# Patient Record
Sex: MALE | Race: WHITE | NOT HISPANIC OR LATINO | Employment: OTHER
[De-identification: names, ages, dates, MRNs, and addresses within clinical notes are randomized per-mention and may not be internally consistent; named-entity substitution may affect disease eponyms.]

---

## 2017-10-30 ENCOUNTER — RECORDS - HEALTHEAST (OUTPATIENT)
Dept: ADMINISTRATIVE | Facility: OTHER | Age: 77
End: 2017-10-30

## 2017-10-30 LAB — HBA1C MFR BLD: 6.7 % (ref 4–6)

## 2018-05-07 ENCOUNTER — RECORDS - HEALTHEAST (OUTPATIENT)
Dept: ADMINISTRATIVE | Facility: OTHER | Age: 78
End: 2018-05-07

## 2018-05-07 LAB — HBA1C MFR BLD: 6.7 % (ref 4–6)

## 2018-08-01 ENCOUNTER — RECORDS - HEALTHEAST (OUTPATIENT)
Dept: ADMINISTRATIVE | Facility: OTHER | Age: 78
End: 2018-08-01

## 2018-09-04 ENCOUNTER — RECORDS - HEALTHEAST (OUTPATIENT)
Dept: ADMINISTRATIVE | Facility: OTHER | Age: 78
End: 2018-09-04

## 2018-10-29 ENCOUNTER — RECORDS - HEALTHEAST (OUTPATIENT)
Dept: ADMINISTRATIVE | Facility: OTHER | Age: 78
End: 2018-10-29

## 2018-10-29 LAB — HBA1C MFR BLD: 6.9 % (ref 4–5.6)

## 2019-02-11 ENCOUNTER — NEW PATIENT (OUTPATIENT)
Dept: URBAN - METROPOLITAN AREA CLINIC 51 | Facility: CLINIC | Age: 79
End: 2019-02-11
Payer: MEDICARE

## 2019-02-11 DIAGNOSIS — Z79.84 LONG TERM (CURRENT) USE OF ORAL ANTIDIABETIC DRUGS: ICD-10-CM

## 2019-02-11 DIAGNOSIS — H35.071 RETINAL TELANGIECTASIS, RIGHT EYE: ICD-10-CM

## 2019-02-11 DIAGNOSIS — E11.3293 DIABETES MELLITUS TYPE 2 WITH MILD NON-PROLIFERATI: ICD-10-CM

## 2019-02-11 DIAGNOSIS — H34.8310 BRANCH RETINAL VEIN OCCLUSION W/ MACULAR EDEMA, RIGHT EYE: Primary | ICD-10-CM

## 2019-02-11 PROCEDURE — 92235 FLUORESCEIN ANGRPH MLTIFRAME: CPT | Performed by: OPHTHALMOLOGY

## 2019-02-11 PROCEDURE — 92134 CPTRZ OPH DX IMG PST SGM RTA: CPT | Performed by: OPHTHALMOLOGY

## 2019-02-11 PROCEDURE — 92004 COMPRE OPH EXAM NEW PT 1/>: CPT | Performed by: OPHTHALMOLOGY

## 2019-02-11 ASSESSMENT — INTRAOCULAR PRESSURE
OS: 14
OD: 17

## 2019-05-15 ENCOUNTER — RECORDS - HEALTHEAST (OUTPATIENT)
Dept: HEALTH INFORMATION MANAGEMENT | Facility: CLINIC | Age: 79
End: 2019-05-15

## 2019-07-29 ENCOUNTER — RECORDS - HEALTHEAST (OUTPATIENT)
Dept: ADMINISTRATIVE | Facility: OTHER | Age: 79
End: 2019-07-29

## 2019-07-31 ENCOUNTER — RECORDS - HEALTHEAST (OUTPATIENT)
Dept: HEALTH INFORMATION MANAGEMENT | Facility: CLINIC | Age: 79
End: 2019-07-31

## 2019-09-04 ENCOUNTER — COMMUNICATION - HEALTHEAST (OUTPATIENT)
Dept: TELEHEALTH | Facility: CLINIC | Age: 79
End: 2019-09-04

## 2019-09-04 ENCOUNTER — OFFICE VISIT - HEALTHEAST (OUTPATIENT)
Dept: INTERNAL MEDICINE | Facility: CLINIC | Age: 79
End: 2019-09-04

## 2019-09-04 DIAGNOSIS — Z51.81 ENCOUNTER FOR THERAPEUTIC DRUG MONITORING: ICD-10-CM

## 2019-09-04 DIAGNOSIS — Z87.39 HISTORY OF GOUT: ICD-10-CM

## 2019-09-04 DIAGNOSIS — N18.30 CHRONIC RENAL INSUFFICIENCY, STAGE 3 (MODERATE) (H): ICD-10-CM

## 2019-09-04 DIAGNOSIS — E11.9 TYPE 2 DIABETES MELLITUS WITHOUT COMPLICATION, WITHOUT LONG-TERM CURRENT USE OF INSULIN (H): ICD-10-CM

## 2019-09-04 DIAGNOSIS — Z86.73 HISTORY OF STROKE: ICD-10-CM

## 2019-09-04 DIAGNOSIS — I25.84 CORONARY ARTERY DISEASE DUE TO CALCIFIED CORONARY LESION: ICD-10-CM

## 2019-09-04 DIAGNOSIS — E78.00 HYPERCHOLESTEROLEMIA: ICD-10-CM

## 2019-09-04 DIAGNOSIS — I10 ESSENTIAL HYPERTENSION, BENIGN: ICD-10-CM

## 2019-09-04 DIAGNOSIS — I25.10 CORONARY ARTERY DISEASE DUE TO CALCIFIED CORONARY LESION: ICD-10-CM

## 2019-09-04 LAB
ALBUMIN SERPL-MCNC: 4.1 G/DL (ref 3.5–5)
ALP SERPL-CCNC: 72 U/L (ref 45–120)
ALT SERPL W P-5'-P-CCNC: 27 U/L (ref 0–45)
ANION GAP SERPL CALCULATED.3IONS-SCNC: 10 MMOL/L (ref 5–18)
AST SERPL W P-5'-P-CCNC: 22 U/L (ref 0–40)
BILIRUB SERPL-MCNC: 0.5 MG/DL (ref 0–1)
BUN SERPL-MCNC: 29 MG/DL (ref 8–28)
CALCIUM SERPL-MCNC: 9.6 MG/DL (ref 8.5–10.5)
CHLORIDE BLD-SCNC: 107 MMOL/L (ref 98–107)
CO2 SERPL-SCNC: 26 MMOL/L (ref 22–31)
CREAT SERPL-MCNC: 2 MG/DL (ref 0.7–1.3)
GFR SERPL CREATININE-BSD FRML MDRD: 32 ML/MIN/1.73M2
GLUCOSE BLD-MCNC: 155 MG/DL (ref 70–125)
HBA1C MFR BLD: 7.2 % (ref 3.5–6)
LDLC SERPL CALC-MCNC: 104 MG/DL
POTASSIUM BLD-SCNC: 4.9 MMOL/L (ref 3.5–5)
PROT SERPL-MCNC: 7 G/DL (ref 6–8)
SODIUM SERPL-SCNC: 143 MMOL/L (ref 136–145)
URATE SERPL-MCNC: 6.3 MG/DL (ref 3–8)

## 2019-09-04 RX ORDER — ASPIRIN 325 MG
325 TABLET ORAL DAILY
Status: SHIPPED | COMMUNITY
Start: 2014-01-26

## 2019-09-04 RX ORDER — GLUCOSAMINE HCL/CHONDROITIN SU 500-400 MG
CAPSULE ORAL
Status: SHIPPED | COMMUNITY
Start: 2019-07-17 | End: 2022-07-11

## 2019-09-05 ENCOUNTER — COMMUNICATION - HEALTHEAST (OUTPATIENT)
Dept: INTERNAL MEDICINE | Facility: CLINIC | Age: 79
End: 2019-09-05

## 2019-09-13 ENCOUNTER — OFFICE VISIT - HEALTHEAST (OUTPATIENT)
Dept: CARDIOLOGY | Facility: CLINIC | Age: 79
End: 2019-09-13

## 2019-09-13 DIAGNOSIS — I25.10 CORONARY ARTERY DISEASE DUE TO CALCIFIED CORONARY LESION: ICD-10-CM

## 2019-09-13 DIAGNOSIS — I10 ESSENTIAL HYPERTENSION: ICD-10-CM

## 2019-09-13 DIAGNOSIS — E78.2 MIXED HYPERLIPIDEMIA: ICD-10-CM

## 2019-09-13 DIAGNOSIS — I25.84 CORONARY ARTERY DISEASE DUE TO CALCIFIED CORONARY LESION: ICD-10-CM

## 2019-09-13 DIAGNOSIS — I20.9 ANGINA PECTORIS (H): ICD-10-CM

## 2019-09-13 ASSESSMENT — MIFFLIN-ST. JEOR: SCORE: 1857.05

## 2019-09-16 ENCOUNTER — HOSPITAL ENCOUNTER (OUTPATIENT)
Dept: NUCLEAR MEDICINE | Facility: CLINIC | Age: 79
Discharge: HOME | End: 2019-09-16
Attending: INTERNAL MEDICINE

## 2019-09-16 ENCOUNTER — HOSPITAL ENCOUNTER (OUTPATIENT)
Dept: CARDIOLOGY | Facility: CLINIC | Age: 79
Discharge: HOME | End: 2019-09-16
Attending: INTERNAL MEDICINE

## 2019-09-16 DIAGNOSIS — I20.9 ANGINA PECTORIS (H): ICD-10-CM

## 2019-09-16 LAB
CV STRESS CURRENT BP HE: NORMAL
CV STRESS CURRENT HR HE: 63
CV STRESS CURRENT HR HE: 67
CV STRESS CURRENT HR HE: 69
CV STRESS CURRENT HR HE: 71
CV STRESS CURRENT HR HE: 72
CV STRESS CURRENT HR HE: 73
CV STRESS CURRENT HR HE: 74
CV STRESS CURRENT HR HE: 75
CV STRESS DEVIATION TIME HE: NORMAL
CV STRESS ECHO PERCENT HR HE: NORMAL
CV STRESS EXERCISE STAGE HE: NORMAL
CV STRESS FINAL RESTING BP HE: NORMAL
CV STRESS FINAL RESTING HR HE: 74
CV STRESS MAX HR HE: 76
CV STRESS MAX TREADMILL GRADE HE: 0
CV STRESS MAX TREADMILL SPEED HE: 0
CV STRESS PEAK DIA BP HE: NORMAL
CV STRESS PEAK SYS BP HE: NORMAL
CV STRESS PHASE HE: NORMAL
CV STRESS PROTOCOL HE: NORMAL
CV STRESS RESTING PT POSITION HE: NORMAL
CV STRESS ST DEVIATION AMOUNT HE: NORMAL
CV STRESS ST DEVIATION ELEVATION HE: NORMAL
CV STRESS ST EVELATION AMOUNT HE: NORMAL
CV STRESS TEST TYPE HE: NORMAL
CV STRESS TOTAL STAGE TIME MIN 1 HE: NORMAL
NUC STRESS EJECTION FRACTION: 67 %
STRESS ECHO BASELINE BP: NORMAL
STRESS ECHO BASELINE HR: 70
STRESS ECHO CALCULATED PERCENT HR: 54 %
STRESS ECHO LAST STRESS BP: NORMAL
STRESS ECHO LAST STRESS HR: 71

## 2019-12-21 ENCOUNTER — COMMUNICATION - HEALTHEAST (OUTPATIENT)
Dept: SCHEDULING | Facility: CLINIC | Age: 79
End: 2019-12-21

## 2019-12-21 ENCOUNTER — COMMUNICATION - HEALTHEAST (OUTPATIENT)
Dept: INTERNAL MEDICINE | Facility: CLINIC | Age: 79
End: 2019-12-21

## 2019-12-21 DIAGNOSIS — I10 ESSENTIAL HYPERTENSION, BENIGN: ICD-10-CM

## 2019-12-22 RX ORDER — CARVEDILOL 3.12 MG/1
3.12 TABLET ORAL 2 TIMES DAILY WITH MEALS
Qty: 180 TABLET | Refills: 2 | Status: SHIPPED | OUTPATIENT
Start: 2019-12-22 | End: 2022-06-08

## 2020-01-06 ENCOUNTER — COMMUNICATION - HEALTHEAST (OUTPATIENT)
Dept: INTERNAL MEDICINE | Facility: CLINIC | Age: 80
End: 2020-01-06

## 2020-01-06 DIAGNOSIS — E78.00 HYPERCHOLESTEROLEMIA: ICD-10-CM

## 2020-02-01 ENCOUNTER — COMMUNICATION - HEALTHEAST (OUTPATIENT)
Dept: INTERNAL MEDICINE | Facility: CLINIC | Age: 80
End: 2020-02-01

## 2020-02-01 DIAGNOSIS — I10 ESSENTIAL HYPERTENSION, BENIGN: ICD-10-CM

## 2020-05-01 ENCOUNTER — COMMUNICATION - HEALTHEAST (OUTPATIENT)
Dept: INTERNAL MEDICINE | Facility: CLINIC | Age: 80
End: 2020-05-01

## 2020-05-06 ENCOUNTER — OFFICE VISIT - HEALTHEAST (OUTPATIENT)
Dept: INTERNAL MEDICINE | Facility: CLINIC | Age: 80
End: 2020-05-06

## 2020-05-06 DIAGNOSIS — H35.00 RETINOPATHY: ICD-10-CM

## 2020-05-06 DIAGNOSIS — E78.00 HYPERCHOLESTEROLEMIA: ICD-10-CM

## 2020-05-06 DIAGNOSIS — Z86.73 HISTORY OF STROKE: ICD-10-CM

## 2020-05-06 DIAGNOSIS — Z51.81 ENCOUNTER FOR THERAPEUTIC DRUG MONITORING: ICD-10-CM

## 2020-05-06 DIAGNOSIS — J30.0 VASOMOTOR RHINITIS: ICD-10-CM

## 2020-05-06 DIAGNOSIS — E11.9 TYPE 2 DIABETES MELLITUS WITHOUT COMPLICATION, WITHOUT LONG-TERM CURRENT USE OF INSULIN (H): ICD-10-CM

## 2020-05-06 DIAGNOSIS — Z87.39 HISTORY OF GOUT: ICD-10-CM

## 2020-05-06 DIAGNOSIS — N18.30 CHRONIC RENAL INSUFFICIENCY, STAGE 3 (MODERATE) (H): ICD-10-CM

## 2020-05-06 DIAGNOSIS — I25.84 CORONARY ARTERY DISEASE DUE TO CALCIFIED CORONARY LESION: ICD-10-CM

## 2020-05-06 DIAGNOSIS — I10 ESSENTIAL HYPERTENSION, BENIGN: ICD-10-CM

## 2020-05-06 DIAGNOSIS — I25.10 CORONARY ARTERY DISEASE DUE TO CALCIFIED CORONARY LESION: ICD-10-CM

## 2020-05-06 RX ORDER — AZELASTINE 1 MG/ML
SPRAY, METERED NASAL
Qty: 30 ML | Refills: 12 | Status: SHIPPED | OUTPATIENT
Start: 2020-05-06 | End: 2022-07-27

## 2020-05-22 ENCOUNTER — RECORDS - HEALTHEAST (OUTPATIENT)
Dept: ADMINISTRATIVE | Facility: OTHER | Age: 80
End: 2020-05-22

## 2020-05-22 LAB — RETINOPATHY: NEGATIVE

## 2020-05-27 ENCOUNTER — RECORDS - HEALTHEAST (OUTPATIENT)
Dept: HEALTH INFORMATION MANAGEMENT | Facility: CLINIC | Age: 80
End: 2020-05-27

## 2020-07-06 ENCOUNTER — AMBULATORY - HEALTHEAST (OUTPATIENT)
Dept: LAB | Facility: CLINIC | Age: 80
End: 2020-07-06

## 2020-07-06 DIAGNOSIS — I25.10 CORONARY ARTERY DISEASE DUE TO CALCIFIED CORONARY LESION: ICD-10-CM

## 2020-07-06 DIAGNOSIS — E11.9 TYPE 2 DIABETES MELLITUS WITHOUT COMPLICATION, WITHOUT LONG-TERM CURRENT USE OF INSULIN (H): ICD-10-CM

## 2020-07-06 DIAGNOSIS — I25.84 CORONARY ARTERY DISEASE DUE TO CALCIFIED CORONARY LESION: ICD-10-CM

## 2020-07-06 DIAGNOSIS — Z51.81 ENCOUNTER FOR THERAPEUTIC DRUG MONITORING: ICD-10-CM

## 2020-07-06 LAB
ALBUMIN SERPL-MCNC: 4.1 G/DL (ref 3.5–5)
ALP SERPL-CCNC: 69 U/L (ref 45–120)
ALT SERPL W P-5'-P-CCNC: 48 U/L (ref 0–45)
ANION GAP SERPL CALCULATED.3IONS-SCNC: 14 MMOL/L (ref 5–18)
AST SERPL W P-5'-P-CCNC: 35 U/L (ref 0–40)
BILIRUB SERPL-MCNC: 0.6 MG/DL (ref 0–1)
BUN SERPL-MCNC: 34 MG/DL (ref 8–28)
CALCIUM SERPL-MCNC: 9.6 MG/DL (ref 8.5–10.5)
CHLORIDE BLD-SCNC: 105 MMOL/L (ref 98–107)
CHOLEST SERPL-MCNC: 155 MG/DL
CO2 SERPL-SCNC: 21 MMOL/L (ref 22–31)
CREAT SERPL-MCNC: 2.06 MG/DL (ref 0.7–1.3)
FASTING STATUS PATIENT QL REPORTED: YES
GFR SERPL CREATININE-BSD FRML MDRD: 31 ML/MIN/1.73M2
GLUCOSE BLD-MCNC: 151 MG/DL (ref 70–125)
HBA1C MFR BLD: 7.5 % (ref 3.5–6)
HDLC SERPL-MCNC: 35 MG/DL
LDLC SERPL CALC-MCNC: 74 MG/DL
POTASSIUM BLD-SCNC: 4.4 MMOL/L (ref 3.5–5)
PROT SERPL-MCNC: 7 G/DL (ref 6–8)
SODIUM SERPL-SCNC: 140 MMOL/L (ref 136–145)
TRIGL SERPL-MCNC: 230 MG/DL

## 2020-07-07 ENCOUNTER — COMMUNICATION - HEALTHEAST (OUTPATIENT)
Dept: INTERNAL MEDICINE | Facility: CLINIC | Age: 80
End: 2020-07-07

## 2020-07-07 ENCOUNTER — OFFICE VISIT - HEALTHEAST (OUTPATIENT)
Dept: INTERNAL MEDICINE | Facility: CLINIC | Age: 80
End: 2020-07-07

## 2020-07-07 DIAGNOSIS — I25.10 CORONARY ARTERY DISEASE DUE TO CALCIFIED CORONARY LESION: ICD-10-CM

## 2020-07-07 DIAGNOSIS — E11.9 TYPE 2 DIABETES MELLITUS WITHOUT COMPLICATION, WITHOUT LONG-TERM CURRENT USE OF INSULIN (H): ICD-10-CM

## 2020-07-07 DIAGNOSIS — N18.30 CHRONIC RENAL INSUFFICIENCY, STAGE 3 (MODERATE) (H): ICD-10-CM

## 2020-07-07 DIAGNOSIS — Z86.73 HISTORY OF STROKE: ICD-10-CM

## 2020-07-07 DIAGNOSIS — Z87.39 HISTORY OF GOUT: ICD-10-CM

## 2020-07-07 DIAGNOSIS — E78.00 HYPERCHOLESTEROLEMIA: ICD-10-CM

## 2020-07-07 DIAGNOSIS — I10 ESSENTIAL HYPERTENSION, BENIGN: ICD-10-CM

## 2020-07-07 DIAGNOSIS — I25.84 CORONARY ARTERY DISEASE DUE TO CALCIFIED CORONARY LESION: ICD-10-CM

## 2020-07-14 ENCOUNTER — COMMUNICATION - HEALTHEAST (OUTPATIENT)
Dept: INTERNAL MEDICINE | Facility: CLINIC | Age: 80
End: 2020-07-14

## 2020-07-22 ENCOUNTER — COMMUNICATION - HEALTHEAST (OUTPATIENT)
Dept: INTERNAL MEDICINE | Facility: CLINIC | Age: 80
End: 2020-07-22

## 2020-07-22 DIAGNOSIS — Z87.39 HISTORY OF GOUT: ICD-10-CM

## 2020-07-27 ENCOUNTER — COMMUNICATION - HEALTHEAST (OUTPATIENT)
Dept: PHARMACY | Facility: CLINIC | Age: 80
End: 2020-07-27

## 2020-07-27 ENCOUNTER — AMBULATORY - HEALTHEAST (OUTPATIENT)
Dept: PHARMACY | Facility: CLINIC | Age: 80
End: 2020-07-27

## 2020-07-27 DIAGNOSIS — N18.30 CKD (CHRONIC KIDNEY DISEASE) STAGE 3, GFR 30-59 ML/MIN (H): ICD-10-CM

## 2020-07-27 DIAGNOSIS — I25.10 CORONARY ARTERY DISEASE DUE TO CALCIFIED CORONARY LESION: ICD-10-CM

## 2020-07-27 DIAGNOSIS — M1A.0790 CHRONIC GOUT OF FOOT, UNSPECIFIED CAUSE, UNSPECIFIED LATERALITY: ICD-10-CM

## 2020-07-27 DIAGNOSIS — M10.9 GOUT: ICD-10-CM

## 2020-07-27 DIAGNOSIS — E78.5 HYPERLIPIDEMIA, UNSPECIFIED HYPERLIPIDEMIA TYPE: ICD-10-CM

## 2020-07-27 DIAGNOSIS — E11.9 TYPE 2 DIABETES MELLITUS WITHOUT COMPLICATION, WITHOUT LONG-TERM CURRENT USE OF INSULIN (H): ICD-10-CM

## 2020-07-27 DIAGNOSIS — I25.84 CORONARY ARTERY DISEASE DUE TO CALCIFIED CORONARY LESION: ICD-10-CM

## 2020-07-27 DIAGNOSIS — I10 ESSENTIAL HYPERTENSION: ICD-10-CM

## 2020-07-27 PROCEDURE — 99605 MTMS BY PHARM NP 15 MIN: CPT | Performed by: PHARMACIST

## 2020-07-27 PROCEDURE — 99607 MTMS BY PHARM ADDL 15 MIN: CPT | Performed by: PHARMACIST

## 2020-07-27 RX ORDER — FEBUXOSTAT 40 MG/1
40 TABLET, FILM COATED ORAL DAILY
Qty: 90 TABLET | Refills: 3 | Status: SHIPPED | OUTPATIENT
Start: 2020-07-27 | End: 2021-08-16

## 2020-10-08 ENCOUNTER — OFFICE VISIT - HEALTHEAST (OUTPATIENT)
Dept: INTERNAL MEDICINE | Facility: CLINIC | Age: 80
End: 2020-10-08

## 2020-10-08 DIAGNOSIS — I25.10 CORONARY ARTERY DISEASE DUE TO LIPID RICH PLAQUE: ICD-10-CM

## 2020-10-08 DIAGNOSIS — N18.30 CHRONIC RENAL INSUFFICIENCY, STAGE 3 (MODERATE) (H): ICD-10-CM

## 2020-10-08 DIAGNOSIS — E78.00 HYPERCHOLESTEROLEMIA: ICD-10-CM

## 2020-10-08 DIAGNOSIS — E11.21 TYPE 2 DIABETES MELLITUS WITH DIABETIC NEPHROPATHY, WITHOUT LONG-TERM CURRENT USE OF INSULIN (H): ICD-10-CM

## 2020-10-08 DIAGNOSIS — Z51.81 ENCOUNTER FOR THERAPEUTIC DRUG MONITORING: ICD-10-CM

## 2020-10-08 DIAGNOSIS — Z23 NEED FOR IMMUNIZATION AGAINST INFLUENZA: ICD-10-CM

## 2020-10-08 DIAGNOSIS — I10 ESSENTIAL HYPERTENSION, BENIGN: ICD-10-CM

## 2020-10-08 DIAGNOSIS — I25.83 CORONARY ARTERY DISEASE DUE TO LIPID RICH PLAQUE: ICD-10-CM

## 2020-10-08 LAB
ALBUMIN SERPL-MCNC: 4.2 G/DL (ref 3.5–5)
ALP SERPL-CCNC: 70 U/L (ref 45–120)
ALT SERPL W P-5'-P-CCNC: 61 U/L (ref 0–45)
ANION GAP SERPL CALCULATED.3IONS-SCNC: 11 MMOL/L (ref 5–18)
AST SERPL W P-5'-P-CCNC: 56 U/L (ref 0–40)
BILIRUB SERPL-MCNC: 0.6 MG/DL (ref 0–1)
BUN SERPL-MCNC: 36 MG/DL (ref 8–28)
CALCIUM SERPL-MCNC: 9.5 MG/DL (ref 8.5–10.5)
CHLORIDE BLD-SCNC: 104 MMOL/L (ref 98–107)
CO2 SERPL-SCNC: 26 MMOL/L (ref 22–31)
CREAT SERPL-MCNC: 2.05 MG/DL (ref 0.7–1.3)
GFR SERPL CREATININE-BSD FRML MDRD: 31 ML/MIN/1.73M2
GLUCOSE BLD-MCNC: 242 MG/DL (ref 70–125)
HBA1C MFR BLD: 8.4 %
POTASSIUM BLD-SCNC: 4.6 MMOL/L (ref 3.5–5)
PROT SERPL-MCNC: 6.9 G/DL (ref 6–8)
SODIUM SERPL-SCNC: 141 MMOL/L (ref 136–145)

## 2020-10-09 ENCOUNTER — COMMUNICATION - HEALTHEAST (OUTPATIENT)
Dept: INTERNAL MEDICINE | Facility: CLINIC | Age: 80
End: 2020-10-09

## 2020-10-09 DIAGNOSIS — E11.21 TYPE 2 DIABETES MELLITUS WITH DIABETIC NEPHROPATHY, WITHOUT LONG-TERM CURRENT USE OF INSULIN (H): ICD-10-CM

## 2020-10-10 ENCOUNTER — COMMUNICATION - HEALTHEAST (OUTPATIENT)
Dept: INTERNAL MEDICINE | Facility: CLINIC | Age: 80
End: 2020-10-10

## 2020-10-10 DIAGNOSIS — I25.10 CORONARY ARTERY DISEASE DUE TO CALCIFIED CORONARY LESION: ICD-10-CM

## 2020-10-10 DIAGNOSIS — I25.84 CORONARY ARTERY DISEASE DUE TO CALCIFIED CORONARY LESION: ICD-10-CM

## 2020-10-12 RX ORDER — NITROGLYCERIN 0.4 MG/1
TABLET SUBLINGUAL
Qty: 25 TABLET | Refills: 2 | Status: SHIPPED | OUTPATIENT
Start: 2020-10-12 | End: 2022-03-28

## 2020-10-27 ENCOUNTER — OFFICE VISIT - HEALTHEAST (OUTPATIENT)
Dept: INTERNAL MEDICINE | Facility: CLINIC | Age: 80
End: 2020-10-27

## 2020-10-27 DIAGNOSIS — E66.811 CLASS 1 OBESITY DUE TO EXCESS CALORIES WITH SERIOUS COMORBIDITY AND BODY MASS INDEX (BMI) OF 34.0 TO 34.9 IN ADULT: ICD-10-CM

## 2020-10-27 DIAGNOSIS — I10 ESSENTIAL HYPERTENSION: ICD-10-CM

## 2020-10-27 DIAGNOSIS — I25.119 ATHEROSCLEROSIS OF NATIVE CORONARY ARTERY OF NATIVE HEART WITH ANGINA PECTORIS (H): ICD-10-CM

## 2020-10-27 DIAGNOSIS — E66.09 CLASS 1 OBESITY DUE TO EXCESS CALORIES WITH SERIOUS COMORBIDITY AND BODY MASS INDEX (BMI) OF 34.0 TO 34.9 IN ADULT: ICD-10-CM

## 2020-10-27 DIAGNOSIS — N18.32 STAGE 3B CHRONIC KIDNEY DISEASE (H): ICD-10-CM

## 2020-10-27 DIAGNOSIS — M1A.9XX0 CHRONIC GOUT WITHOUT TOPHUS, UNSPECIFIED CAUSE, UNSPECIFIED SITE: ICD-10-CM

## 2020-10-27 DIAGNOSIS — E78.49 OTHER HYPERLIPIDEMIA: ICD-10-CM

## 2020-10-27 DIAGNOSIS — E11.59 TYPE 2 DIABETES MELLITUS WITH VASCULAR DISEASE (H): ICD-10-CM

## 2020-11-20 ENCOUNTER — COMMUNICATION - HEALTHEAST (OUTPATIENT)
Dept: INTERNAL MEDICINE | Facility: CLINIC | Age: 80
End: 2020-11-20

## 2021-01-15 ENCOUNTER — COMMUNICATION - HEALTHEAST (OUTPATIENT)
Dept: INTERNAL MEDICINE | Facility: CLINIC | Age: 81
End: 2021-01-15

## 2021-01-19 ENCOUNTER — COMMUNICATION - HEALTHEAST (OUTPATIENT)
Dept: INTERNAL MEDICINE | Facility: CLINIC | Age: 81
End: 2021-01-19

## 2021-01-19 DIAGNOSIS — E78.00 HYPERCHOLESTEROLEMIA: ICD-10-CM

## 2021-01-20 ENCOUNTER — COMMUNICATION - HEALTHEAST (OUTPATIENT)
Dept: INTERNAL MEDICINE | Facility: CLINIC | Age: 81
End: 2021-01-20

## 2021-01-20 DIAGNOSIS — E78.00 HYPERCHOLESTEROLEMIA: ICD-10-CM

## 2021-04-18 ENCOUNTER — COMMUNICATION - HEALTHEAST (OUTPATIENT)
Dept: INTERNAL MEDICINE | Facility: CLINIC | Age: 81
End: 2021-04-18

## 2021-04-18 DIAGNOSIS — E78.00 HYPERCHOLESTEROLEMIA: ICD-10-CM

## 2021-04-19 RX ORDER — ATORVASTATIN CALCIUM 40 MG/1
TABLET, FILM COATED ORAL
Qty: 90 TABLET | Refills: 2 | Status: SHIPPED | OUTPATIENT
Start: 2021-04-19 | End: 2022-02-07

## 2021-05-07 ENCOUNTER — RECORDS - HEALTHEAST (OUTPATIENT)
Dept: TELEHEALTH | Facility: CLINIC | Age: 81
End: 2021-05-07

## 2021-05-13 ENCOUNTER — COMMUNICATION - HEALTHEAST (OUTPATIENT)
Dept: INTERNAL MEDICINE | Facility: CLINIC | Age: 81
End: 2021-05-13

## 2021-05-13 ENCOUNTER — OFFICE VISIT - HEALTHEAST (OUTPATIENT)
Dept: INTERNAL MEDICINE | Facility: CLINIC | Age: 81
End: 2021-05-13

## 2021-05-13 DIAGNOSIS — I25.84 CORONARY ARTERY DISEASE DUE TO CALCIFIED CORONARY LESION: ICD-10-CM

## 2021-05-13 DIAGNOSIS — I25.10 CORONARY ARTERY DISEASE DUE TO CALCIFIED CORONARY LESION: ICD-10-CM

## 2021-05-13 DIAGNOSIS — Z86.73 HISTORY OF STROKE: ICD-10-CM

## 2021-05-13 DIAGNOSIS — E78.00 HYPERCHOLESTEROLEMIA: ICD-10-CM

## 2021-05-13 DIAGNOSIS — I10 ESSENTIAL HYPERTENSION, BENIGN: ICD-10-CM

## 2021-05-13 DIAGNOSIS — E11.9 TYPE 2 DIABETES MELLITUS WITHOUT COMPLICATION, WITHOUT LONG-TERM CURRENT USE OF INSULIN (H): ICD-10-CM

## 2021-05-13 DIAGNOSIS — N18.30 CHRONIC RENAL INSUFFICIENCY, STAGE 3 (MODERATE) (H): ICD-10-CM

## 2021-05-13 DIAGNOSIS — Z51.81 ENCOUNTER FOR THERAPEUTIC DRUG MONITORING: ICD-10-CM

## 2021-05-13 LAB
ALBUMIN SERPL-MCNC: 4 G/DL (ref 3.5–5)
ALP SERPL-CCNC: 68 U/L (ref 45–120)
ALT SERPL W P-5'-P-CCNC: 23 U/L (ref 0–45)
ANION GAP SERPL CALCULATED.3IONS-SCNC: 11 MMOL/L (ref 5–18)
AST SERPL W P-5'-P-CCNC: 18 U/L (ref 0–40)
BILIRUB SERPL-MCNC: 0.5 MG/DL (ref 0–1)
BUN SERPL-MCNC: 28 MG/DL (ref 8–28)
CALCIUM SERPL-MCNC: 9.4 MG/DL (ref 8.5–10.5)
CHLORIDE BLD-SCNC: 106 MMOL/L (ref 98–107)
CHOLEST SERPL-MCNC: 150 MG/DL
CK SERPL-CCNC: 68 U/L (ref 30–190)
CO2 SERPL-SCNC: 25 MMOL/L (ref 22–31)
CREAT SERPL-MCNC: 2.11 MG/DL (ref 0.7–1.3)
FASTING STATUS PATIENT QL REPORTED: YES
GFR SERPL CREATININE-BSD FRML MDRD: 30 ML/MIN/1.73M2
GLUCOSE BLD-MCNC: 158 MG/DL (ref 70–125)
HBA1C MFR BLD: 6.5 %
HDLC SERPL-MCNC: 38 MG/DL
LDLC SERPL CALC-MCNC: 75 MG/DL
POTASSIUM BLD-SCNC: 4.6 MMOL/L (ref 3.5–5)
PROT SERPL-MCNC: 6.9 G/DL (ref 6–8)
SODIUM SERPL-SCNC: 142 MMOL/L (ref 136–145)
TRIGL SERPL-MCNC: 185 MG/DL

## 2021-05-13 RX ORDER — GLIMEPIRIDE 2 MG/1
TABLET ORAL
Qty: 180 TABLET | Refills: 3 | Status: SHIPPED | OUTPATIENT
Start: 2021-05-13 | End: 2022-05-17

## 2021-05-17 ENCOUNTER — COMMUNICATION - HEALTHEAST (OUTPATIENT)
Dept: ADMINISTRATIVE | Facility: CLINIC | Age: 81
End: 2021-05-17

## 2021-05-21 ENCOUNTER — COMMUNICATION - HEALTHEAST (OUTPATIENT)
Dept: INTERNAL MEDICINE | Facility: CLINIC | Age: 81
End: 2021-05-21

## 2021-05-21 DIAGNOSIS — I10 ESSENTIAL HYPERTENSION, BENIGN: ICD-10-CM

## 2021-05-21 RX ORDER — LOSARTAN POTASSIUM 50 MG/1
50 TABLET ORAL 2 TIMES DAILY
Qty: 180 TABLET | Refills: 3 | Status: SHIPPED | OUTPATIENT
Start: 2021-05-21 | End: 2022-03-29

## 2021-05-25 ENCOUNTER — RECORDS - HEALTHEAST (OUTPATIENT)
Dept: ADMINISTRATIVE | Facility: CLINIC | Age: 81
End: 2021-05-25

## 2021-05-27 ENCOUNTER — RECORDS - HEALTHEAST (OUTPATIENT)
Dept: ADMINISTRATIVE | Facility: CLINIC | Age: 81
End: 2021-05-27

## 2021-05-27 ENCOUNTER — COMMUNICATION - HEALTHEAST (OUTPATIENT)
Dept: INTERNAL MEDICINE | Facility: CLINIC | Age: 81
End: 2021-05-27

## 2021-05-27 VITALS — DIASTOLIC BLOOD PRESSURE: 52 MMHG | WEIGHT: 238 LBS | HEART RATE: 68 BPM | SYSTOLIC BLOOD PRESSURE: 120 MMHG

## 2021-05-27 DIAGNOSIS — E11.59 TYPE 2 DIABETES MELLITUS WITH VASCULAR DISEASE (H): ICD-10-CM

## 2021-05-27 RX ORDER — SEMAGLUTIDE 1.34 MG/ML
0.5 INJECTION, SOLUTION SUBCUTANEOUS WEEKLY
Qty: 3 SYRINGE | Refills: 3 | Status: SHIPPED | OUTPATIENT
Start: 2021-05-27 | End: 2022-06-06

## 2021-06-01 NOTE — PROGRESS NOTES
[facility] Follow Up Note    Yohan Hayward   78 y.o. male    Date of Visit: 9/4/2019    Chief Complaint   Patient presents with     Establish Care     Subjective  Yohan has moved from [place] here to [facility] as a new patient.    Lives independently with wife at home.  Retired .    He does report about 2 alcohol drinks most evenings.    Quit smoking in 1981.    Patient reports a long history of coronary artery disease.  He reports previous angiogram for chest pain, but unable to place a stent.  He states he is never had a heart attack.    I did review the coronary angiogram from May 30, 2013.  Moderate diffuse coronary artery disease.  Circumflex is small, but noted to be unchanged in 20 years.  Noted to be the likely cause of the abnormal stress test.    Moderate LAD disease and moderate RCA disease without focal stenosis.  Left ventricular systolic function was noted to be mildly reduced.    Patient does report a stable angina pattern when walking up an incline or wall walking quicker than usual.  Uses a nitroglycerin which does help alleviate the symptoms.  If he takes a nitroglycerin before walk he will not get symptoms.  He reports these symptoms is stable for many years.  He has not seen a cardiologist recently.    No epigastric pain or history of GI bleeding on full dose aspirin.  No generalized myalgias or history of liver problems on Lipitor 40 mg.    He has not had a stent.    I reviewed cardiac echo from February 2014.  Grade 1 diastolic dysfunction.  Sclerosis of aortic valve but no stenosis.  No right to left shunting on bubble study.  Ejection fraction 60-65%.  Valves are otherwise normal.    No palpitations or history of arrhythmia.    Hypertension, has been severe in the past.  He reports a hypertensive encephalopathy event with speech problems in 2016.  That was with high spiking blood pressures.    Recently his blood pressure has been well controlled.   He does report some mild orthostasis when getting up from a chair or up out of a car after sitting for some time.  This is stable and relatively mild.    He does have chronic renal insufficiency with a creatinine of 2.0.    I did review labs from October 2018.  His potassium and sodium were normal at that time.    Hemoglobin A1c was 6.9% October 2018.    Diabetes has been controlled with Amaryl 4 mg every morning.  He denies any low blood sugars.  He has a glucometer but is not been working recently.    He does have mild foot neuropathy.  No sores.    He occasionally checks his blood pressure, your members about 2 weeks ago was 130/70.    Previous records note his renal insufficiency was felt secondary to diabetes and hypertension.    I did review an ultrasound in 2016 that was negative for renal artery stenosis.    In 2014 he had a small stroke with a left parietal and occipital punctate stroke noted on MRI.  I did review the MRI from 2014.  MRA did not show any severe stenoses.  There was no carotid artery stenosis.    He has a retina issue with neovascularization.  He had previous laser treatment in 2016 and 2018.  He does not have diabetic retinopathy.  I did review the ophthalmology note from July.  Patient is being referred to a retina specialist.    Patient denies significant daytime sleepiness.  He states he has not had evaluation in the past for sleep apnea but that is not been suspected.    Past history of gout but no recent.  He is takes Uloric every other day.    He is been taking HCTZ 12.5 mg every other day, without recurrent gout.  When he tried to take it every day he started getting some mild gout symptoms in the past.    Losartan 50 mill grams twice daily.  Carvedilol 3.125 mill grams twice daily and nifedipine CC 30 mg twice daily.    His mother had colon cancer.    I did review colonoscopy from September 2015 it was negative.  No further colonoscopies were recommended at that time with age and  comorbidities.    No urinary complaints.    He saw dermatology yesterday.  No past history of skin cancer.  They did treat a lesion on his left temple, however.  He goes in yearly for checks.    Patient goes to the [facility] and is active on a daily basis.    No headache complaints.  No swallowing difficulty.  No dental issues or mouth sores.    PMHx:  No past medical history on file.  PSHx:  No past surgical history on file.  Immunizations:   Immunization History   Administered Date(s) Administered     Influenza Whole 10/16/2013     Influenza high dose,seasonal,PF, 65+ yrs 09/14/2015, 10/27/2016, 09/04/2018     Influenza, Seasonal, Inj PF IIV3 10/10/2009     Influenza,seasonal, Inj IIV3 11/10/2011     Influenza,trivalent,im, 65+yrs 10/13/2017     Pneumo Conj 13-V (2010&after) 07/23/2015     Pneumo Polysac 23-V 10/31/2012     Tdap 10/31/2012       ROS A comprehensive review of systems was performed and was otherwise negative    Medications, allergies, and problem list were reviewed and updated    Exam  /70 (Patient Site: Right Arm, Patient Position: Sitting, Cuff Size: Adult Large)   Pulse 68   Wt (!) 248 lb (112.5 kg)   Alert and oriented x3.  Good mood and affect.  Moderately overweight.  Pupils and irises equal and reactive.  Extraocular muscles intact.  No jaundice or conjunctivitis.  Pharynx does not appear crowded.  No leukoplakia.  Teeth in adequate condition.  No thrush.  No cervical or supraclavicular adenopathy.  No JVD and no carotid bruits.  No thyromegaly or nodularity.  Lungs are clear to auscultation with good respiratory excursion.  Heart is regular with no murmur rub or gallop.  He has +2 posterior tibialis pulses bilaterally.  Trace ankle edema bilaterally.  Mild subjective numbness on feet.  Very small pre-ulcerative calluses, no ulcers.  Good capillary perfusion.  Abdomen is moderately overweight, nontender no hepatospleno megaly.  No pulsatile mass.  Skin exam shows a lesion that was removed  on his left temple yesterday at the dermatology office.  Nonfocal neurologic exam.  Gait within normal limits.  Speech normal.    Assessment/Plan  1. Coronary artery disease due to calcified coronary lesion, with chest pain  He describes a stable angina pattern, class I.    Previous angiogram showed stable moderate coronary artery disease without need for intervention.    Refer back to cardiology to establish care with cardiology.  He can discuss with cardiology whether he wishes to undergo additional stress testing and consideration for repeat angiogram.    Continue current medical management.    I did discuss the use of nitroglycerin, if he continues to have chest pain for more than 15 minutes, he was instructed to call 911.    Aspirin daily.  Lipitor 40 mg.      - nitroglycerin (NITROSTAT) 0.4 MG SL tablet; Place 1 tablet (0.4 mg total) under the tongue every 5 (five) minutes as needed for chest pain.  Dispense: 25 tablet; Refill: 3  - ULORIC 40 mg tablet; Take 1 tablet (40 mg total) by mouth daily.  Dispense: 90 tablet; Refill: 2  - Ambulatory referral to Cardiology    2. Type 2 diabetes mellitus without complication, without long-term current use of insulin (H)  Appears to be well controlled.  He was warned about low blood sugar risk with the Amaryl.    No metformin secondary to renal insufficiency.    He just recently saw the ophthalmologist and no retinopathy.    I discussed importance of good foot care and monitoring feet for sores.  With neuropathy.  - glimepiride (AMARYL) 2 MG tablet; Take 2 tablets (4 mg) by mouth every morning with meal.  Dispense: 180 tablet; Refill: 2  - Glycosylated Hemoglobin A1c    3. Hypercholesterolemia  Goal LDL less than 80.  Could consider increase Lipitor if needed.  - atorvastatin (LIPITOR) 40 MG tablet; Take 1 tablet (40 mg total) by mouth at bedtime.  Dispense: 90 tablet; Refill: 2  - LDL Cholesterol, Direct    4. Essential hypertension, benign  Appears adequately  controlled.  He has significant renal insufficiency.  Patient was told of risk of acute tubular necrosis and high potassium with his continued use of losartan.    Checking kidney labs today.    He no longer sees nephrology.    Avoid NSAIDs except for aspirin.      - NIFEdipine (PROCARDIA XL) 30 MG 24 hr tablet; TAKE 1 TABLET BY MOUTH TWICE DAILY. SWALLOW WHOLE. DO NOT CRUSH OR CHEW.  Dispense: 180 tablet; Refill: 2  - hydroCHLOROthiazide (MICROZIDE) 12.5 mg capsule; Take 1 capsule (12.5 mg) by mouth every 48 hours.  Dispense: 45 capsule; Refill: 2  - losartan (COZAAR) 50 MG tablet; Take 1 tablet (50 mg total) by mouth 2 (two) times a day.  Dispense: 90 tablet; Refill: 2    5. Chronic renal insufficiency, stage 3 (moderate) (H)  Has been stable.  Checking labs today.    6. Encounter for therapeutic drug monitoring    - Comprehensive Metabolic Panel    7. History of gout  No recurrence on Uloric.    He will maintain on low-dose every other day hydrochlorothiazide.    If blood pressure becomes difficult to control, could consider change to Lasix twice daily.  - Uric Acid    8. History of stroke  Small punctate strokes in 2014.  History of hypertensive encephalopathy.  Monitor blood pressure closely.    No further colonoscopies secondary to previous negative, age and comorbidities.    Get a flu shot in the fall.    Patient goes to [place] in the winter, will be leaving in November.  He will follow-up in the spring when he returns to [place].    Return in about 7 months (around 4/4/2020) for Annual physical.   Patient Instructions   Continue on current medications.    If you do have any low blood sugars less than 80, report low blood sugars immediately and you may need to take less of the glimepiride diabetes medication.    Discuss with your pharmacist about getting a test solution for your glucometer.    A referral has been placed for you to establish care with cardiology.    Results of today's lab work will be mailed  to you.    Get a flu shot this fall.    See me for a physical exam next spring when you return from [place].    Dimtiri Ma MD        Current Outpatient Medications   Medication Sig Dispense Refill     aspirin 325 MG tablet Take 1 tablet by mouth daily.       atorvastatin (LIPITOR) 40 MG tablet Take 1 tablet (40 mg total) by mouth at bedtime. 90 tablet 2     blood glucose control, normal Soln by In Vitro route. Ly : for use as needed for calibration of meter, Code: E11.59       blood glucose test (GLUCOSE BLOOD) strips USE TO TEST BLOOD SUGAR 1-2 TIMES DAILY       BLOOD-GLUCOSE METER MISC 1 Each by Does not apply route two times a day. Contour next meter Use to test blood sugar once or twice daily as directed.       carvedilol (COREG) 3.125 MG tablet Take 1 tablet by mouth 2 (two) times a day with meals.  1     glimepiride (AMARYL) 2 MG tablet Take 2 tablets (4 mg) by mouth every morning with meal. 180 tablet 2     hydrALAZINE (APRESOLINE) 25 MG tablet Take 1 Tab by mouth every six hours as needed (for blood pressure over 200 systolic.).       hydroCHLOROthiazide (MICROZIDE) 12.5 mg capsule Take 1 capsule (12.5 mg) by mouth every 48 hours. 45 capsule 2     losartan (COZAAR) 50 MG tablet Take 1 tablet (50 mg total) by mouth 2 (two) times a day. 90 tablet 2     NIFEdipine (PROCARDIA XL) 30 MG 24 hr tablet TAKE 1 TABLET BY MOUTH TWICE DAILY. SWALLOW WHOLE. DO NOT CRUSH OR CHEW. 180 tablet 2     nitroglycerin (NITROSTAT) 0.4 MG SL tablet Place 1 tablet (0.4 mg total) under the tongue every 5 (five) minutes as needed for chest pain. 25 tablet 3     ULORIC 40 mg tablet Take 1 tablet (40 mg total) by mouth daily. 90 tablet 2     No current facility-administered medications for this visit.      Allergies   Allergen Reactions     Allopurinol Other (See Comments)     Other reaction(s): Chest Tightness  Gave pt pains in chest like a heart attack  Pain       Social History     Tobacco Use     Smoking status: Former Smoker      Packs/day: 1.50     Years: 15.00     Pack years: 22.50     Types: Cigarettes     Last attempt to quit:      Years since quittin.6     Smokeless tobacco: Never Used   Substance Use Topics     Alcohol use: Yes     Alcohol/week: 8.4 oz     Types: 14 Shots of liquor per week     Drug use: Not on file       Time with patient greater than 60 minutes.  Greater than 50% the time coordination of care.  All time face-to-face.

## 2021-06-01 NOTE — PROGRESS NOTES
[facility] Consultation Note    Thank you, Dr. Ma, Dimitri DHALIWAL MD, for asking the [facility] team to see Yohan Hayward in consultation today at our clinic to evaluate exertional angina with known underlying coronary artery disease.      Assessment:   1.  Chronic exertional angina stable pattern  2.  Documented coronary artery disease by previous coronary angiography  3.  Essential hypertension  4.  Hyperlipidemia     Plan:   We will obtain a Lexiscan Cardiolite study in the near future to evaluate for underlying ischemic burden.  If a moderate to large area of ischemia is present would proceed with repeat coronary angiography.  I have made no changes in the patient's current medical regiment            Current History:   78-year-old male who was found to have an abnormal nuclear stress test 6 years ago with subsequent coronary angiography performed at [facility].  Patient's angiogram in May 2013 revealed a moderate to severe ostial circumflex stenosis and a relatively small vessel that was not intervened on.  He had moderate diffuse coronary stenosis in both his left anterior descending artery and right coronary artery.  For over the last 2 years or greater he has had chronic exertional anginal symptoms.  Patient does walk on a daily basis anywhere from a half a mile to 2 miles.  If he does is walk after eating a meal he will get angina and is generally relieved with rest.  He also gets angina if he walks up a relatively steep hill.  He will occasionally use sublingual nitroglycerin using around 10 nitroglycerin per month.  He will get his typical angina roughly twice a week with this pattern unchanged over the last 2 years.  Patient describes his angina as substernal chest discomfort without radiation or associated symptoms    Past Medical History:   No past medical history on file.  Essential hypertension, hyperlipidemia, type 2 diabetes mellitus and previous cerebrovascular  "accident.  Patient has no significant carotid stenosis by previous head and neck MR angiogram  Past Surgical History:     Past Surgical History:   Procedure Laterality Date     ESOPHAGOGASTRODUODENOSCOPY  2011   Left orthoscopic knee surgery    Family History:     Family History   Problem Relation Age of Onset     Colon cancer Mother         His mother passed away at age 64 due to colon cancer.     Acute Myocardial Infarction Father 42     Hypertension Father      Heart failure Father         His father passed away at age 74 due to congestive heart failure.     Hypertension Sister      Hypertension Brother      Hypertension Brother    Father  of a heart attack at age 76.  Mother  of a cerebral hemorrhage at age 64 with underlying colon cancer.  Patient has 2 brothers one sister none of whom have known coronary artery disease    Social History:    reports that he quit smoking about 38 years ago. His smoking use included cigarettes. He has a 22.50 pack-year smoking history. He has never used smokeless tobacco. He reports that he drinks about 8.4 oz of alcohol per week.  Patient is  and retired and recently moved from the [facility] [place]    Meds:   Scheduled Meds:  PRN Meds:.    Allergies:   Allopurinol    Review of Systems:   General: WNL  Eyes: Visual Distubance  Ears/Nose/Throat: WNL  Lungs: WNL  Heart: Chest Pain  Stomach: WNL  Bladder: WNL  Muscle/Joints: WNL  Skin: WNL  Nervous System: Dizziness  Mental Health: WNL     Blood: WNL      Objective:      Physical Exam  (!) 248 lb (112.5 kg)  5' 11\" (1.803 m)  Body mass index is 34.59 kg/m .  /60 (Patient Site: Right Arm, Patient Position: Sitting, Cuff Size: Adult Large)   Pulse 76   Resp 16   Ht 5' 11\" (1.803 m)   Wt (!) 248 lb (112.5 kg)   BMI 34.59 kg/m      General Appearance:   alert, no apparent distress   HEENT:  no scleral icterus; the mucous membranes were pink and moist                                  Neck: jugular venous " pressure is normal, no thyromegaly   Chest: the spine was straight and the chest was symmetric   Lungs:   respirations unlabored; the lungs are clear to auscultation   Cardiovascular:   regular rhythm with normal first and second heart sounds and no murmurs, clicks, or gallops. Carotid, radial, femoral, and posterior tibial pulses are intact; there are no carotid or femoral bruits.   Abdomen:  no organomegaly, masses, bruits, or tenderness; bowel sounds are present   Extremities: no cyanosis, clubbing, or edema   Skin: no xanthelasma   Neurologic: mood and affect are appropriate         Enrique angiography report from [facility] May 30, 2013 was reviewed as above          Lab Review   Lab Results   Component Value Date     09/04/2019    K 4.9 09/04/2019     09/04/2019    CO2 26 09/04/2019    BUN 29 (H) 09/04/2019    CREATININE 2.00 (H) 09/04/2019    CALCIUM 9.6 09/04/2019     No results found for: WBC, HGB, HCT, MCV, PLT  Lab Results   Component Value Date    LDLDIRECT 104 09/04/2019         Duane Purdy M.D., F.A.C.CSentara [facility]  [phone]

## 2021-06-01 NOTE — PATIENT INSTRUCTIONS - HE
Continue on current medications.    If you do have any low blood sugars less than 80, report low blood sugars immediately and you may need to take less of the glimepiride diabetes medication.    Discuss with your pharmacist about getting a test solution for your glucometer.    A referral has been placed for you to establish care with cardiology.    Results of today's lab work will be mailed to you.    Get a flu shot this fall.    See me for a physical exam next spring when you return from [place].

## 2021-06-03 VITALS
HEART RATE: 76 BPM | RESPIRATION RATE: 16 BRPM | DIASTOLIC BLOOD PRESSURE: 60 MMHG | WEIGHT: 248 LBS | SYSTOLIC BLOOD PRESSURE: 140 MMHG | BODY MASS INDEX: 34.72 KG/M2 | HEIGHT: 71 IN

## 2021-06-03 VITALS — WEIGHT: 248 LBS | SYSTOLIC BLOOD PRESSURE: 132 MMHG | DIASTOLIC BLOOD PRESSURE: 70 MMHG | HEART RATE: 68 BPM

## 2021-06-04 VITALS
DIASTOLIC BLOOD PRESSURE: 54 MMHG | HEART RATE: 88 BPM | BODY MASS INDEX: 33.89 KG/M2 | SYSTOLIC BLOOD PRESSURE: 128 MMHG | WEIGHT: 243 LBS

## 2021-06-04 NOTE — TELEPHONE ENCOUNTER
Pt calling requesting refill of Carvedilol.   RX has not been prescribed by Dr. Ma in the past.   Will be out of medication around [place].   Gillian Patricia, RN   [facility] RN Triage

## 2021-06-04 NOTE — TELEPHONE ENCOUNTER
Yohan is calling from [place].   Needs a refill of a medication.   See refill encounter from 12/21/19.  Gillian Patricia, RN   [facility] RN Triage    Reason for Disposition    Caller requesting a NON-URGENT new prescription or refill and triager unable to refill per unit policy    Protocols used: MEDICATION QUESTION CALL-A-

## 2021-06-04 NOTE — TELEPHONE ENCOUNTER
RN cannot approve Refill Request    RN can NOT refill this medication historical medication requested. Last office visit: 9/4/2019 Dimitri Ma MD Last Physical: Visit date not found Last MTM visit: Visit date not found Last visit same specialty: 9/4/2019 Dimitri Ma MD.  Next visit within 3 mo: Visit date not found  Next physical within 3 mo: Visit date not found      Gillian Patricia, [facility] Triage/Med Refill 12/21/2019    Requested Prescriptions   Pending Prescriptions Disp Refills     carvedilol (COREG) 3.125 MG tablet  1     Sig: Take 1 tablet (3.125 mg total) by mouth 2 (two) times a day with meals.       Beta-Blockers Refill Protocol Passed - 12/21/2019 10:50 AM        Passed - PCP or prescribing provider visit in past 12 months or next 3 months     Last office visit with prescriber/PCP: 9/4/2019 Dimitri Ma MD OR same dept: 9/4/2019 Dimitri Ma MD OR same specialty: 9/4/2019 Dimitri Ma MD  Last physical: Visit date not found Last MTM visit: Visit date not found   Next visit within 3 mo: Visit date not found  Next physical within 3 mo: Visit date not found  Prescriber OR PCP: Dimitri Ma MD  Last diagnosis associated with med order: There are no diagnoses linked to this encounter.  If protocol passes may refill for 12 months if within 3 months of last provider visit (or a total of 15 months).             Passed - Blood pressure filed in past 12 months     BP Readings from Last 1 Encounters:   09/13/19 140/60

## 2021-06-05 VITALS
TEMPERATURE: 97.9 F | OXYGEN SATURATION: 97 % | SYSTOLIC BLOOD PRESSURE: 127 MMHG | WEIGHT: 243 LBS | DIASTOLIC BLOOD PRESSURE: 61 MMHG | HEART RATE: 67 BPM | BODY MASS INDEX: 33.89 KG/M2

## 2021-06-05 NOTE — TELEPHONE ENCOUNTER
Refill Approved    Rx renewed per Medication Renewal Policy. Medication was last renewed on 9/4/19.    Luisa Oswald, [facility] Triage/Med Refill 1/7/2020     Requested Prescriptions   Pending Prescriptions Disp Refills     atorvastatin (LIPITOR) 40 MG tablet 90 tablet 2     Sig: Take 1 tablet (40 mg total) by mouth at bedtime.       Statins Refill Protocol (Hmg CoA Reductase Inhibitors) Passed - 1/6/2020  8:53 PM        Passed - PCP or prescribing provider visit in past 12 months      Last office visit with prescriber/PCP: 9/4/2019 Dimitri Ma MD OR same dept: 9/4/2019 Dimitri Ma MD OR same specialty: 9/4/2019 Dimitri Ma MD  Last physical: Visit date not found Last MTM visit: Visit date not found   Next visit within 3 mo: Visit date not found  Next physical within 3 mo: Visit date not found  Prescriber OR PCP: Dimitri Ma MD  Last diagnosis associated with med order: 1. Hypercholesterolemia  - atorvastatin (LIPITOR) 40 MG tablet; Take 1 tablet (40 mg total) by mouth at bedtime.  Dispense: 90 tablet; Refill: 2    If protocol passes may refill for 12 months if within 3 months of last provider visit (or a total of 15 months).

## 2021-06-05 NOTE — TELEPHONE ENCOUNTER
Refill Approved    Rx renewed per Medication Renewal Policy. Medication was last renewed on 9/4/19.    Ana Markham, [facility] Triage/Med Refill 2/2/2020     Requested Prescriptions   Pending Prescriptions Disp Refills     losartan (COZAAR) 50 MG tablet [Pharmacy Med Name: Losartan Potassium 50 MG Oral Tablet] 90 tablet 0     Sig: TAKE 1 TABLET BY MOUTH TWICE DAILY       Angiotensin Receptor Blocker Protocol Passed - 2/1/2020 10:46 AM        Passed - PCP or prescribing provider visit in past 12 months       Last office visit with prescriber/PCP: 9/4/2019 Dimitri Ma MD OR same dept: 9/4/2019 Dimitri Ma MD OR same specialty: 9/4/2019 Dimitri Ma MD  Last physical: Visit date not found Last MTM visit: Visit date not found   Next visit within 3 mo: Visit date not found  Next physical within 3 mo: Visit date not found  Prescriber OR PCP: Dimitri Ma MD  Last diagnosis associated with med order: 1. Essential hypertension, benign  - losartan (COZAAR) 50 MG tablet [Pharmacy Med Name: Losartan Potassium 50 MG Oral Tablet]; TAKE 1 TABLET BY MOUTH TWICE DAILY  Dispense: 90 tablet; Refill: 0    If protocol passes may refill for 12 months if within 3 months of last provider visit (or a total of 15 months).             Passed - Serum potassium within the past 12 months     Lab Results   Component Value Date    Potassium 4.9 09/04/2019             Passed - Blood pressure filed in past 12 months     BP Readings from Last 1 Encounters:   09/13/19 140/60             Passed - Serum creatinine within the past 12 months     Creatinine   Date Value Ref Range Status   09/04/2019 2.00 (H) 0.70 - 1.30 mg/dL Final

## 2021-06-07 NOTE — TELEPHONE ENCOUNTER
Patient Returning Call  Reason for call:  Message from clinic  Information relayed to patient:  Please reschedule physical  Patient has additional questions:  No  If YES, what are your questions/concerns:  None  Okay to leave a detailed message?: No call back needed     Patient was transferred to scheduling.

## 2021-06-08 NOTE — PATIENT INSTRUCTIONS - HE
Continue on your current medications.    Follow-up with me on July 7 in clinic, with fasting labs the day before.    You have been given Astelin nose spray to help reduce your nose drainage.  I suspect your nose drainage is from vasomotor rhinitis.    You have been given a referral to a retina specialist to evaluate your retina issue.  You will be contacted by a  to help you with that.    If you do begin having increasing angina symptoms when walking on a level surface, or increasing chest pain or shortness of breath, contact me right away for further evaluation.  You would consider repeating a stress test at that time.  If your angina symptoms are stable, continue current medical treatment plan.

## 2021-06-08 NOTE — PROGRESS NOTES
"Yohan Hayward is a 79 y.o. male who is being evaluated via a billable telephone visit.      The patient has been notified of following:     \"This telephone visit will be conducted via a call between you and your physician/provider. We have found that certain health care needs can be provided without the need for a physical exam.  This service lets us provide the care you need with a short phone conversation.  If a prescription is necessary we can send it directly to your pharmacy.  If lab work is needed we can place an order for that and you can then stop by our lab to have the test done at a later time.    Telephone visits are billed at different rates depending on your insurance coverage. During this emergency period, for some insurers they may be billed the same as an in-person visit.  Please reach out to your insurance provider with any questions.    If during the course of the call the physician/provider feels a telephone visit is not appropriate, you will not be charged for this service.\"    Patient has given verbal consent to a Telephone visit? Yes    What phone number would you like to be contacted at? [phone]     Patient would like to receive their AVS by AVS Preference: Carissa.    Additional provider notes:    [facility] phone consult    Yohan Hayward   79 y.o. male    Date of Visit: 5/6/2020    Chief Complaint   Patient presents with     Follow-up     Routine med review. Blood sugars highest when getting up in the morning, but stated that this has always been the case. BP's have been around 135-140/70 when checked at home.     Sinus Problem     Sinus drainage for years causing discomfort while laying down and too much nasal drainage. Noted Flonase had been ineffective.      Subjective  Yohan requested a phone consult to avoid clinic visit during the coronavirus outbreak.    Patient was scheduled for follow-up on multiple medical issues.    He is been in [place] most of " the winter and just got back to [place].  He has been quite active in [place], regular walking.    He does have a past history of coronary disease but has not had an MI.  In 2013 angiogram showed moderate diffuse disease including the LAD and RCA.  Unable to place a stent.  Circumflex was noted a small and possible source of the angina.    Patient has a stable fatigue with chest discomfort when walking up an incline.  Taking a nitroglycerin before his walk helps alleviate that symptom.  Nitroglycerin has been effective for him for his stable angina pattern.  He has not had escalation in his angina.    He was walking on a regular basis in [place], stable exertional ability on flat surface.    I did review the nuclear medicine pharmacologic stress test from September 2019 which was negative for ischemia.  Ejection fraction 67%.    He is on Lipitor 40 mg.  No significant muscle achiness.  On full dose aspirin without epigastric pain or history of GI bleeding.    I did review labs from September 2019 with an LDL of 104.  Normal liver test at that time.    Diabetes type 2 has been fairly well controlled with blood sugars averaging in the 140 range.  On Amaryl 4 mg in the morning.  He denies any low blood sugar events.  He does have a glucometer that is working now.  Hemoglobin A1c September of last year was 7.2%.    He has mild peripheral neuropathy but no foot sores.    Patient saw his usual eye doctor last year, was told he did not have any diabetic retinopathy, but there was some type of capillary leak condition with his retina and referral to retina specialist was recommended but patient had not made that appointment yet.  Patient denies any acute vision changes.    He did have a left parietal and occipital punctate stroke on MRI in 2014.  2014 MRA did not show severe stenosis or carotid artery stenosis.    Blood pressures been running 130-140 over 70s without orthostasis.    On his usual medication.  He is not  needed any hydralazine which he only uses for systolic above 200.    Patient does have a history of severe hypertensive encephalopathy back in 2016.    He has not had a sleep apnea evaluation.    I did review the heart echo from 2014 with a grade 1 diastolic dysfunction.  No aortic stenosis.  Ejection fraction 60-65% with normal valves.    Previous history reported to alcohol drinks every evening.    Lives independently with wife, retired  from [employer].    Quit smoking in 1981.  No new cough.    No recent gout.  He is using the Uloric every other day to save money.  September 2019 uric acid level 6.3.    His mother had colon cancer.  September 2015 colonoscopy was negative and no further colonoscopies planned with age.  Bowels are regular no blood in stool noted.    He has some clear chronic discharge from his nose, even through the winter, not seasonal.  Has had this for years.  No purulent drainage.  It does bother him and he is asking for possible nose spray.  He had used Flonase in the past without benefit.  No sinus pain or purulent discharge.  No wheezing or asthma history.    PMHx:  No past medical history on file.  PSHx:    Past Surgical History:   Procedure Laterality Date     ESOPHAGOGASTRODUODENOSCOPY  05/05/2011     Immunizations:   Immunization History   Administered Date(s) Administered     Influenza Whole 10/16/2013     Influenza high dose,seasonal,PF, 65+ yrs 09/14/2015, 10/27/2016, 09/04/2018     Influenza, Seasonal, Inj PF IIV3 10/10/2009     Influenza,seasonal, Inj IIV3 11/10/2011     Influenza,trivalent,im, 65+yrs 10/13/2017     Pneumo Conj 13-V (2010&after) 07/23/2015     Pneumo Polysac 23-V 10/31/2012     Tdap 10/31/2012       ROS A comprehensive review of systems was performed and was otherwise negative    Medications, allergies, and problem list were reviewed and updated    Exam  There were no vitals taken for this visit.  Phone consult    Assessment/Plan  1. Type 2 diabetes mellitus  without complication, without long-term current use of insulin (H)  Adequate blood sugar control.  Denies low blood sugars.  Chronic renal insufficiency, no metformin.    Can increase Amaryl to twice a day if needed.    Labs in July for hemoglobin A1c  - glimepiride (AMARYL) 2 MG tablet; Take 2 tablets (4 mg) by mouth every morning with meal.  Dispense: 180 tablet; Refill: 3  - Glycosylated Hemoglobin A1c; Future    Mild peripheral neuropathy without foot sores    2. Coronary artery disease due to calcified coronary lesion  Stable angina pattern.  Moderate diffuse disease.  Negative stress test September 2019.    Continue medical management with Lipitor and aspirin.    Patient was told if any increasing cardiac symptoms or more difficulty walking on level surface, to seek medical attention right away and consider repeat stress testing at that time.    1 year follow-up planned with Dr. Blackmon be in September    - Lipid Cascade; Future    3. Hypercholesterolemia  LDL of 104 last September on 40 mg a day of atorvastatin.  I would like to see his LDL least less than 80.    Consider increase to atorvastatin 80 mg this July.    4. Essential hypertension, benign  Blood pressure adequately controlled.  History of hypertensive encephalopathy without recurrent severe hypertension.    He has not had evaluation for sleep apnea, but it was not highly suspected previously.    Continue on current medications.    He does have hydralazine to use as needed but not needing.      - hydroCHLOROthiazide (MICROZIDE) 12.5 mg capsule; Take 1 capsule (12.5 mg) by mouth every 48 hours.  Dispense: 45 capsule; Refill: 3  - NIFEdipine (PROCARDIA XL) 30 MG 24 hr tablet; TAKE 1 TABLET BY MOUTH TWICE DAILY. SWALLOW WHOLE. DO NOT CRUSH OR CHEW.  Dispense: 180 tablet; Refill: 3  - losartan (COZAAR) 50 MG tablet; Take 1 tablet (50 mg total) by mouth 2 (two) times a day.  Dispense: 180 tablet; Refill: 3    5. Chronic renal insufficiency, stage 3  (moderate) (H)  Creatinine of 2.0 was stable last September. 2016 ultrasound reported negative for renal artery stenosis.    He no longer sees nephrology    6. History of gout  No recent recurrence.  Patient prefers to use the Uloric every other day to save money.  Patient was told if he begins to have more recurrent gout that he should be taking Uloric every day.    Patient should stay well-hydrated and maintain low purine diet and can reduce alcohol in the future if more gout.  - ULORIC 40 mg tablet; Take 1 tablet (40 mg total) by mouth daily.  Dispense: 90 tablet; Refill: 3  7. History of stroke  Small punctate stroke likely associated with severe hypertension.  No new neurologic event.    Aspirin and Lipitor.    8. Retinopathy  Report from his ophthalmologist of retina changes, needs further evaluation with a retina specialist.  Referral given  - Ambulatory referral to Retina Clinic    9. Vasomotor rhinitis  Chronic clear rhinorrhea discharge.  Likely vasomotor rhinitis.  Patient was told to contact me if worsening symptoms.  - azelastine (ASTELIN) 137 mcg (0.1 %) nasal spray; 2 sprays in each nostril up to 3 times a day as needed for runny nose.  Dispense: 30 mL; Refill: 12    10. Encounter for therapeutic drug monitoring  He will come in for lab work in July prior to his appointment.  - Comprehensive Metabolic Panel; Future    Family history of colon cancer, no further colonoscopies with age and multiple medical issues as above.    Return in 2 months (on 7/7/2020) for Recheck.   Patient Instructions   Continue on your current medications.    Follow-up with me on July 7 in clinic, with fasting labs the day before.    You have been given Astelin nose spray to help reduce your nose drainage.  I suspect your nose drainage is from vasomotor rhinitis.    You have been given a referral to a retina specialist to evaluate your retina issue.  You will be contacted by a  to help you with that.    If you do  begin having increasing angina symptoms when walking on a level surface, or increasing chest pain or shortness of breath, contact me right away for further evaluation.  You would consider repeating a stress test at that time.  If your angina symptoms are stable, continue current medical treatment plan.    Dimitri Ma MD        Current Outpatient Medications   Medication Sig Dispense Refill     aspirin 325 MG tablet Take 1 tablet by mouth daily.       atorvastatin (LIPITOR) 40 MG tablet Take 1 tablet (40 mg total) by mouth at bedtime. 90 tablet 2     blood glucose control, normal Soln by In Vitro route. Ly : for use as needed for calibration of meter, Code: E11.59       blood glucose test (GLUCOSE BLOOD) strips USE TO TEST BLOOD SUGAR 1-2 TIMES DAILY       BLOOD-GLUCOSE METER MISC 1 Each by Does not apply route two times a day. Contour next meter Use to test blood sugar once or twice daily as directed.       carvedilol (COREG) 3.125 MG tablet Take 1 tablet (3.125 mg total) by mouth 2 (two) times a day with meals. 180 tablet 2     glimepiride (AMARYL) 2 MG tablet Take 2 tablets (4 mg) by mouth every morning with meal. 180 tablet 3     hydroCHLOROthiazide (MICROZIDE) 12.5 mg capsule Take 1 capsule (12.5 mg) by mouth every 48 hours. 45 capsule 3     losartan (COZAAR) 50 MG tablet Take 1 tablet (50 mg total) by mouth 2 (two) times a day. 180 tablet 3     NIFEdipine (PROCARDIA XL) 30 MG 24 hr tablet TAKE 1 TABLET BY MOUTH TWICE DAILY. SWALLOW WHOLE. DO NOT CRUSH OR CHEW. 180 tablet 3     nitroglycerin (NITROSTAT) 0.4 MG SL tablet Place 1 tablet (0.4 mg total) under the tongue every 5 (five) minutes as needed for chest pain. 25 tablet 3     ULORIC 40 mg tablet Take 1 tablet (40 mg total) by mouth daily. 90 tablet 3     azelastine (ASTELIN) 137 mcg (0.1 %) nasal spray 2 sprays in each nostril up to 3 times a day as needed for runny nose. 30 mL 12     hydrALAZINE (APRESOLINE) 25 MG tablet Take 1 Tab by mouth every six  hours as needed (for blood pressure over 200 systolic.).       No current facility-administered medications for this visit.      Allergies   Allergen Reactions     Allopurinol Other (See Comments)     Other reaction(s): Chest Tightness  Gave pt pains in chest like a heart attack  Pain       Social History     Tobacco Use     Smoking status: Former Smoker     Packs/day: 1.50     Years: 15.00     Pack years: 22.50     Types: Cigarettes     Last attempt to quit:      Years since quittin.3     Smokeless tobacco: Never Used   Substance Use Topics     Alcohol use: Yes     Alcohol/week: 14.0 standard drinks     Types: 14 Shots of liquor per week     Drug use: Not on file           Phone call duration: 16 minutes    Deneen Gutierrez, CMA

## 2021-06-09 NOTE — TELEPHONE ENCOUNTER
Since he is allergic to allopurinol, there is not an easy alternative for Uloric.    Probenecid can be used for gout prevention, but that is a different type of medication.    Have patient discuss this issue with [facility] pharmacist for a pharmacy consult to review options and determine if a cheaper way to get Uloric would be possible.

## 2021-06-09 NOTE — TELEPHONE ENCOUNTER
LVM informing pt that he was likely contacted due to needing to r/s his upcoming appt on 10/2 to the following week due to PCP now being out of the clinic that week. Instructed pt to call back to reschedule.

## 2021-06-09 NOTE — TELEPHONE ENCOUNTER
Generic is still pretty expensive at $443. Pharmacy left a msg for patient letting them know. Please let us know if you find another option. Thank you.

## 2021-06-09 NOTE — TELEPHONE ENCOUNTER
Forwarding to [facility].  Marlene Lao CMA ............... 4:17 PM, 07/22/20

## 2021-06-09 NOTE — PATIENT INSTRUCTIONS - HE
Continue with current medications.    Contact clinic for further evaluation if any worsening dizziness, unsteadiness or other neurologic changes.    Plan to follow-up with your cardiologist in September for your yearly checkup.    Contact clinic if any worsening chest pain or worsening shortness of breath with activity.    Try to improve diabetic diet and regular daily walking.  Avoid alcohol in the evening.  Contact clinic if any low blood sugars less than 80.    Follow-up with me in approximately 3 months for diabetic checkup before you go to [place].

## 2021-06-09 NOTE — TELEPHONE ENCOUNTER
Patient Returning Call  Reason for call:  unknown  Information relayed to patient:  Patient states he had a message today to  Call clinic. No encounter noted. Please call patient  Patient has additional questions:  No  If YES, what are your questions/concerns:  NA  Okay to leave a detailed message?: Yes

## 2021-06-09 NOTE — TELEPHONE ENCOUNTER
Fax received from [facility] that Uloric 40 mg is not covered under patient's insurance plan. Please start PA.    Key: [id number]    Marlene Lao CMA ............... 5:07 PM, 07/14/20

## 2021-06-09 NOTE — PROGRESS NOTES
[facility] Follow Up Note    Yohan Hayward   79 y.o. male    Date of Visit: 7/7/2020    Chief Complaint   Patient presents with     Follow-up     Routine checkup     Subjective  Yohan is here for routine follow-up.    He did have labs prior to his appointment and I did review those with him today.    Diabetes type 2 with hemoglobin A1c of 7.5%.  On Amaryl 4 mg in the morning.  He denies any low blood sugar events.    Some mild peripheral neuropathy but no foot sores.    I did review the ophthalmology note from May of this year and no retinopathy.    History of branch vein retinal occlusion with neovascularization, previous Avastin shots.  He was just seen 2 weeks ago by his retina specialist and told things were stable and no new injections at this time.    Coronary artery disease but no history of MI.  2013 angiogram with moderate diffuse disease of the RCA and LAD.  No stent placed.  He had a small circumflex that was felt to be possibly the source of his chronic stable angina.    He continues to get a very mild chest pressure fatigue feeling when walking up an incline, uses nitroglycerin with some benefit.    He is not had any worsening exertional symptoms.  No chest pain when walking on flat surface.    I did review September 2019 nuclear stress test which was negative for ischemia.    2014 echo showed grade 1 diastolic dysfunction otherwise normal.    Occasional palpitations consistent with premature beats.    Suspected sleep apnea but he is not had an evaluation.    In 2016 he had severe hypertensive encephalopathy.    2014 he did have a left parietal and occipital punctate stroke noted on MRI at that time.    2014 MRA did not show any severe intracranial stenosis or carotid artery stenosis.    He has not had any new acute neurologic changes.    He is walking on a daily basis.  No falls.    He does state over the past few months he feels like he does drift to the right when he tries to  walk in a straight line, but he states that stable and no falls.    No vertigo type symptoms or new nausea.    No new diffuse myalgias on 40 mg a day atorvastatin.    Yesterday LDL was 74 and HDL 35 triglycerides 230.    He does drink some alcohol in the evening and does admit to need for improving diet.    He is not been walking quite as much as usual but still trying to walk daily.    He does have a borderline ALT of 48, normal AST.    He does have chronic renal insufficiency and hypertension.  Blood pressure has been controlled in the 130s over 60s.  He has some very mild orthostasis when he stands up quickly and starts walking, but that stable and fairly mild.  No syncopal episodes.    He still on losartan 50 mg twice daily, HCTZ 12.5 mg every other day, carvedilol 3.125 mg twice a day and Procardia XL 30 mg twice daily.    He has hydralazine on his medication list but does not use.    His creatinine yesterday was 2.0 and normal potassium.  As a stable creatinine.  He no longer sees nephrology.    No recent gout on Uloric.    No new cough.  Quit smoking in 1981.    Mother had colon cancer.  September 2015 colonoscopy negative and no further plan.  No changes in bowels or blood in stool.        PMHx:  No past medical history on file.  PSHx:    Past Surgical History:   Procedure Laterality Date     ESOPHAGOGASTRODUODENOSCOPY  05/05/2011     Immunizations:   Immunization History   Administered Date(s) Administered     Influenza Whole 10/16/2013     Influenza high dose,seasonal,PF, 65+ yrs 09/14/2015, 10/27/2016, 09/04/2018     Influenza, Seasonal, Inj PF IIV3 10/10/2009     Influenza,seasonal, Inj IIV3 11/10/2011     Influenza,trivalent,im, 65+yrs 10/13/2017     Pneumo Conj 13-V (2010&after) 07/23/2015     Pneumo Polysac 23-V 10/31/2012     Tdap 10/31/2012       ROS A comprehensive review of systems was performed and was otherwise negative    Medications, allergies, and problem list were reviewed and  updated    Exam  /54 (Patient Site: Right Arm, Patient Position: Sitting, Cuff Size: Adult Large)   Pulse 88   Wt (!) 243 lb (110.2 kg)   BMI 33.89 kg/m    Alert and oriented.  His gait is straight, non-ataxic and no parkinsonian symptoms.  Moving legs well.  Able to climb up on the exam table.  Lungs are clear to auscultation.  Heart is regular without murmur.  Abdomen is nontender and no ankle edema.    Assessment/Plan  1. Type 2 diabetes mellitus without complication, without long-term current use of insulin (H)  Adequately controlled with hemoglobin A1c of 7.5%.    Avoiding metformin with his renal insufficiency.    Could consider increase of Amaryl to add a 2 mg dose with supper if needed.    Patient was warned about risk of low blood sugars with this medication.    May need to consider a low-dose Ozempic if diabetes goes out of control.    2. Coronary artery disease due to calcified coronary lesion  Stable exertional angina with fatigue and a slight chest pressure feeling when walking up an incline.  The symptoms are stable.  Negative stress test September 2019.    Small circumflex artery hypothesized as cause of stable angina in the past.    Continue aspirin daily and Lipitor 40 mg.    He is due for his yearly heart exam in September with cardiology.    3. Hypercholesterolemia  Lipitor 40 mg with well-controlled cholesterol yesterday.  Still low HDL and high triglycerides.    I did  patient to improve diet and exercise and reduce alcohol.    4. Essential hypertension, benign  Controlled.  Borderline low diastolic.  He does have some mild to moderate orthostatic symptoms fairly consistently.  Patient was told if worsening orthostatic symptoms or lightheaded dizzy spells, to contact clinic.  He may need to reduce the Procardia if worsening orthostasis.    5. Chronic renal insufficiency, stage 3 (moderate) (H)  Stable creatinine, continues on losartan twice daily in addition above medication.  He  no longer sees nephrology.    6. History of gout  No recent recurrence, on Uloric.  Uric acid level 6.3 last September    7. History of stroke  Punctate stroke and history of hypertensive encephalopathy.  I suspect he has hypertensive cerebral disease.  Suspected sleep apnea.  He did not want a sleep study.  Currently maintaining good blood pressure control.  Patient was told to seek medical attention immediately if any new neurologic changes.    Unclear if the slight feeling to lean to the right when walking has been present since his stroke many years ago.  He does not appear to have any gross neurologic changes currently.  Monitor for any further neurologic changes.  Med management as above with aspirin and Lipitor.    Family history of colon cancer with mother, no further colonoscopies with age and cardiac condition above.    Continue follow closely with his retina specialist for the history of neovascularization and retinal vein occlusion.    Return in about 15 weeks (around 10/20/2020) for Recheck.   Patient Instructions   Continue with current medications.    Contact clinic for further evaluation if any worsening dizziness, unsteadiness or other neurologic changes.    Plan to follow-up with your cardiologist in September for your yearly checkup.    Contact clinic if any worsening chest pain or worsening shortness of breath with activity.    Try to improve diabetic diet and regular daily walking.  Avoid alcohol in the evening.  Contact clinic if any low blood sugars less than 80.    Follow-up with me in approximately 3 months for diabetic checkup before you go to [place].    Dimitri Ma MD        Current Outpatient Medications   Medication Sig Dispense Refill     aspirin 325 MG tablet Take 1 tablet by mouth daily.       atorvastatin (LIPITOR) 40 MG tablet Take 1 tablet (40 mg total) by mouth at bedtime. 90 tablet 2     azelastine (ASTELIN) 137 mcg (0.1 %) nasal spray 2 sprays in each nostril up to 3 times a  day as needed for runny nose. 30 mL 12     blood glucose control, normal Soln by In Vitro route. Ly : for use as needed for calibration of meter, Code: E11.59       blood glucose test (GLUCOSE BLOOD) strips USE TO TEST BLOOD SUGAR 1-2 TIMES DAILY       BLOOD-GLUCOSE METER MISC 1 Each by Does not apply route two times a day. Contour next meter Use to test blood sugar once or twice daily as directed.       carvedilol (COREG) 3.125 MG tablet Take 1 tablet (3.125 mg total) by mouth 2 (two) times a day with meals. 180 tablet 2     glimepiride (AMARYL) 2 MG tablet Take 2 tablets (4 mg) by mouth every morning with meal. 180 tablet 3     hydrALAZINE (APRESOLINE) 25 MG tablet Take 1 Tab by mouth every six hours as needed (for blood pressure over 200 systolic.).       hydroCHLOROthiazide (MICROZIDE) 12.5 mg capsule Take 1 capsule (12.5 mg) by mouth every 48 hours. 45 capsule 3     losartan (COZAAR) 50 MG tablet Take 1 tablet (50 mg total) by mouth 2 (two) times a day. 180 tablet 3     NIFEdipine (PROCARDIA XL) 30 MG 24 hr tablet TAKE 1 TABLET BY MOUTH TWICE DAILY. SWALLOW WHOLE. DO NOT CRUSH OR CHEW. 180 tablet 3     nitroglycerin (NITROSTAT) 0.4 MG SL tablet Place 1 tablet (0.4 mg total) under the tongue every 5 (five) minutes as needed for chest pain. 25 tablet 3     ULORIC 40 mg tablet Take 1 tablet (40 mg total) by mouth daily. 90 tablet 3     No current facility-administered medications for this visit.      Allergies   Allergen Reactions     Allopurinol Other (See Comments)     Other reaction(s): Chest Tightness  Gave pt pains in chest like a heart attack  Pain       Social History     Tobacco Use     Smoking status: Former Smoker     Packs/day: 1.50     Years: 15.00     Pack years: 22.50     Types: Cigarettes     Last attempt to quit: 1981     Years since quittin.5     Smokeless tobacco: Never Used   Substance Use Topics     Alcohol use: Yes     Alcohol/week: 14.0 standard drinks     Types: 14 Shots of liquor  per week     Drug use: Not on file

## 2021-06-09 NOTE — TELEPHONE ENCOUNTER
Central PA team  [phone]  Pool: [facility] PA MED ([id number])          PA has been initiated.       PA form completed and faxed insurance via Cover My Meds     Key:  [id number] - PA Case ID: [id number] - Rx #: [id number]     Medication:  Uloric 40MG tablets    Insurance:  [employer] Part D        Response will be received via fax and may take up to 5-10 business days depending on plan

## 2021-06-09 NOTE — TELEPHONE ENCOUNTER
"Request for a generic alternative to Uloric 40 mg tab's received from patient's [facility] pharmacy via fax. Per pharmacy- \"can we have a new Rx for generic? Name brand is still $533.XX 7/20/20.\" Please order an alternate med if able to do so.    "

## 2021-06-09 NOTE — TELEPHONE ENCOUNTER
Patient Returning Call  Reason for call:  Returning call  Information relayed to patient:    Relayed below message to patient.  Patient has additional questions:  No  If YES, what are your questions/concerns:    No additional questions at this time.  Transferred caller to scheduling.  Okay to leave a detailed message?: No call back needed

## 2021-06-10 NOTE — PROGRESS NOTES
MTM Initial Encounter  Assessment & Plan                                                     1. Gout  Well controlled on febuxostat for prophylaxis, but cost has increased. Will submit tier exception request to insurance as he has allergy/intolerance to allopurinol. Other option is probenecid, but not ideal given CKD. Will likely be less effective and there are drug interactions to consider. Could also consider stopping thiazide diuretic as this may be contributing to gout risk, although every other day dosing has been better tolerated per patient report.   Plan   1. Tier exception request: febuxostat.     2. Type 2 diabetes mellitus   Most recent A1c 7.5%, increased from previous, but meeting goal of <8% based on age per ADA guidelines. Encouraged getting back on track in regards to exercise and healthy diet.     3. CAD/hypertension  Blood pressure is well controlled and meeting goal of <140/90 mm Hg per JNC-8 hypertension guidelines.     4. Hyperlipidemia  Appropriately on a high intensity statin given ASCVD per ACC/AHA guidelines. Last LDL of 74 mg/dl.     5. CKD (chronic kidney disease) stage 3, GFR 30-59 ml/min   Most recent creatinine of 2.06 mg/dl, with eGFR of 31 ml/min and CrCl of 37 ml/min using adjusted body weight.    Follow Up  PRN with MTM    Subjective & Objective                                                     Yohan Hayward is a 79 y.o. male called for an initial visit for Medication Therapy Management. He was referred to me from Dimitri Ma MD    Patient consented to a telehealth visit: Yes  Chief Complaint: discuss gout medication  Medication Adherence/Access: Good; no issues identified.    1. Gout  Yohan is taking Urloric 40 mg daily. This has been working well with no gout attack since starting, but now cost is very high, even with generic. Cost would be over $400 for 3 month supply.   He used to be on allopurinol. He developed chest pain and had to be seen in ER. He tried taking  NTG and it was not effective. He has tried allopurinol a second time and had the same reaction.     Lab Results   Component Value Date    URICACID 6.3 09/04/2019       2. Type 2 diabetes mellitus   Yohan is taking glimepiride 4 mg every morning with breakfast. He does check blood sugars regularly. In the morning typically 140-150 and he says this is when it's highest.  His last A1c was a bit higher and he thinks this may be due to more inactivity lately. No low blood sugars or hypoglycemia symptoms.     Lab Results   Component Value Date    HGBA1C 7.5 (H) 07/06/2020    HGBA1C 7.2 (H) 09/04/2019    HGBA1C 6.9 (H) 10/29/2018     Lab Results   Component Value Date    LDLCALC 74 07/06/2020    CREATININE 2.06 (H) 07/06/2020       3. CAD/hypertension  He has a history of CAD as indicated on angiogram, but no stent placed. No MI history. Prior punctate stroke and history of hypertensive encephalopathy. He takes losartan 50 mg two times a day, nifedipine XL 30 mg daily, carvedilol 3.125 mg two times a day. He takes HCTZ 12.5 mg every other day otherwise it causes gout flare. Rarely sees swelling in his ankles. He has hydralazine available to use PRN if SBP >200, but has never actually used. He takes aspirin 325 mg daily, which was recommended by cardiology. No s/sx bleeding. He has NTG available if needed for chest pain.     4. Hyperlipidemia  He takes atorvastatin 40 mg daily. No adverse effects noted.     5. CKD (chronic kidney disease) stage 3, GFR 30-59 ml/min   Most recent creatinine of 2.06 mg/dl, with eGFR of 31 ml/min and CrCl of 37 ml/min using adjusted body weight.     Lab Results   Component Value Date    CREATININE 2.06 (H) 07/06/2020    BUN 34 (H) 07/06/2020     07/06/2020    K 4.4 07/06/2020     07/06/2020    CO2 21 (L) 07/06/2020         PMH: reviewed in EPIC   Allergies/ADRs: reviewed in EPIC   Alcohol: 1-2 drinks per night  Tobacco:   Social History     Tobacco Use   Smoking Status Former  Smoker     Packs/day: 1.50     Years: 15.00     Pack years: 22.50     Types: Cigarettes     Last attempt to quit: 1981     Years since quittin.5   Smokeless Tobacco Never Used     Vitals:   BP Readings from Last 3 Encounters:   20 128/54   19 140/60   19 132/70     Pulse Readings from Last 3 Encounters:   20 88   19 76   19 68     Wt Readings from Last 3 Encounters:   20 (!) 243 lb (110.2 kg)   19 (!) 248 lb (112.5 kg)   19 (!) 248 lb (112.5 kg)     ----------------    The patient was given a summary of these recommendations via [employer]    I spent 30 minutes with this patient today.   All changes were made via collaborative practice agreement with Dimitri Ma MD. A copy of the visit note was provided to the patient's provider.     Monique Stacy, PharmD, BCACP  Medication Management (MTM) Pharmacist  [facility]     Telemedicine Visit Details    Type of service:  Telephone     Start Time: 2:00 PM  End Time: 2:30 PM    Originating Location (pt. Location): Home    Distant Location (provider location):  [facility] MEDICATION THERAPY MANAGEMENT [facility]    Mode of Communication: Telephone    Current Outpatient Medications   Medication Sig Dispense Refill     aspirin 325 MG tablet Take 1 tablet by mouth daily.       atorvastatin (LIPITOR) 40 MG tablet Take 1 tablet (40 mg total) by mouth at bedtime. 90 tablet 2     azelastine (ASTELIN) 137 mcg (0.1 %) nasal spray 2 sprays in each nostril up to 3 times a day as needed for runny nose. 30 mL 12     blood glucose control, normal Soln by In Vitro route. Ly : for use as needed for calibration of meter, Code: E11.59       blood glucose test (GLUCOSE BLOOD) strips USE TO TEST BLOOD SUGAR 1-2 TIMES DAILY       BLOOD-GLUCOSE METER MISC 1 Each by Does not apply route two times a day. Contour next meter Use to test blood sugar once or twice daily as directed.        carvedilol (COREG) 3.125 MG tablet Take 1 tablet (3.125 mg total) by mouth 2 (two) times a day with meals. 180 tablet 2     glimepiride (AMARYL) 2 MG tablet Take 2 tablets (4 mg) by mouth every morning with meal. 180 tablet 3     hydrALAZINE (APRESOLINE) 25 MG tablet Take 1 Tab by mouth every six hours as needed (for blood pressure over 200 systolic.).       hydroCHLOROthiazide (MICROZIDE) 12.5 mg capsule Take 1 capsule (12.5 mg) by mouth every 48 hours. 45 capsule 3     losartan (COZAAR) 50 MG tablet Take 1 tablet (50 mg total) by mouth 2 (two) times a day. 180 tablet 3     NIFEdipine (PROCARDIA XL) 30 MG 24 hr tablet TAKE 1 TABLET BY MOUTH TWICE DAILY. SWALLOW WHOLE. DO NOT CRUSH OR CHEW. 180 tablet 3     nitroglycerin (NITROSTAT) 0.4 MG SL tablet Place 1 tablet (0.4 mg total) under the tongue every 5 (five) minutes as needed for chest pain. 25 tablet 3     ULORIC 40 mg tablet Take 1 tablet (40 mg total) by mouth daily. 90 tablet 3     No current facility-administered medications for this visit.

## 2021-06-10 NOTE — TELEPHONE ENCOUNTER
Central PA team  [phone]  Pool: [place] PA MED ([id number])          Tier Exception has been initiated.       PA form completed and faxed insurance via Cover My Meds     Key:   [id number]     Medication:  Febuxostat 40MG tablets    Insurance:  [employer] Part D        Response will be received via fax and may take up to 5-10 business days depending on plan

## 2021-06-10 NOTE — TELEPHONE ENCOUNTER
Prior Authorization Request **Tier Exception**  Who s requesting:  Patient  Pharmacy Name and Location: [facility]  Medication Name: Febuxostat 40 mg   Insurance Plan: Medicare/[employer]  Rationale:  Requesting tier reduction as patient is unable to tolerate covered, formulary medication, allopurinol due to allergy. Other alternative, probenecid, is not ideal given patient's stage 3 CKD. Generic febuxostat is >$400 for 3 month supply so requesting tier exception to lower cost.   CoverMyMeds Key: N/A  Informed patient that prior authorizations can take up to 10 business days for response:   Yes  Okay to leave a detailed message: Yes             *Please forward response to Monique Stacy, SandhyaD*

## 2021-06-10 NOTE — PROGRESS NOTES
7/28 Update:   Unfortunately tier exception for febuxostat was denied. Insurance company will not michael tier exceptions on medications that were first approved via prior authorization.   Called and informed patient. Cost is $443/90 days. Large portion of this is going towards deductible, then copay will likely be lower. Discussed option of continuing febuxostat or changing to probenecid. Ideally would continue febuxostat given efficacy and potential diminished efficacy of probenecid with his CKD as well as drug interactions. He is able to afford febuxostat and would prefer to continue as well.

## 2021-06-12 NOTE — TELEPHONE ENCOUNTER
LMTCB to schedule with available provider such as Dr. Tay Neely or Fela Shaw CNP, for an appt as soon as this Wednesday.

## 2021-06-12 NOTE — PROGRESS NOTES
Office Visit - Follow Up   Yohan Hayward   79 y.o. male    Date of Visit: 10/27/2020    Chief Complaint   Patient presents with     Diabetes     glucose running high - f/u        -------------------------------------------------------------------------------------------------------------------------  Assessment and Plan    Poorly controlled type 2 diabetes, has felt well since starting low-dose Ozempic on October 11, 2020, has had 3 injections thus far, will increase to Ozempic 0.5 mg/week, but he needs to make drastic lifestyle changes to get his diabetes under control, namely cutting out alcohol, reducing overall calories, and cutting back on starchy foods sugary foods.  Because of an A1c of 8.4, he started Ozempic on October 11, injecting every Sunday.  He still getting elevated morning blood sugar readings, for example about 180 this morning.  I quizzed him on what he ate for supper last night, and he reported roast beef with ketchup, a small sweet potato but that included brown sugar and butter, and also two scotch and sodas, each of which consists of about a shot and a half of scotch.  I told him he needs to make some big changes.  The added sugar needs to stop.  I reminded him ketchup and many sauces are loaded with sugar.  I would strongly recommend he cut the alcohol out entirely.  That will help his liver enzymes get back to normal.  I told him that his morning blood sugars reflect the spillover of what ever he ate the night before.      He is about to depart for his winter home in [place], leaving on Saturday, October 31, going by automobile, and plans to return to [place] in May 2021.  I am going to give him an after visit summary that contains all his medical issues, and he could show that to a medical professional in [place].    His other diabetes medications are the sulfonylurea glimepiride 2 mg tablet, taken as 2 tablets equals 4 mg every morning with breakfast.  Ozempic will be increasing to  0.5 mg/week.    He should get an A1c checked in about 3 months, meaning approximately January 2021.  He could report that result to us via [employer].  At that blood draw in [place] in January, please also get a comprehensive metabolic panel, blood cell counts, and lipid profile.    Chronic renal insufficiency in the context of diabetes, GFR approximately 31.  For that reason he no longer takes metformin.  Ozempic does not require dose adjustment for kidney function.  I reminded him that preserving his kidney function for the long-term involves controlling blood pressure and diabetes.  He is on losartan 50 mg twice a day which will help preserve kidney function.    Elevated liver transaminases probably from effects of alcohol and inadequately controlled diabetes.  October 8, 2020 ALT 61, AST 56.  Normal alkaline phosphatase and bilirubin.  In response to the liver test, he reduced his atorvastatin dose from 40 mg down to 20 mg a day.  I told her that he can go back to 40 mg a day, but must cut out the alcohol.  Needs to recheck his metabolic panel including these liver chemistry tests in January 2021 while he is in [place].  The magnitude of the elevation is mild.    Hypertension in the context of renal insufficiency and diabetes.  His antihypertensive medications are losartan 50 mg twice a day, hydrochlorothiazide 12.5 mg once a day, hydralazine 25 mg every 6 hours as needed for blood pressure over 200 systolic, carvedilol 3.125 mg twice a day, and nifedipine extended release 30 mg once a day.  In clinic blood pressure today October 27 looks good at 127/61 which I would consider right at target.  I reminded him to bring his home blood pressure machine with him to [place], and check his blood pressure every day, recording the numbers in a log book.  If he can lose weight, and also cut out the alcohol, that might reduce his dependence on blood pressure medication.  It would be nice to see him no longer need the  hydralazine, and maybe consider getting off of the hydrochlorothiazide or the nifedipine.  I anticipate that he will need to be on the losartan and carvedilol long-term.    Gout on fubuxostat in the context of chronic renal insufficiency, on 40 mg once a day.    Hyperlipidemia in the context of coronary disease and diabetes, on high intensity atorvastatin 40 mg at bedtime.  I told him to resume the 40 mg dose.    Coronary artery disease, no symptoms of angina or heart failure at present.  Had satisfactory cardiac stress test in September 2019.  No history of MI.  2013 angiogram with moderate diffuse disease of the LAD and right coronary arteries, but no stents.  2014 echocardiogram with grade 1 diastolic dysfunction.   Carvedilol is for cardiac protection as well as blood pressure control.  On high intensity lipid-lowering.  Takes a full-size aspirin 325 mg a day.    History of transient ischemic attack 2016, no residual effects.    Suspected obstructive sleep apnea, but this has not yet been formally tested.  Getting rid of the alcohol and losing weight would help reduce the tendency to have sleep apnea.    Obesity in the context of type 2 diabetes, coronary disease, hypertension, body mass index 33.9.  I asked him to set a goal to try to trim off about 10 pounds by the end of 2020, and then another 25 pounds in 2021.  I want him doing moderate physical activity sustained for at least 45 minutes every day.  Walking or strength training would be great.    Colon screening current last colonoscopy September 2015.  Family history colon cancer (mother).    Already received seasonal flu vaccine.      --------------------------------------------------------------------------------------------------------------------------  History of Present Illness  This 79 y.o. old man comes in for follow-up of diabetes    Poorly controlled type 2 diabetes, has felt well since starting low-dose Ozempic on October 11, 2020, has had 3  injections thus far, will increase to Ozempic 0.5 mg/week, but he needs to make drastic lifestyle changes to get his diabetes under control, namely cutting out alcohol, reducing overall calories, and cutting back on starchy foods sugary foods.  Because of an A1c of 8.4, he started Ozempic on October 11, injecting every Sunday.  He still getting elevated morning blood sugar readings, for example about 180 this morning.  I quizzed him on what he ate for supper last night, and he reported roast beef with ketchup, a small sweet potato but that included brown sugar and butter, and also two scotch and sodas, each of which consists of about a shot and a half of scotch.  I told him he needs to make some big changes.  The added sugar needs to stop.  I reminded him ketchup and many sauces are loaded with sugar.  I would strongly recommend he cut the alcohol out entirely.  That will help his liver enzymes get back to normal.  I told him that his morning blood sugars reflect the spillover of what ever he ate the night before.      He is about to depart for his winter home in [place], leaving on Saturday, October 31, going by automobile, and plans to return to [place] in May 2021.  I am going to give him an after visit summary that contains all his medical issues, and he could show that to a medical professional in [place].    His other diabetes medications are the sulfonylurea glimepiride 2 mg tablet, taken as 2 tablets equals 4 mg every morning with breakfast.  Ozempic will be increasing to 0.5 mg/week.    Morning BS reading 186  Afternoon better 130.  Lab Results   Component Value Date    HGBA1C 8.4 (H) 10/08/2020       Started Ozempic, 3 weekly injections thus far  Start October 11, 2020 Sunday     BUN 8 - 28 mg/dL 34High      Creatinine 0.70 - 1.30 mg/dL 2.06High      GFR MDRD Af Amer >60 mL/min/1.73m2 38Low      GFR MDRD Non Af Amer >60 mL/min/1.73m2 31Low       Lab Results   Component Value Date    ALT 61 (H)  10/08/2020    AST 56 (H) 10/08/2020    ALKPHOS 70 10/08/2020    BILITOT 0.6 10/08/2020     2 scotch and sodas power night  Each drink is a shot and a half    Off metformin for several years    This is for    Diabetes type 2 with significant nephropathy with a creatinine of 2.0.   Ma 22 2020 eye exam    History of severe hypertension with hypertensive encephalopathy in 2016 with a past history of a left parietal and occipital punctate strokes on MRI in 2014.  There was no severe intracranial stenosis or carotid artery stenosis on previous MRA.     He has chronic mild balance issues but no falls.     He has coronary artery disease.  No history of MI.  2013 angiogram with moderate diffuse disease of the LAD and RCA but no stents.  He has a small circumflex with stenosis that is felt to be the cause of his chronic angina.  He has some slight chest pressure when walking up an incline but has not had any chest pain episodes over the past year.     September 2019 nuclear stress test was negative for ischemia.     2014 echo showed grade 1 diastolic dysfunction.  No lower extremity edema or increasing shortness of breath.  He is moderately overweight.     Suspected sleep apnea but he is not wanted evaluation in the past.     No palpitations or history of arrhythmia.     Diabetes has been moderately well controlled with hemoglobin A1c of 7.5% in July.  His blood sugar during the day is usually around 120.  Blood sugar in the morning is often at 180.     He takes Amaryl 4 mg in the morning.       He is on Procardia XL 30 mg twice a day, losartan 50 mg twice a day, carvedilol 3.125 mg twice a day, and HCTZ 12.5 mg every other day.     No recent gout.  He did get the generic Uloric now.  He has an allergy to allopurinol.     Ophthalmology in May and no diabetic retinopathy.     Past history of branch vein occlusion but no longer on Avastin shots.  He is due to see ophthalmology again next May.     He remains active with regular  walking.  No foot sores or leg claudication.     No new neurologic events.     Quit smoking in 1981.  No cough or fever.     September 2015 colonoscopy was negative and no further planned with age.  His mother did have colon cancer, however.      Wt Readings from Last 3 Encounters:   10/27/20 (!) 243 lb (110.2 kg)   10/08/20 (!) 245 lb (111.1 kg)   07/07/20 (!) 243 lb (110.2 kg)     BP Readings from Last 3 Encounters:   10/27/20 127/61   10/08/20 150/70   07/07/20 128/54       Lab Results   Component Value Date    CHOL 155 07/06/2020    TRIG 230 (H) 07/06/2020    HDL 35 (L) 07/06/2020    LDLDIRECT 104 09/04/2019    ALT 61 (H) 10/08/2020    AST 56 (H) 10/08/2020     10/08/2020    K 4.6 10/08/2020     10/08/2020    CREATININE 2.05 (H) 10/08/2020    BUN 36 (H) 10/08/2020    CO2 26 10/08/2020    HGBA1C 8.4 (H) 10/08/2020        Review of Systems: A comprehensive review of systems was negative except as noted.  ---------------------------------------------------------------------------------------------------------------------------    Medications, Allergies, Social, and Problem List   Current Outpatient Medications   Medication Sig Dispense Refill     aspirin 325 MG tablet Take 1 tablet by mouth daily.       atorvastatin (LIPITOR) 40 MG tablet Take 1 tablet (40 mg total) by mouth at bedtime. 90 tablet 2     azelastine (ASTELIN) 137 mcg (0.1 %) nasal spray 2 sprays in each nostril up to 3 times a day as needed for runny nose. 30 mL 12     blood glucose test (GLUCOSE BLOOD) strips USE TO TEST BLOOD SUGAR 1-2 TIMES DAILY       carvedilol (COREG) 3.125 MG tablet Take 1 tablet (3.125 mg total) by mouth 2 (two) times a day with meals. 180 tablet 2     febuxostat (ULORIC) 40 mg tablet Take 1 tablet (40 mg total) by mouth daily. 90 tablet 3     glimepiride (AMARYL) 2 MG tablet Take 2 tablets (4 mg) by mouth every morning with meal. 180 tablet 3     hydrALAZINE (APRESOLINE) 25 MG tablet Take 1 Tab by mouth every six  hours as needed (for blood pressure over 200 systolic.).       hydroCHLOROthiazide (MICROZIDE) 12.5 mg capsule Take 1 capsule (12.5 mg) by mouth every 48 hours. 45 capsule 3     losartan (COZAAR) 50 MG tablet Take 1 tablet (50 mg total) by mouth 2 (two) times a day. 180 tablet 3     NIFEdipine (PROCARDIA XL) 30 MG 24 hr tablet TAKE 1 TABLET BY MOUTH TWICE DAILY. SWALLOW WHOLE. DO NOT CRUSH OR CHEW. 180 tablet 3     nitroglycerin (NITROSTAT) 0.4 MG SL tablet DISSOLVE ONE TABLET UNDER THE TONGUE EVERY 5 MINUTES AS NEEDED FOR CHEST PAIN.  DO NOT EXCEED A TOTAL OF 3 DOSES IN 15 MINUTES 25 tablet 2     semaglutide 0.25 mg or 0.5 mg(2 mg/1.5 mL) PnIj Inject 0.25 mg under the skin once a week. 3 mL 5     No current facility-administered medications for this visit.      Allergies   Allergen Reactions     Allopurinol Other (See Comments)     Other reaction(s): Chest Tightness  Gave pt pains in chest like a heart attack  Pain       Social History     Tobacco Use     Smoking status: Former Smoker     Packs/day: 1.50     Years: 15.00     Pack years: 22.50     Types: Cigarettes     Quit date:      Years since quittin.8     Smokeless tobacco: Never Used   Substance Use Topics     Alcohol use: Yes     Alcohol/week: 14.0 standard drinks     Types: 14 Shots of liquor per week     Drug use: Not on file     There is no problem list on file for this patient.       Reviewed, reconciled and updated       Physical Exam   General Appearance: Very pleasant, significantly overweight, good blood pressure, no physical exam today.    /61   Pulse 67   Temp 97.9  F (36.6  C)   Wt (!) 243 lb (110.2 kg)   SpO2 97%   BMI 33.89 kg/m         Additional Information   I spent 25 minutes face time with the patient, with > 50% counseling, explaining and discussing with the patient the issues enumerated in the Assessment and Plan section of this note and answering questions. Afterwards, the patient was given a printout of the AVS, with  attention to the content in the Patient Instruction section.       Tay Neely MD

## 2021-06-12 NOTE — PATIENT INSTRUCTIONS - HE
Poorly controlled type 2 diabetes, has felt well since starting low-dose Ozempic on October 11, 2020, has had 3 injections thus far, will increase to Ozempic 0.5 mg/week, but he needs to make drastic lifestyle changes to get his diabetes under control, namely cutting out alcohol, reducing overall calories, and cutting back on starchy foods sugary foods.  Because of an A1c of 8.4, he started Ozempic on October 11, injecting every Sunday.  He still getting elevated morning blood sugar readings, for example about 180 this morning.  I quizzed him on what he ate for supper last night, and he reported roast beef with ketchup, a small sweet potato but that included brown sugar and butter, and also two scotch and sodas, each of which consists of about a shot and a half of scotch.  I told him he needs to make some big changes.  The added sugar needs to stop.  I reminded him ketchup and many sauces are loaded with sugar.  I would strongly recommend he cut the alcohol out entirely.  That will help his liver enzymes get back to normal.  I told him that his morning blood sugars reflect the spillover of what ever he ate the night before.      He is about to depart for his winter home in [place], leaving on Saturday, October 31, going by automobile, and plans to return to [place] in May 2021.  I am going to give him an after visit summary that contains all his medical issues, and he could show that to a medical professional in [place].    His other diabetes medications are the sulfonylurea glimepiride 2 mg tablet, taken as 2 tablets equals 4 mg every morning with breakfast.  Ozempic will be increasing to 0.5 mg/week.    He should get an A1c checked in about 3 months, meaning approximately January 2021.  He could report that result to us via [employer].  At that blood draw in [place] in January, please also get a comprehensive metabolic panel, blood cell counts, and lipid profile.    Chronic renal insufficiency in the context of  diabetes, GFR approximately 31.  For that reason he no longer takes metformin.  Ozempic does not require dose adjustment for kidney function.  I reminded him that preserving his kidney function for the long-term involves controlling blood pressure and diabetes.  He is on losartan 50 mg twice a day which will help preserve kidney function.    Elevated liver transaminases probably from effects of alcohol and inadequately controlled diabetes.  October 8, 2020 ALT 61, AST 56.  Normal alkaline phosphatase and bilirubin.  In response to the liver test, he reduced his atorvastatin dose from 40 mg down to 20 mg a day.  I told her that he can go back to 40 mg a day, but must cut out the alcohol.  Needs to recheck his metabolic panel including these liver chemistry tests in January 2021 while he is in [place].  The magnitude of the elevation is mild.    Hypertension in the context of renal insufficiency and diabetes.  His antihypertensive medications are losartan 50 mg twice a day, hydrochlorothiazide 12.5 mg once a day, hydralazine 25 mg every 6 hours as needed for blood pressure over 200 systolic, carvedilol 3.125 mg twice a day, and nifedipine extended release 30 mg once a day.  In clinic blood pressure today October 27 looks good at 127/61 which I would consider right at target.  I reminded him to bring his home blood pressure machine with him to [place], and check his blood pressure every day, recording the numbers in a log book.  If he can lose weight, and also cut out the alcohol, that might reduce his dependence on blood pressure medication.  It would be nice to see him no longer need the hydralazine, and maybe consider getting off of the hydrochlorothiazide or the nifedipine.  I anticipate that he will need to be on the losartan and carvedilol long-term.    Gout on fubuxostat in the context of chronic renal insufficiency, on 40 mg once a day.    Hyperlipidemia in the context of coronary disease and diabetes, on high  intensity atorvastatin 40 mg at bedtime.  I told him to resume the 40 mg dose.    Coronary artery disease, no symptoms of angina or heart failure at present.  Had satisfactory cardiac stress test in September 2019.  No history of MI.  2013 angiogram with moderate diffuse disease of the LAD and right coronary arteries, but no stents.  2014 echocardiogram with grade 1 diastolic dysfunction.   Carvedilol is for cardiac protection as well as blood pressure control.  On high intensity lipid-lowering.  Takes a full-size aspirin 325 mg a day.    History of transient ischemic attack 2016, no residual effects.    Suspected obstructive sleep apnea, but this has not yet been formally tested.  Getting rid of the alcohol and losing weight would help reduce the tendency to have sleep apnea.    Obesity in the context of type 2 diabetes, coronary disease, hypertension, body mass index 33.9.  I asked him to set a goal to try to trim off about 10 pounds by the end of 2020, and then another 25 pounds in 2021.  I want him doing moderate physical activity sustained for at least 45 minutes every day.  Walking or strength training would be great.    Colon screening current last colonoscopy September 2015.  Family history colon cancer (mother).    Already received seasonal flu vaccine.

## 2021-06-12 NOTE — TELEPHONE ENCOUNTER
RN cannot approve Refill Request    RN can NOT refill this medication med is not covered by policy/route to provider. Last office visit: 10/8/2020 Dimitri Ma MD Last Physical: Visit date not found Last MTM visit: Visit date not found Last visit same specialty: 10/8/2020 Dimitri Ma MD.  Next visit within 3 mo: Visit date not found  Next physical within 3 mo: Visit date not found      Tatiana Larios, [facility] Triage/Med Refill 10/10/2020    Requested Prescriptions   Pending Prescriptions Disp Refills     nitroglycerin (NITROSTAT) 0.4 MG SL tablet [Pharmacy Med Name: Nitroglycerin 0.4 MG Sublingual Tablet Sublingual]  0     Sig: DISSOLVE ONE TABLET UNDER THE TONGUE EVERY 5 MINUTES AS NEEDED FOR CHEST PAIN.  DO NOT EXCEED A TOTAL OF 3 DOSES IN 15 MINUTES       There is no refill protocol information for this order

## 2021-06-12 NOTE — TELEPHONE ENCOUNTER
Tell patient that his labs showed a high blood sugar and hemoglobin A1c of 8.4% shows diabetes is not controlled.    Have patient start Ozempic 0.25 mg weekly.  This was discussed at his appointment yesterday.  If cost is an issue, have him request an [facility] pharmacy consult to review medication coverage.    Have him set up an appointment in 2 weeks.    Also, tell patient that his liver tests were mildly elevated.  This is likely fatty liver associated with poorly controlled diabetes.    But have patient reduce his atorvastatin temporarily down to 20 mg a day by cutting pill in half.  He should have his liver test rechecked before leaving for [place].  Have him follow-up in clinic in 2 weeks as above.

## 2021-06-12 NOTE — TELEPHONE ENCOUNTER
Patient was already informed of all this, but was just informed by me again. He again expressed understanding of all clinician instructions. He is taking the meds as directed. He has a follow-up with Dr. Neely on 10/27/20.

## 2021-06-12 NOTE — PROGRESS NOTES
[facility] clinic Follow Up Note    Yohan Hayward   79 y.o. male    Date of Visit: 10/8/2020    Chief Complaint   Patient presents with     Follow-up     Diabetic checkup     Subjective  Yohan is here for follow-up on multiple medical issues.    Diabetes type 2 with significant nephropathy with a creatinine of 2.0.    History of severe hypertension with hypertensive encephalopathy in 2016 with a past history of a left parietal and occipital punctate strokes on MRI in 2014.  There was no severe intracranial stenosis or carotid artery stenosis on previous MRA.    He has chronic mild balance issues but no falls.    He has coronary artery disease.  No history of MI.  2013 angiogram with moderate diffuse disease of the LAD and RCA but no stents.  He has a small circumflex with stenosis that is felt to be the cause of his chronic angina.  He has some slight chest pressure when walking up an incline but has not had any chest pain episodes over the past year.    September 2019 nuclear stress test was negative for ischemia.    2014 echo showed grade 1 diastolic dysfunction.  No lower extremity edema or increasing shortness of breath.  He is moderately overweight.    Suspected sleep apnea but he is not wanted evaluation in the past.    No palpitations or history of arrhythmia.    Diabetes has been moderately well controlled with hemoglobin A1c of 7.5% in July.  His blood sugar during the day is usually around 120.  Blood sugar in the morning is often at 180.    He takes Amaryl 4 mg in the morning.  He denies any low blood sugar events.    Creatinine was 2.0 and stable in July.    Patient does report intermittent orthostatic dizziness spells, especially when sitting for a long time and getting up such as in a car.  He is not had a full syncope episode.    He is not used any hydralazine which he has as an as needed medicine for high blood pressures.    He is on Procardia XL 30 mg twice a day, losartan 50 mg  twice a day, carvedilol 3.125 mg twice a day, and HCTZ 12.5 mg every other day.    No recent gout.  He did get the generic Uloric now.  He has an allergy to allopurinol.    Ophthalmology in May and no diabetic retinopathy.    Past history of branch vein occlusion but no longer on Avastin shots.  He is due to see ophthalmology again next May.    He remains active with regular walking.  No foot sores or leg claudication.    No new neurologic events.    Quit smoking in 1981.  No cough or fever.    September 2015 colonoscopy was negative and no further planned with age.  His mother did have colon cancer, however.        PMHx:  No past medical history on file.  PSHx:    Past Surgical History:   Procedure Laterality Date     ESOPHAGOGASTRODUODENOSCOPY  05/05/2011     Immunizations:   Immunization History   Administered Date(s) Administered     Influenza Whole 10/16/2013     Influenza high dose,seasonal,PF, 65+ yrs 09/14/2015, 10/27/2016, 09/04/2018, 10/08/2019     Influenza, Seasonal, Inj PF IIV3 10/10/2009     Influenza,seasonal, Inj IIV3 11/10/2011     Influenza,trivalent,im, 65+yrs 10/13/2017     Pneumo Conj 13-V (2010&after) 07/23/2015     Pneumo Polysac 23-V 10/31/2012     Tdap 10/31/2012       ROS A comprehensive review of systems was performed and was otherwise negative    Medications, allergies, and problem list were reviewed and updated    Exam blood pressure was 150/70 on my recheck  /64 (Patient Site: Right Arm, Patient Position: Sitting, Cuff Size: Adult Large)   Pulse 72   Temp 96.9  F (36.1  C) (Other) Comment (Src): forehead  Wt (!) 245 lb (111.1 kg)   BMI 34.17 kg/m    Lungs are clear to auscultation.  Heart is regular without murmur.  Abdomen is mildly overweight but nontender.  No hepatosplenomegaly.  No epigastric tenderness.  No ankle edema.  Feet without sores.    Assessment/Plan  1. Type 2 diabetes mellitus with diabetic nephropathy, without long-term current use of insulin (H)  Borderline  controlled.  Not taking Metformin with his renal insufficiency.    Amaryl 4 mg in the morning and denies low blood sugar.    I did discuss Ozempic with patient today.  Patient would be open to starting Ozempic.  No history of thyroid cancer or pancreatitis.    Patient is leaving for [place] next month, however and will not be back until May.    If hemoglobin A1c significantly above 8%, he could consider Ozempic before he leaves for [place].  He may need an [facility] pharmacy consult today with insurance issues, however.  - Glycosylated Hemoglobin A1c    Yearly eye exam in May.    No foot sores.    I discussed importance of daily walking and diabetic diet.    2. Hypercholesterolemia  Lipitor 40 mg.  In July LDL was 74 and HDL 35.    3. Need for immunization against influenza  Given today  - Influenza,Quad,High Dose,PF 65 YR+    4. Essential hypertension, benign  Initially on the low side and he was reporting some intermittent orthostatic symptoms.    But on my blood pressure recheck by me was 150/70.    He does have a labile hypertension condition.    With his renal insufficiency and suspected sleep apnea and history of hypertensive encephalopathy.    Continue current blood pressure medications at this time.    See patient instructions, if his blood pressure is running lower and worsening lightheaded dizzy spells, he could try reducing the nifedipine to once a day, but with risks of high spiking blood pressure and even recurrent stroke with that discussed with patient today.    5. Chronic renal insufficiency, stage 3 (moderate)  Has been stable.  Recheck kidney labs today consistent with hypertension and diabetic nephrosclerosis.    6. Coronary artery disease due to lipid rich plaque  Asymptomatic.  Does have a history of exertional stable angina but not recent.  Does have nitroglycerin as needed.    He did not get a chance to follow-up with cardiology this year but patient stable.    Continue Lipitor and aspirin daily.   Patient was told if any worsening chest pain or escalating symptoms to get evaluated right away.    7. Encounter for therapeutic drug monitoring    - Comprehensive Metabolic Panel    History of gout, no recurrence on Uloric    Return in about 7 months (around 5/8/2021) for Recheck.   Patient Instructions   Continue to work on diet and exercise for diabetes.  Contact me if you have any low blood sugars less than 80.    You may consider adding Ozempic medication weekly to control diabetes, if your hemoglobin A1c gets above 8%.    Follow-up with me in May when you return to [place].    If your blood pressure is running less than 110/60 or having increasing lightheaded dizzy spells, reduce your nifedipine to 30 mg once a day.  But, goal blood pressure less than 135/85.  Make sure your blood pressure remains well controlled, if you do reduce the nifedipine dose.        Dimitri Ma MD        Current Outpatient Medications   Medication Sig Dispense Refill     aspirin 325 MG tablet Take 1 tablet by mouth daily.       atorvastatin (LIPITOR) 40 MG tablet Take 1 tablet (40 mg total) by mouth at bedtime. 90 tablet 2     azelastine (ASTELIN) 137 mcg (0.1 %) nasal spray 2 sprays in each nostril up to 3 times a day as needed for runny nose. 30 mL 12     blood glucose test (GLUCOSE BLOOD) strips USE TO TEST BLOOD SUGAR 1-2 TIMES DAILY       carvedilol (COREG) 3.125 MG tablet Take 1 tablet (3.125 mg total) by mouth 2 (two) times a day with meals. 180 tablet 2     febuxostat (ULORIC) 40 mg tablet Take 1 tablet (40 mg total) by mouth daily. 90 tablet 3     glimepiride (AMARYL) 2 MG tablet Take 2 tablets (4 mg) by mouth every morning with meal. 180 tablet 3     hydrALAZINE (APRESOLINE) 25 MG tablet Take 1 Tab by mouth every six hours as needed (for blood pressure over 200 systolic.).       hydroCHLOROthiazide (MICROZIDE) 12.5 mg capsule Take 1 capsule (12.5 mg) by mouth every 48 hours. 45 capsule 3     losartan (COZAAR) 50 MG  tablet Take 1 tablet (50 mg total) by mouth 2 (two) times a day. 180 tablet 3     NIFEdipine (PROCARDIA XL) 30 MG 24 hr tablet TAKE 1 TABLET BY MOUTH TWICE DAILY. SWALLOW WHOLE. DO NOT CRUSH OR CHEW. 180 tablet 3     nitroglycerin (NITROSTAT) 0.4 MG SL tablet Place 1 tablet (0.4 mg total) under the tongue every 5 (five) minutes as needed for chest pain. 25 tablet 3     No current facility-administered medications for this visit.      Allergies   Allergen Reactions     Allopurinol Other (See Comments)     Other reaction(s): Chest Tightness  Gave pt pains in chest like a heart attack  Pain       Social History     Tobacco Use     Smoking status: Former Smoker     Packs/day: 1.50     Years: 15.00     Pack years: 22.50     Types: Cigarettes     Quit date:      Years since quittin.7     Smokeless tobacco: Never Used   Substance Use Topics     Alcohol use: Yes     Alcohol/week: 14.0 standard drinks     Types: 14 Shots of liquor per week     Drug use: Not on file

## 2021-06-12 NOTE — TELEPHONE ENCOUNTER
Patient Returning Call  Reason for call: calling back per message left for patient, note stated patient should schedule a 2 week follow-up   Information relayed to patient: Dr Ma does not have an appt in the 2 week time frame and a message would be left for the clinic to call him back   Patient has additional questions:  Double checking if rx was sent to [facility], I confirmed with patient it was.   If YES, what are your questions/concerns:  Patient would like a call back from someone that is able to schedule the appt in the 2 week time frame requested by Dr Anil Monique to leave a detailed message?: yes

## 2021-06-12 NOTE — PATIENT INSTRUCTIONS - HE
Continue to work on diet and exercise for diabetes.  Contact me if you have any low blood sugars less than 80.    You may consider adding Ozempic medication weekly to control diabetes, if your hemoglobin A1c gets above 8%.    Follow-up with me in May when you return to [place].    If your blood pressure is running less than 110/60 or having increasing lightheaded dizzy spells, reduce your nifedipine to 30 mg once a day.  But, goal blood pressure less than 135/85.  Make sure your blood pressure remains well controlled, if you do reduce the nifedipine dose.

## 2021-06-12 NOTE — TELEPHONE ENCOUNTER
Left voicemail for patient to return call to clinic. When patient returns call, please give them below message.    Marlene Lao CMA ............... 9:37 AM, 10/09/20

## 2021-06-12 NOTE — TELEPHONE ENCOUNTER
Have him set up appointment with available provider.  He may need to see another provider if I do not have available appointments.  This is for diabetes management and initiation of Ozempic medication.

## 2021-06-12 NOTE — TELEPHONE ENCOUNTER
Patient Returning Call  Reason for call:  Per message below  Information relayed to patient:  Per message below  Patient has additional questions:  No  If YES, what are your questions/concerns:  N/A  Okay to leave a detailed message?: No call back needed

## 2021-06-14 NOTE — TELEPHONE ENCOUNTER
Refill request for medication: Atorvastatin 40 mg  Last visit addressing this medication: 10/27/2020  Follow up plan 6  months  Last refill on 1/7/2020, quantity #90   CSA completed Not applicable   checked Not applicable    Appointment: Appointment scheduled for 5/13/2021     Marlene Lao CMA

## 2021-06-16 PROBLEM — E66.09 CLASS 1 OBESITY DUE TO EXCESS CALORIES WITH SERIOUS COMORBIDITY AND BODY MASS INDEX (BMI) OF 34.0 TO 34.9 IN ADULT: Status: ACTIVE | Noted: 2020-10-27

## 2021-06-16 PROBLEM — E66.811 CLASS 1 OBESITY DUE TO EXCESS CALORIES WITH SERIOUS COMORBIDITY AND BODY MASS INDEX (BMI) OF 34.0 TO 34.9 IN ADULT: Status: ACTIVE | Noted: 2020-10-27

## 2021-06-16 NOTE — TELEPHONE ENCOUNTER
Refill Approved    Rx renewed per Medication Renewal Policy. Medication was last renewed on 1/21/21.    Sreekanth Flowers, [facility] Triage/Med Refill 4/19/2021     Requested Prescriptions   Pending Prescriptions Disp Refills     atorvastatin (LIPITOR) 40 MG tablet [Pharmacy Med Name: Atorvastatin Calcium 40 MG Oral Tablet] 90 tablet 0     Sig: TAKE 1 TABLET BY MOUTH AT BEDTIME       Statins Refill Protocol (Hmg CoA Reductase Inhibitors) Passed - 4/18/2021  2:24 PM        Passed - PCP or prescribing provider visit in past 12 months      Last office visit with prescriber/PCP: 10/8/2020 Dimitri Ma MD OR same dept: 10/27/2020 Tay Neely MD OR same specialty: 10/27/2020 Tay Neely MD  Last physical: Visit date not found Last MTM visit: Visit date not found   Next visit within 3 mo: Visit date not found  Next physical within 3 mo: Visit date not found  Prescriber OR PCP: Dimitri Ma MD  Last diagnosis associated with med order: 1. Hypercholesterolemia  - atorvastatin (LIPITOR) 40 MG tablet [Pharmacy Med Name: Atorvastatin Calcium 40 MG Oral Tablet]; TAKE 1 TABLET BY MOUTH AT BEDTIME  Dispense: 90 tablet; Refill: 0    If protocol passes may refill for 12 months if within 3 months of last provider visit (or a total of 15 months).

## 2021-06-17 NOTE — PROGRESS NOTES
[facility] Follow [facility]    Yohan Hayward   80 y.o. male    Date of Visit: 5/13/2021    Chief Complaint   Patient presents with     Follow-up     Subjective  Yohan is an 80-year-old male here for follow-up of multiple medical problems.    Coronary disease but no history of MI.  2013 angiogram with moderate diffuse disease of the LAD and RCA but no stent history.  2019 - stress test.  He has not had recent angina.  He is walking on a nearly daily basis.  No leg claudication.    Quit smoking in 1981.  No new cough.  2014 with just grade 1 diastolic dysfunction.  No palpitations or history of A. fib.    Suspected sleep apnea but is not wanted evaluation.  He denies significant daytime sleepiness.    History of severe hypertension and hypertensive encephalopathy in 2016.  Left parietal and occipital punctate strokes in 2014 MRI.  No new neurologic events.  No headache complaints.    His blood pressure has been well controlled recently.  Blood pressure has been running low last winter.  He is no longer on HCTZ 12.5 mg every other day.    His blood pressure recently has been running in the 130s over 70s.  Still some mild orthostasis but not as severe as previous.    On carvedilol 3.125 mg twice daily, losartan 50 mg twice daily and Procardia XL 30 mg twice daily.    No significant edema.    Previous MR a without severe intracranial stenosis or carotid artery stenosis in 2014.    He has some mild chronic leg achiness but not claudication.  That has improved after reducing alcohol, he was previously on 3 ounces of scotch a day, trying to limit to 1 now.    He is back on 40 mg a day atorvastatin.  He had some mild elevated liver tests in October.  No right upper quadrant pain or epigastric pain now.    He did initiate Ozempic 0.5 mg last fall.  No history of pancreatitis.  No abdominal pain or loose stools.  Still on glimepiride 4 mg in the morning.  He denies any low blood sugar events.    His blood  sugars have been significantly better running 100-1 40.  Hemoglobin A1c was down in the sixes in [place].  Hemoglobin A1c was 8.4% last October before starting Ozempic.    May 2020 eye exam negative retinopathy.  No new vision changes.  No foot sores.    He does have chronic renal insufficiency with a creatinine of 2.0 last October.    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  He did have a fall with hypotension in [place] this past December.  No recent falls.    No recent gout on Uloric/allopurinol allergy        PMHx:  No past medical history on file.  PSHx:    Past Surgical History:   Procedure Laterality Date     ESOPHAGOGASTRODUODENOSCOPY  05/05/2011     Immunizations:   Immunization History   Administered Date(s) Administered     COVID-19,PF,Pfizer 01/19/2021, 02/09/2021     Influenza Whole 10/16/2013     Influenza high dose,seasonal,PF, 65+ yrs 09/14/2015, 10/27/2016, 09/04/2018, 10/08/2019     Influenza, Seasonal, Inj PF IIV3 10/10/2009     Influenza,quad,high Dose,PF, 65yr + 10/08/2020     Influenza,seasonal, Inj IIV3 11/10/2011     Influenza,trivalent,im, 65+yrs 10/13/2017     Pneumo Conj 13-V (2010&after) 07/23/2015     Pneumo Polysac 23-V 10/31/2012     Tdap 10/31/2012       ROS A comprehensive review of systems was performed and was otherwise negative    Medications, allergies, and problem list were reviewed and updated    Exam  /52   Pulse 68   Wt (!) 238 lb (108 kg)   BMI 33.19 kg/m    Alert and oriented.  No jaundice.  Lungs are clear.  No carotid bruits.  Heart is regular without murmur.  Abdomen is mildly overweight.  No epigastric pain or right upper quadrant tenderness.  Just trace ankle edema bilaterally.    Assessment/Plan  1. Essential hypertension, benign  Well-controlled, on the low side.  If elevated creatinine above baseline, I will have him reduce the losartan down to 50 mg once a day and follow-up next month for evaluation.    Otherwise continue on  current medications but he was warned to contact me if lower blood pressures or increasing orthostasis symptoms.    Stay off HCTZ      - NIFEdipine (PROCARDIA XL) 30 MG 24 hr tablet; TAKE 1 TABLET BY MOUTH TWICE DAILY. SWALLOW WHOLE. DO NOT CRUSH OR CHEW.  Dispense: 180 tablet; Refill: 3    2. Chronic renal insufficiency, stage 3 (moderate)  As above    3. Type 2 diabetes mellitus without complication, without long-term current use of insulin (H)  Well-controlled now on Ozempic.  Tolerating well.  Could consider increase of Ozempic in the future with goal for further weight loss.  Suspected sleep apnea.    He denies any low blood sugars with Amaryl, but he was told to contact me if any low blood sugars to reduce that dose.  - glimepiride (AMARYL) 2 MG tablet; Take 2 tablets (4 mg) by mouth every morning with meal.  Dispense: 180 tablet; Refill: 3  - Glycosylated Hemoglobin A1c    He was reminded to make a yearly eye exam which she is due for.    No foot sores  4. History of stroke  No new event.  Associated with hypertensive crisis, likely associate with sleep apnea and alcohol in the past.    Aspirin daily    5. Coronary artery disease due to calcified coronary lesion  Asymptomatic.  Aspirin and Lipitor.    6. Hypercholesterolemia  As above  - Lipid Cascade    7. Encounter for therapeutic drug monitoring    - Comprehensive Metabolic Panel  - CK Total    He completed the COVID-19 vaccine    His mother had colon cancer.  Last colonoscopy in 2015 - and not planning further colonoscopy with age    Return in about 3 months (around 8/13/2021) for Recheck.   Patient Instructions   If you begin having increased lightheaded dizzy spells or blood pressure less than 120/60, contact me and I would have you reduce the losartan down to 50 mg once a day.    If any low blood sugars less than 80 or low blood sugar type reactions, contact me and I would have you reduce the glimepiride down to 2 mg a day.    Continue to walk on a  daily basis.    See ophthalmology for a diabetic eye check this spring or summer.    Avoid alcohol or limit to 1 ounce or less a day.    Follow-up in 3 months, you do not need to fast for that appointment        Dimitri Ma MD        Current Outpatient Medications   Medication Sig Dispense Refill     aspirin 325 MG tablet Take 1 tablet by mouth daily.       atorvastatin (LIPITOR) 40 MG tablet TAKE 1 TABLET BY MOUTH AT BEDTIME 90 tablet 2     azelastine (ASTELIN) 137 mcg (0.1 %) nasal spray 2 sprays in each nostril up to 3 times a day as needed for runny nose. 30 mL 12     blood glucose test (GLUCOSE BLOOD) strips USE TO TEST BLOOD SUGAR 1-2 TIMES DAILY       carvedilol (COREG) 3.125 MG tablet Take 1 tablet (3.125 mg total) by mouth 2 (two) times a day with meals. 180 tablet 2     febuxostat (ULORIC) 40 mg tablet Take 1 tablet (40 mg total) by mouth daily. 90 tablet 3     losartan (COZAAR) 50 MG tablet Take 1 tablet (50 mg total) by mouth 2 (two) times a day. 180 tablet 3     nitroglycerin (NITROSTAT) 0.4 MG SL tablet DISSOLVE ONE TABLET UNDER THE TONGUE EVERY 5 MINUTES AS NEEDED FOR CHEST PAIN.  DO NOT EXCEED A TOTAL OF 3 DOSES IN 15 MINUTES 25 tablet 2     semaglutide (OZEMPIC) 0.25 mg or 0.5 mg(2 mg/1.5 mL) PnIj Inject 0.5 mg under the skin once a week. 3 Syringe 3     glimepiride (AMARYL) 2 MG tablet Take 2 tablets (4 mg) by mouth every morning with meal. 180 tablet 3     NIFEdipine (PROCARDIA XL) 30 MG 24 hr tablet TAKE 1 TABLET BY MOUTH TWICE DAILY. SWALLOW WHOLE. DO NOT CRUSH OR CHEW. 180 tablet 3     No current facility-administered medications for this visit.      Allergies   Allergen Reactions     Allopurinol Other (See Comments)     Other reaction(s): Chest Tightness  Gave pt pains in chest like a heart attack  Pain       Social History     Tobacco Use     Smoking status: Former Smoker     Packs/day: 1.50     Years: 15.00     Pack years: 22.50     Types: Cigarettes     Quit date: 1981     Years since  quittin.3     Smokeless tobacco: Never Used   Substance Use Topics     Alcohol use: Yes     Alcohol/week: 14.0 standard drinks     Types: 14 Shots of liquor per week     Drug use: Not on file

## 2021-06-17 NOTE — TELEPHONE ENCOUNTER
Telephone Encounter by Virgen Medina at 7/15/2020 10:09 AM     Author: Virgen Medina Service: -- Author Type: --    Filed: 7/15/2020 10:10 AM Encounter Date: 7/14/2020 Status: Signed    : Virgen Medina APPROVED:    Approval start date: 04/16/2020  Approval end date:  07/15/2021    Pharmacy has been notified of approval and will contact patient when medication is ready for pickup.

## 2021-06-17 NOTE — TELEPHONE ENCOUNTER
Mr Yesy dropped off a copy of his Covid vaccinaton for his records. He does not need a phone call or this sent back to him.

## 2021-06-17 NOTE — TELEPHONE ENCOUNTER
Refill Request  Medication name:   Requested Prescriptions     Pending Prescriptions Disp Refills     losartan (COZAAR) 50 MG tablet 180 tablet 3     Sig: Take 1 tablet (50 mg total) by mouth 2 (two) times a day.     Who prescribed the medication: Anil  Last refill on medication: 12/12/20  Requested Pharmacy: [facility]  Last appointment with PCP: 05/13/21  Next appointment: Appointment scheduled for 08/19/21    RT Luke (R)

## 2021-06-17 NOTE — PATIENT INSTRUCTIONS - HE
If you begin having increased lightheaded dizzy spells or blood pressure less than 120/60, contact me and I would have you reduce the losartan down to 50 mg once a day.    If any low blood sugars less than 80 or low blood sugar type reactions, contact me and I would have you reduce the glimepiride down to 2 mg a day.    Continue to walk on a daily basis.    See ophthalmology for a diabetic eye check this spring or summer.    Avoid alcohol or limit to 1 ounce or less a day.    Follow-up in 3 months, you do not need to fast for that appointment

## 2021-06-17 NOTE — TELEPHONE ENCOUNTER
Telephone Encounter by Dimitri Ma MD at 5/7/2021  6:28 AM     Author: Dimitri Ma MD Service: -- Author Type: Physician    Filed: 5/7/2021  6:28 AM Encounter Date: 5/7/2021 Status: Signed    : Dimitri Ma MD (Physician)    Odilia Daniels MD 5/6/2021  5:34 PM CDT    Please update immunization history.  I sent thousands of messages to help   with vaccine efforts and can't update them all myself.  Thanks.     Odilia Daniels MD       ----- Message -----  From: Yohan Hayward  Sent: 4/23/2021   4:16 PM CDT  To: Odilia Daniels MD  Subject: RE:schedule your covid vaccine appointment       I have already had the Covid shot.

## 2021-06-17 NOTE — TELEPHONE ENCOUNTER
Telephone Encounter by Virgen Medina at 7/28/2020  8:24 AM     Author: Virgen Medina Service: -- Author Type: --    Filed: 7/28/2020  8:26 AM Encounter Date: 7/27/2020 Status: Signed    : Virgen Medina       TIER EXCEPTION DENIAL     DENIAL RATIONALE: Medication is not eligible for a tier exception.

## 2021-06-19 NOTE — LETTER
Letter by Dimitri Ma MD at      Author: Dimitri Ma MD Service: -- Author Type: --    Filed:  Encounter Date: 9/5/2019 Status: (Other)         Yohan Hayward  [address]  [facility]  [place] [address]             September 5, 2019         Dear Mr. Hayward,    Below are the results from your recent visit:    Resulted Orders   Comprehensive Metabolic Panel   Result Value Ref Range    Sodium 143 136 - 145 mmol/L    Potassium 4.9 3.5 - 5.0 mmol/L    Chloride 107 98 - 107 mmol/L    CO2 26 22 - 31 mmol/L    Anion Gap, Calculation 10 5 - 18 mmol/L    Glucose 155 (H) 70 - 125 mg/dL    BUN 29 (H) 8 - 28 mg/dL    Creatinine 2.00 (H) 0.70 - 1.30 mg/dL    GFR MDRD Af Amer 39 (L) >60 mL/min/1.73m2    GFR MDRD Non Af Amer 32 (L) >60 mL/min/1.73m2    Bilirubin, Total 0.5 0.0 - 1.0 mg/dL    Calcium 9.6 8.5 - 10.5 mg/dL    Protein, Total 7.0 6.0 - 8.0 g/dL    Albumin 4.1 3.5 - 5.0 g/dL    Alkaline Phosphatase 72 45 - 120 U/L    AST 22 0 - 40 U/L    ALT 27 0 - 45 U/L    Narrative    Fasting Glucose reference range is 70-99 mg/dL per  American Diabetes Association (ADA) guidelines.   Glycosylated Hemoglobin A1c   Result Value Ref Range    Hemoglobin A1c 7.2 (H) 3.5 - 6.0 %   LDL Cholesterol, Direct   Result Value Ref Range    Direct  <=129 mg/dl   Uric Acid   Result Value Ref Range    Uric Acid 6.3 3.0 - 8.0 mg/dL       Creatinine level is stable from previous.  Potassium level is normal.    Liver tests are normal.    Diabetes is adequately controlled with a hemoglobin A1c of 7.2%.    LDL cholesterol level is higher than goal for somebody with coronary artery disease.  We will discuss increasing atorvastatin at future visit.    Uric acid level is normal.  Continue current Uloric.      Please call with questions or contact us using [employer].    Sincerely,        Electronically signed by Dimitri Ma MD

## 2021-06-20 ENCOUNTER — HEALTH MAINTENANCE LETTER (OUTPATIENT)
Age: 81
End: 2021-06-20

## 2021-06-20 NOTE — LETTER
Letter by Dimitri Ma MD at      Author: Dimitri Ma MD Service: -- Author Type: --    Filed:  Encounter Date: 7/7/2020 Status: (Other)         Yohan Hayward  [address]  [facility]  [place] [address]             July 7, 2020         Dear Mr. Hayward,    Below are the results from your recent visit:    Resulted Orders   Comprehensive Metabolic Panel   Result Value Ref Range    Sodium 140 136 - 145 mmol/L    Potassium 4.4 3.5 - 5.0 mmol/L    Chloride 105 98 - 107 mmol/L    CO2 21 (L) 22 - 31 mmol/L    Anion Gap, Calculation 14 5 - 18 mmol/L    Glucose 151 (H) 70 - 125 mg/dL    BUN 34 (H) 8 - 28 mg/dL    Creatinine 2.06 (H) 0.70 - 1.30 mg/dL    GFR MDRD Af Amer 38 (L) >60 mL/min/1.73m2    GFR MDRD Non Af Amer 31 (L) >60 mL/min/1.73m2    Bilirubin, Total 0.6 0.0 - 1.0 mg/dL    Calcium 9.6 8.5 - 10.5 mg/dL    Protein, Total 7.0 6.0 - 8.0 g/dL    Albumin 4.1 3.5 - 5.0 g/dL    Alkaline Phosphatase 69 45 - 120 U/L    AST 35 0 - 40 U/L    ALT 48 (H) 0 - 45 U/L    Narrative    Fasting Glucose reference range is 70-99 mg/dL per  American Diabetes Association (ADA) guidelines.   Glycosylated Hemoglobin A1c   Result Value Ref Range    Hemoglobin A1c 7.5 (H) 3.5 - 6.0 %   Lipid Cascade   Result Value Ref Range    Cholesterol 155 <=199 mg/dL    Triglycerides 230 (H) <=149 mg/dL    HDL Cholesterol 35 (L) >=40 mg/dL    LDL Calculated 74 <=129 mg/dL    Patient Fasting > 8hrs? Yes        Creatinine shows stable kidney function.  Potassium level is normal.    Borderline elevation of ALT liver test.    Hemoglobin A1c of 7.5% shows adequate control of diabetes.    Continued elevation of triglycerides and low HDL.  LDL cholesterol level adequately controlled.  Continue current dose of atorvastatin.    No change in treatment plan.    Please call with questions or contact us using [employer].    Sincerely,        Electronically signed by Dimitri Ma MD

## 2021-06-21 ENCOUNTER — RECORDS - HEALTHEAST (OUTPATIENT)
Dept: ADMINISTRATIVE | Facility: OTHER | Age: 81
End: 2021-06-21

## 2021-06-21 LAB — RETINOPATHY: NEGATIVE

## 2021-06-21 NOTE — LETTER
Letter by Dimitri Ma MD at      Author: Dimitri Ma MD Service: -- Author Type: --    Filed:  Encounter Date: 5/13/2021 Status: (Other)         Yohan Hayward  [address] [facility] [place] [address]             May 13, 2021         Dear Mr. Hayward,    Below are the results from your recent visit:    Resulted Orders   Comprehensive Metabolic Panel   Result Value Ref Range    Sodium 142 136 - 145 mmol/L    Potassium 4.6 3.5 - 5.0 mmol/L    Chloride 106 98 - 107 mmol/L    CO2 25 22 - 31 mmol/L    Anion Gap, Calculation 11 5 - 18 mmol/L    Glucose 158 (H) 70 - 125 mg/dL    BUN 28 8 - 28 mg/dL    Creatinine 2.11 (H) 0.70 - 1.30 mg/dL    GFR MDRD Af Amer 37 (L) >60 mL/min/1.73m2    GFR MDRD Non Af Amer 30 (L) >60 mL/min/1.73m2    Bilirubin, Total 0.5 0.0 - 1.0 mg/dL    Calcium 9.4 8.5 - 10.5 mg/dL    Protein, Total 6.9 6.0 - 8.0 g/dL    Albumin 4.0 3.5 - 5.0 g/dL    Alkaline Phosphatase 68 45 - 120 U/L    AST 18 0 - 40 U/L    ALT 23 0 - 45 U/L    Narrative    Fasting Glucose reference range is 70-99 mg/dL per  American Diabetes Association (ADA) guidelines.   Lipid Cascade   Result Value Ref Range    Cholesterol 150 <=199 mg/dL    Triglycerides 185 (H) <=149 mg/dL    HDL Cholesterol 38 (L) >=40 mg/dL    LDL Calculated 75 <=129 mg/dL    Patient Fasting > 8hrs? Yes    CK Total   Result Value Ref Range    CK, Total 68 30 - 190 U/L   Glycosylated Hemoglobin A1c   Result Value Ref Range    Hemoglobin A1c 6.5 (H) <=5.6 %       CK, muscle test, is normal.  Liver tests are normal.  Excellent LDL cholesterol control.  Continue on current atorvastatin.    Excellent control of diabetes with hemoglobin A1c of 6.5%.  This is a significant improvement in your diabetes control compared to last year.  Continue current diabetes medication.    Your creatinine level is within your baseline range.  Kidney function is stable.  Continue on current blood pressure medications.  Recheck in 3 months as planned.    Please  call with questions or contact us using [employer].    Sincerely,        Electronically signed by Dimitri Ma MD

## 2021-06-23 VITALS
DIASTOLIC BLOOD PRESSURE: 70 MMHG | WEIGHT: 245 LBS | SYSTOLIC BLOOD PRESSURE: 150 MMHG | TEMPERATURE: 96.9 F | BODY MASS INDEX: 34.17 KG/M2 | HEART RATE: 72 BPM

## 2021-06-24 ENCOUNTER — RECORDS - HEALTHEAST (OUTPATIENT)
Dept: HEALTH INFORMATION MANAGEMENT | Facility: CLINIC | Age: 81
End: 2021-06-24

## 2021-08-19 ENCOUNTER — TELEPHONE (OUTPATIENT)
Dept: INTERNAL MEDICINE | Facility: CLINIC | Age: 81
End: 2021-08-19

## 2021-08-19 ENCOUNTER — OFFICE VISIT (OUTPATIENT)
Dept: INTERNAL MEDICINE | Facility: CLINIC | Age: 81
End: 2021-08-19
Payer: MEDICARE

## 2021-08-19 VITALS
SYSTOLIC BLOOD PRESSURE: 136 MMHG | BODY MASS INDEX: 33.89 KG/M2 | WEIGHT: 243 LBS | DIASTOLIC BLOOD PRESSURE: 46 MMHG | HEART RATE: 72 BPM

## 2021-08-19 DIAGNOSIS — R53.83 FATIGUE, UNSPECIFIED TYPE: ICD-10-CM

## 2021-08-19 DIAGNOSIS — I10 ESSENTIAL HYPERTENSION: Primary | ICD-10-CM

## 2021-08-19 DIAGNOSIS — I25.10 CORONARY ARTERY DISEASE DUE TO CALCIFIED CORONARY LESION: ICD-10-CM

## 2021-08-19 DIAGNOSIS — R26.81 UNSTEADY GAIT: ICD-10-CM

## 2021-08-19 DIAGNOSIS — Z51.81 ENCOUNTER FOR THERAPEUTIC DRUG MONITORING: ICD-10-CM

## 2021-08-19 DIAGNOSIS — I25.84 CORONARY ARTERY DISEASE DUE TO CALCIFIED CORONARY LESION: ICD-10-CM

## 2021-08-19 DIAGNOSIS — G62.9 PERIPHERAL POLYNEUROPATHY: ICD-10-CM

## 2021-08-19 DIAGNOSIS — E11.9 TYPE 2 DIABETES MELLITUS WITHOUT COMPLICATION, WITHOUT LONG-TERM CURRENT USE OF INSULIN (H): Primary | ICD-10-CM

## 2021-08-19 DIAGNOSIS — N18.32 CHRONIC RENAL IMPAIRMENT, STAGE 3B (H): ICD-10-CM

## 2021-08-19 LAB
ALBUMIN SERPL-MCNC: 4 G/DL (ref 3.5–5)
ALP SERPL-CCNC: 65 U/L (ref 45–120)
ALT SERPL W P-5'-P-CCNC: 22 U/L (ref 0–45)
ANION GAP SERPL CALCULATED.3IONS-SCNC: 10 MMOL/L (ref 5–18)
AST SERPL W P-5'-P-CCNC: 17 U/L (ref 0–40)
BILIRUB SERPL-MCNC: 0.5 MG/DL (ref 0–1)
BUN SERPL-MCNC: 29 MG/DL (ref 8–28)
CALCIUM SERPL-MCNC: 9.5 MG/DL (ref 8.5–10.5)
CHLORIDE BLD-SCNC: 108 MMOL/L (ref 98–107)
CK SERPL-CCNC: 75 U/L (ref 30–190)
CO2 SERPL-SCNC: 25 MMOL/L (ref 22–31)
CREAT SERPL-MCNC: 2.07 MG/DL (ref 0.7–1.3)
GFR SERPL CREATININE-BSD FRML MDRD: 29 ML/MIN/1.73M2
GLUCOSE BLD-MCNC: 155 MG/DL (ref 70–125)
POTASSIUM BLD-SCNC: 4.7 MMOL/L (ref 3.5–5)
PROT SERPL-MCNC: 6.7 G/DL (ref 6–8)
SODIUM SERPL-SCNC: 143 MMOL/L (ref 136–145)
TSH SERPL DL<=0.005 MIU/L-ACNC: 3.14 UIU/ML (ref 0.3–5)
VIT B12 SERPL-MCNC: 573 PG/ML (ref 213–816)

## 2021-08-19 PROCEDURE — 80053 COMPREHEN METABOLIC PANEL: CPT | Performed by: INTERNAL MEDICINE

## 2021-08-19 PROCEDURE — 82607 VITAMIN B-12: CPT | Performed by: INTERNAL MEDICINE

## 2021-08-19 PROCEDURE — 82550 ASSAY OF CK (CPK): CPT | Performed by: INTERNAL MEDICINE

## 2021-08-19 PROCEDURE — 84443 ASSAY THYROID STIM HORMONE: CPT | Performed by: INTERNAL MEDICINE

## 2021-08-19 PROCEDURE — 36415 COLL VENOUS BLD VENIPUNCTURE: CPT | Performed by: INTERNAL MEDICINE

## 2021-08-19 PROCEDURE — 99214 OFFICE O/P EST MOD 30 MIN: CPT | Performed by: INTERNAL MEDICINE

## 2021-08-19 NOTE — TELEPHONE ENCOUNTER
The patient was informed of the clinician message and verbalized understanding. Lab appt scheduled for tomorrow.

## 2021-08-19 NOTE — TELEPHONE ENCOUNTER
Contact patient.  Unfortunately a hemoglobin A1c was not drawn at his clinic visit as planned.,  I apologize for the inconvenience but I would recommend that patient return to get a hemoglobin A1c level drawn for monitoring of his diabetes.    His other blood work was okay was stable kidney labs, and other labs normal.    Blood sugar on day of visit was 155.

## 2021-08-19 NOTE — PROGRESS NOTES
[facility] clinic Follow Up Note    Yohan Hayward   80 year old male    Date of Visit: 8/19/2021    Chief Complaint   Patient presents with     Follow Up     3 month checkup     Subjective  Yohan is an 80-year-old male here for follow-up of diabetes and other medical issues.    He has a history of severe hypertension with hypertensive encephalopathy in 2016 with a left parietal and occipital punctate strokes in 2014.    Suspected sleep apnea but he has not wanted evaluation.  Previous 3 ounces of scotch daily but has reduced some but still drinking.    He has chronic ataxia with history of falls.  He has numbness on his feet consistent with peripheral neuropathy but no sores.    His blood pressure at home has been running 135/60-70.  He denies significant orthostatic type dizziness.    On Procardia XL 30 mg twice a day, losartan 50 mg 3 times daily and carvedilol 3.125 mg twice daily.    He does have significant renal insufficiency with a creatinine of 2.1 that was stable in May of this year.    Coronary disease no history of MI.  Angiogram in 2013 with moderate diffuse disease of the LAD and RCA but no stent.  2019 - stress test.    Patient does report an exertional mild angina syndrome when walking uphill after eating.  He does take occasional nitroglycerin that situation but he states that been stable for years and has not escalated.  No rest pain.    No persistent epigastric pain.  No blood in stool or history of bleeding.  On daily aspirin.    History of mild elevation of liver test in October, felt to be associate with alcohol use at that time.    Still on Lipitor 40 mg.  He denies any new generalized myalgias.    Quit smoking in 1981.  No new cough, mouth sores or swallowing difficulty.    Diabetes type 2 has been fairly well controlled with hemoglobin A1c of 6.5% in May.  He denies any low blood sugar events.  Blood sugars in the morning usually running in the 140s.  On Ozempic 0.5 mg weekly  and Amaryl 4 mg in the morning.    I did review the June 2021 eye exam that was negative for retinopathy but did show prior branch vein occlusion with neovascularization and short-term follow-up was recommended.  No acute vision changes currently.    Patient has occasional palpitation but no history of atrial fibrillation.    MRA in 2014 did not show severe intracranial stenosis or carotid artery stenosis.    No recent gout events.    He is going to [place] for the winter    PMHx:  No past medical history on file.  PSHx:    Past Surgical History:   Procedure Laterality Date     ESOPHAGOSCOPY, GASTROSCOPY, DUODENOSCOPY (EGD), COMBINED  05/05/2011     Immunizations:   Immunization History   Administered Date(s) Administered     COVID-19,PF,Pfizer 01/30/2021, 02/23/2021     FLU 6-35 months 10/10/2009     Flu 65+ Years 10/13/2017     Influenza (High Dose) 3 valent vaccine 09/14/2015, 10/27/2016, 09/04/2018, 10/08/2019     Influenza (IIV3) PF 11/10/2011     Influenza, Quad, High Dose, Pf, 65yr + 10/08/2020     Influenza, Whole Virus 10/16/2013     Pneumo Conj 13-V (2010&after) 07/23/2015     Pneumococcal 23 valent 10/31/2012     Tdap (Adacel,Boostrix) 10/31/2012       ROS A comprehensive review of systems was performed and was otherwise negative    Medications, allergies, and problem list were reviewed and updated    Exam  /46   Pulse 72   Wt 110.2 kg (243 lb)   BMI 33.89 kg/m    Alert oriented.  No jaundice.  Lungs clear.  Heart is regular but with premature beat every 3rd-4th beat.  No murmur rub or gallop.  Trace ankle edema bilaterally, stable.  Abdomen mildly overweight but nontender.  No epigastric pain.  Gait is just mildly unsteady, no foot drop or leg weakness.  Otherwise nonfocal neurologic exam.    Assessment/Plan  1. Essential hypertension  Controlled.  He denies significant low blood pressure events or orthostasis.    Patient was told to monitor for lower blood pressures.  If lower blood pressure or  significant creatinine elevation, may need reduction of losartan down to 50 mg a day, but does have a history of severe hypertension with hypertensive encephalopathy.  I suspect this is largely associate with his sleep apnea.    I did recommend a 4-month follow-up with physician in [place].    2. Chronic renal impairment, stage 3b  Chronic.  Monitor closely with his losartan treatment.  Edema mild, controlled.    3. Coronary artery disease due to calcified coronary lesion  Mild stable exertional angina, uses as needed nitroglycerin sublingual.  He did not tolerate long-acting nitroglycerin in the past with dizziness.    Continue aspirin and Lipitor.  Patient was told to seek medical attention immediately if increasing chest pain or chest pain at rest.    4. Encounter for therapeutic drug monitoring    - Comprehensive metabolic panel  - CK total    5. Unsteady gait  Consistent with peripheral neuropathy, that is likely associate with sleep apnea, chronic alcohol use and to some extent diabetes.    He also has had a previous hypertensive encephalopathy with punctate strokes.    Patient was offered referral to neurology for further evaluation but he declined.    He does use a cane, I did suggest he consider a walker.    I did recommend physical therapy and balance/gait training but he declined.    Patient was counseled to stop drinking alcohol.    6. Fatigue, unspecified type  Suspected sleep apnea but he again declines referral for sleep clinic.  - TSH    7. Peripheral polyneuropathy  Consistent with above.  - Vitamin B12      Return in about 8 months (around 4/19/2022) for Routine preventive.   Patient Instructions   If you do begin having increasing lightheaded spells or feeling like you are going to faint, or significant worsening shortness of breath or fatigue with exertion and walking, seek medical attention at that time.  You may need a heart monitor and evaluation for pacemaker at that time.    You have likely  peripheral neuropathy, associated with alcohol use, diabetes and I suspect you have sleep apnea.    I do recommend you reduce or stop alcohol.  Consider sleep apnea evaluation in the future.  Moderate weight loss can help reduce sleep apnea.    You can consider seeing physical therapy and getting on a daily balance and leg strengthening exercises.    You could consider referral to neurology for further evaluation of the peripheral neuropathy.    Follow-up with me for a physical exam in the spring when you return to [place].  I would recommend a 4-month follow-up with your physician in [place].    If any low blood sugars less than 80, reduce the glimepiride down to 2 mg a day.    Dimitri Ma MD, MD        Current Outpatient Medications   Medication Sig Dispense Refill     aspirin 325 MG tablet [ASPIRIN 325 MG TABLET] Take 1 tablet by mouth daily.       atorvastatin (LIPITOR) 40 MG tablet [ATORVASTATIN (LIPITOR) 40 MG TABLET] TAKE 1 TABLET BY MOUTH AT BEDTIME 90 tablet 2     azelastine (ASTELIN) 137 mcg (0.1 %) nasal spray [AZELASTINE (ASTELIN) 137 MCG (0.1 %) NASAL SPRAY] 2 sprays in each nostril up to 3 times a day as needed for runny nose. 30 mL 12     blood glucose test (GLUCOSE BLOOD) strips [BLOOD GLUCOSE TEST (GLUCOSE BLOOD) STRIPS] USE TO TEST BLOOD SUGAR 1-2 TIMES DAILY       carvedilol (COREG) 3.125 MG tablet [CARVEDILOL (COREG) 3.125 MG TABLET] Take 1 tablet (3.125 mg total) by mouth 2 (two) times a day with meals. 180 tablet 2     febuxostat (ULORIC) 40 MG TABS tablet Take 1 tablet (40 mg) by mouth daily (Patient taking differently: Take 40 mg by mouth every other day ) 90 tablet 3     glimepiride (AMARYL) 2 MG tablet [GLIMEPIRIDE (AMARYL) 2 MG TABLET] Take 2 tablets (4 mg) by mouth every morning with meal. 180 tablet 3     losartan (COZAAR) 50 MG tablet [LOSARTAN (COZAAR) 50 MG TABLET] Take 1 tablet (50 mg total) by mouth 2 (two) times a day. 180 tablet 3     NIFEdipine (PROCARDIA XL) 30 MG 24 hr  tablet [NIFEDIPINE (PROCARDIA XL) 30 MG 24 HR TABLET] TAKE 1 TABLET BY MOUTH TWICE DAILY. SWALLOW WHOLE. DO NOT CRUSH OR CHEW. (Patient taking differently: Take 1 tablet by mouth daily ) 180 tablet 3     nitroglycerin (NITROSTAT) 0.4 MG SL tablet [NITROGLYCERIN (NITROSTAT) 0.4 MG SL TABLET] DISSOLVE ONE TABLET UNDER THE TONGUE EVERY 5 MINUTES AS NEEDED FOR CHEST PAIN.  DO NOT EXCEED A TOTAL OF 3 DOSES IN 15 MINUTES 25 tablet 2     semaglutide (OZEMPIC) 0.25 mg or 0.5 mg(2 mg/1.5 mL) PnIj [SEMAGLUTIDE (OZEMPIC) 0.25 MG OR 0.5 MG(2 MG/1.5 ML) PNIJ] Inject 0.5 mg under the skin once a week. 3 Syringe 3     Allergies   Allergen Reactions     Allopurinol Other (See Comments)     Other reaction(s): Chest Tightness, Gave pt pains in chest like a heart attack, Pain     Social History     Tobacco Use     Smoking status: Former Smoker     Packs/day: 1.50     Years: 15.00     Pack years: 22.50     Types: Cigarettes     Quit date: 1981     Years since quittin.6     Smokeless tobacco: Never Used   Substance Use Topics     Alcohol use: Yes     Alcohol/week: 14.0 standard drinks     Drug use: None

## 2021-08-19 NOTE — PATIENT INSTRUCTIONS
If you do begin having increasing lightheaded spells or feeling like you are going to faint, or significant worsening shortness of breath or fatigue with exertion and walking, seek medical attention at that time.  You may need a heart monitor and evaluation for pacemaker at that time.    You have likely peripheral neuropathy, associated with alcohol use, diabetes and I suspect you have sleep apnea.    I do recommend you reduce or stop alcohol.  Consider sleep apnea evaluation in the future.  Moderate weight loss can help reduce sleep apnea.    You can consider seeing physical therapy and getting on a daily balance and leg strengthening exercises.    You could consider referral to neurology for further evaluation of the peripheral neuropathy.    Follow-up with me for a physical exam in the spring when you return to [place].  I would recommend a 4-month follow-up with your physician in [place].    If any low blood sugars less than 80, reduce the glimepiride down to 2 mg a day.

## 2021-08-20 ENCOUNTER — LAB (OUTPATIENT)
Dept: LAB | Facility: CLINIC | Age: 81
End: 2021-08-20
Payer: MEDICARE

## 2021-08-20 DIAGNOSIS — E11.9 TYPE 2 DIABETES MELLITUS WITHOUT COMPLICATION, WITHOUT LONG-TERM CURRENT USE OF INSULIN (H): ICD-10-CM

## 2021-08-20 LAB — HBA1C MFR BLD: 7 % (ref 0–5.6)

## 2021-08-20 PROCEDURE — 36415 COLL VENOUS BLD VENIPUNCTURE: CPT

## 2021-08-20 PROCEDURE — 83036 HEMOGLOBIN GLYCOSYLATED A1C: CPT

## 2021-08-23 ENCOUNTER — TELEPHONE (OUTPATIENT)
Dept: INTERNAL MEDICINE | Facility: CLINIC | Age: 81
End: 2021-08-23

## 2021-08-23 NOTE — TELEPHONE ENCOUNTER
Question set received and was placed in the [facility] FOLDER for completion     Prior Authorization Retail Medication Request    Medication/Dose: febuxostat (ULORIC) 40 MG TABS tablet  ICD code (if different than what is on RX): History of gout (Z87.39)   Previously Tried and Failed:  Rationale:    Insurance Name: [facility]  Insurance ID: [id number]      Pharmacy Information (if different than what is on RX)  Name: [facility] [address] [place] [address]  Phone: [phone]

## 2021-08-23 NOTE — TELEPHONE ENCOUNTER
Central Prior Authorization Team   Phone: [phone]    PA Initiation    Medication: febuxostat (ULORIC) 40 MG TABS tablet  Insurance Company: [employer] - Phone [phone] Fax [phone]  Pharmacy Filling the Rx: [facility] [address] [place] [address]  Filling Pharmacy Phone: [phone]  Filling Pharmacy Fax:    Start Date: 8/23/2021    Form filled out, faxed back to [employer] medic[employer] for review.

## 2021-08-24 NOTE — TELEPHONE ENCOUNTER
Prior Authorization Approval    Authorization Effective Date: 5/25/2021  Authorization Expiration Date: 8/23/2022  Medication: febuxostat (ULORIC) 40 MG TABS tablet  Approved Dose/Quantity:   Reference #:     Insurance Company: [employer] - Phone [phone] Fax [phone]  Expected CoPay:       CoPay Card Available:      Foundation Assistance Needed:    Which Pharmacy is filling the prescription (Not needed for infusion/clinic administered): [facility] [address] [place] [address]  Pharmacy Notified: Yes  Patient Notified: Yes

## 2021-10-11 ENCOUNTER — HEALTH MAINTENANCE LETTER (OUTPATIENT)
Age: 81
End: 2021-10-11

## 2022-02-07 DIAGNOSIS — E78.00 HYPERCHOLESTEROLEMIA: ICD-10-CM

## 2022-02-07 NOTE — TELEPHONE ENCOUNTER
Refill Request  Medication name: Pending Prescriptions:                       Disp   Refills    atorvastatin (LIPITOR) 40 MG tablet       90 tab*2          Who prescribed the medication: Anil  Last refill on medication: 10/28/21  Requested Pharmacy: [facility]  Last appointment with PCP: 08/19/21  Next appointment: Appointment scheduled for 05/02/22

## 2022-02-08 RX ORDER — ATORVASTATIN CALCIUM 40 MG/1
TABLET, FILM COATED ORAL
Qty: 90 TABLET | Refills: 3 | Status: SHIPPED | OUTPATIENT
Start: 2022-02-08 | End: 2023-02-15

## 2022-03-27 ENCOUNTER — HEALTH MAINTENANCE LETTER (OUTPATIENT)
Age: 82
End: 2022-03-27

## 2022-03-28 DIAGNOSIS — I25.84 CORONARY ARTERY DISEASE DUE TO CALCIFIED CORONARY LESION: ICD-10-CM

## 2022-03-28 DIAGNOSIS — I10 ESSENTIAL HYPERTENSION, BENIGN: ICD-10-CM

## 2022-03-28 DIAGNOSIS — I25.10 CORONARY ARTERY DISEASE DUE TO CALCIFIED CORONARY LESION: ICD-10-CM

## 2022-03-28 NOTE — TELEPHONE ENCOUNTER
Refill Request  Medication name: Pending Prescriptions:                       Disp   Refills    nitroGLYcerin (NITROSTAT) 0.4 MG sublingu*25 tab*2            Sig: For chest pain place 1 tablet under the tongue           every 5 minutes for 3 doses. If symptoms persist           5 minutes after 1st dose call 911.    Who prescribed the medication: Anil  Last refill on medication: 10/12/20  Requested Pharmacy: [facility]  Last appointment with PCP: 08/19/21  Next appointment: Appointment scheduled for 06/08/22

## 2022-03-29 RX ORDER — NITROGLYCERIN 0.4 MG/1
TABLET SUBLINGUAL
Qty: 25 TABLET | Refills: 2 | Status: SHIPPED | OUTPATIENT
Start: 2022-03-29 | End: 2022-07-11

## 2022-03-29 RX ORDER — LOSARTAN POTASSIUM 50 MG/1
50 TABLET ORAL 2 TIMES DAILY
Qty: 180 TABLET | Refills: 1 | Status: SHIPPED | OUTPATIENT
Start: 2022-03-29 | End: 2022-06-27 | Stop reason: DRUGHIGH

## 2022-05-17 DIAGNOSIS — E11.9 TYPE 2 DIABETES MELLITUS WITHOUT COMPLICATION, WITHOUT LONG-TERM CURRENT USE OF INSULIN (H): ICD-10-CM

## 2022-05-17 RX ORDER — GLIMEPIRIDE 2 MG/1
TABLET ORAL
Qty: 180 TABLET | Refills: 3 | Status: SHIPPED | OUTPATIENT
Start: 2022-05-17 | End: 2023-06-15

## 2022-05-17 NOTE — TELEPHONE ENCOUNTER
Refill Request  Medication name: Pending Prescriptions:                       Disp   Refills    glimepiride (AMARYL) 2 MG tablet          180 ta*3            Sig: [GLIMEPIRIDE (AMARYL) 2 MG TABLET] Take 2 tablets           (4 mg) by mouth every morning with meal.    Who prescribed the medication: MEGAN  Last refill on medication: 05/13/2021  Requested Pharmacy: [facility]  Last appointment with PCP: 08/23/2021  Next appointment: Appointment scheduled for 06/08/2022

## 2022-06-06 DIAGNOSIS — E11.59 TYPE 2 DIABETES MELLITUS WITH VASCULAR DISEASE (H): ICD-10-CM

## 2022-06-06 NOTE — TELEPHONE ENCOUNTER
Refill Request  Medication name: Pending Prescriptions:                       Disp   Refills    semaglutide (OZEMPIC (0.25 OR 0.5 MG/DOSE*                    Sig: Inject 0.5 mg Subcutaneous once a week    Who prescribed the medication: Ainl  Last refill on medication: 09/13/21  Requested Pharmacy: [facility]  Last appointment with PCP: 08/19/21  Next appointment: Appointment scheduled for 06/08/22

## 2022-06-07 RX ORDER — SEMAGLUTIDE 1.34 MG/ML
0.5 INJECTION, SOLUTION SUBCUTANEOUS WEEKLY
Qty: 4.5 ML | Refills: 4 | Status: SHIPPED | OUTPATIENT
Start: 2022-06-07 | End: 2023-05-23

## 2022-06-07 NOTE — TELEPHONE ENCOUNTER
"Routing refill request to provider for review/approval because:  Labs out of range:      Last Written Prescription Date:  5/27/21  Last Fill Quantity: 3,  # refills: 3   Last office visit provider:  8/19/21     Requested Prescriptions   Pending Prescriptions Disp Refills     semaglutide (OZEMPIC (0.25 OR 0.5 MG/DOSE)) 2 MG/1.5ML SOPN pen       Sig: Inject 0.5 mg Subcutaneous once a week       GLP-1 Agonists Protocol Failed - 6/6/2022 10:34 AM        Failed - HgbA1C in past 3 or 6 months     If HgbA1C is 8 or greater, it needs to be on file within the past 3 months.  If less than 8, must be on file within the past 6 months.     Recent Labs   Lab Test 08/20/21  1556   A1C 7.0*             Failed - Normal serum creatinine on file in past 12 months     Recent Labs   Lab Test 08/19/21  0837   CR 2.07*       Ok to refill medication if creatinine is low          Passed - Medication is active on med list        Passed - Patient is age 18 or older        Passed - Recent (6 mo) or future (30 days) visit within the authorizing provider's specialty     Patient had office visit in the last 6 months or has a visit in the next 30 days with authorizing provider.  See \"Patient Info\" tab in inbasket, or \"Choose Columns\" in Meds & Orders section of the refill encounter.                 Luisa Oswald RN 06/06/22 11:41 PM  "

## 2022-06-08 ENCOUNTER — OFFICE VISIT (OUTPATIENT)
Dept: INTERNAL MEDICINE | Facility: CLINIC | Age: 82
End: 2022-06-08
Payer: MEDICARE

## 2022-06-08 VITALS
OXYGEN SATURATION: 99 % | DIASTOLIC BLOOD PRESSURE: 64 MMHG | WEIGHT: 246 LBS | HEIGHT: 71 IN | SYSTOLIC BLOOD PRESSURE: 138 MMHG | HEART RATE: 66 BPM | BODY MASS INDEX: 34.44 KG/M2

## 2022-06-08 DIAGNOSIS — Z51.81 ENCOUNTER FOR THERAPEUTIC DRUG MONITORING: ICD-10-CM

## 2022-06-08 DIAGNOSIS — I25.84 CORONARY ARTERY DISEASE DUE TO CALCIFIED CORONARY LESION: ICD-10-CM

## 2022-06-08 DIAGNOSIS — N18.4 CHRONIC RENAL INSUFFICIENCY, STAGE 4 (SEVERE) (H): ICD-10-CM

## 2022-06-08 DIAGNOSIS — I10 ESSENTIAL HYPERTENSION: ICD-10-CM

## 2022-06-08 DIAGNOSIS — I25.10 CORONARY ARTERY DISEASE DUE TO CALCIFIED CORONARY LESION: ICD-10-CM

## 2022-06-08 DIAGNOSIS — G62.9 PERIPHERAL POLYNEUROPATHY: ICD-10-CM

## 2022-06-08 DIAGNOSIS — E11.69 TYPE 2 DIABETES MELLITUS WITH OTHER SPECIFIED COMPLICATION, WITHOUT LONG-TERM CURRENT USE OF INSULIN (H): ICD-10-CM

## 2022-06-08 DIAGNOSIS — Z00.00 ENCOUNTER FOR WELLNESS EXAMINATION IN ADULT: Primary | ICD-10-CM

## 2022-06-08 DIAGNOSIS — I10 ESSENTIAL HYPERTENSION, BENIGN: ICD-10-CM

## 2022-06-08 LAB
ALBUMIN SERPL-MCNC: 3.8 G/DL (ref 3.5–5)
ALP SERPL-CCNC: 80 U/L (ref 45–120)
ALT SERPL W P-5'-P-CCNC: 34 U/L (ref 0–45)
ANION GAP SERPL CALCULATED.3IONS-SCNC: 13 MMOL/L (ref 5–18)
AST SERPL W P-5'-P-CCNC: 23 U/L (ref 0–40)
BILIRUB SERPL-MCNC: 0.5 MG/DL (ref 0–1)
BUN SERPL-MCNC: 33 MG/DL (ref 8–28)
CALCIUM SERPL-MCNC: 9.2 MG/DL (ref 8.5–10.5)
CHLORIDE BLD-SCNC: 107 MMOL/L (ref 98–107)
CHOLEST SERPL-MCNC: 148 MG/DL
CO2 SERPL-SCNC: 22 MMOL/L (ref 22–31)
CREAT SERPL-MCNC: 2.16 MG/DL (ref 0.7–1.3)
ERYTHROCYTE [DISTWIDTH] IN BLOOD BY AUTOMATED COUNT: 13.3 % (ref 10–15)
FASTING STATUS PATIENT QL REPORTED: YES
GFR SERPL CREATININE-BSD FRML MDRD: 30 ML/MIN/1.73M2
GLUCOSE BLD-MCNC: 184 MG/DL (ref 70–125)
HBA1C MFR BLD: 7.6 % (ref 0–5.6)
HCT VFR BLD AUTO: 38.3 % (ref 40–53)
HDLC SERPL-MCNC: 31 MG/DL
HGB BLD-MCNC: 12.7 G/DL (ref 13.3–17.7)
LDLC SERPL CALC-MCNC: 55 MG/DL
MCH RBC QN AUTO: 31.3 PG (ref 26.5–33)
MCHC RBC AUTO-ENTMCNC: 33.2 G/DL (ref 31.5–36.5)
MCV RBC AUTO: 94 FL (ref 78–100)
PLATELET # BLD AUTO: 229 10E3/UL (ref 150–450)
POTASSIUM BLD-SCNC: 4.4 MMOL/L (ref 3.5–5)
PROT SERPL-MCNC: 6.8 G/DL (ref 6–8)
RBC # BLD AUTO: 4.06 10E6/UL (ref 4.4–5.9)
SODIUM SERPL-SCNC: 142 MMOL/L (ref 136–145)
TRIGL SERPL-MCNC: 311 MG/DL
WBC # BLD AUTO: 6.9 10E3/UL (ref 4–11)

## 2022-06-08 PROCEDURE — 80053 COMPREHEN METABOLIC PANEL: CPT | Performed by: INTERNAL MEDICINE

## 2022-06-08 PROCEDURE — 80061 LIPID PANEL: CPT | Performed by: INTERNAL MEDICINE

## 2022-06-08 PROCEDURE — 83036 HEMOGLOBIN GLYCOSYLATED A1C: CPT | Performed by: INTERNAL MEDICINE

## 2022-06-08 PROCEDURE — G0439 PPPS, SUBSEQ VISIT: HCPCS | Performed by: INTERNAL MEDICINE

## 2022-06-08 PROCEDURE — 36415 COLL VENOUS BLD VENIPUNCTURE: CPT | Performed by: INTERNAL MEDICINE

## 2022-06-08 PROCEDURE — 85027 COMPLETE CBC AUTOMATED: CPT | Performed by: INTERNAL MEDICINE

## 2022-06-08 RX ORDER — NIFEDIPINE 30 MG/1
30 TABLET, EXTENDED RELEASE ORAL DAILY
Qty: 90 TABLET | Refills: 3 | Status: SHIPPED | OUTPATIENT
Start: 2022-06-08 | End: 2022-07-27

## 2022-06-08 RX ORDER — CARVEDILOL 3.12 MG/1
3.12 TABLET ORAL 2 TIMES DAILY WITH MEALS
Qty: 180 TABLET | Refills: 2 | Status: SHIPPED | OUTPATIENT
Start: 2022-06-08 | End: 2022-08-16

## 2022-06-08 ASSESSMENT — ENCOUNTER SYMPTOMS
HEARTBURN: 0
HEADACHES: 0
DYSURIA: 0
PARESTHESIAS: 0
FEVER: 0
FREQUENCY: 0
DIZZINESS: 0
COUGH: 0
ARTHRALGIAS: 0
PALPITATIONS: 0
MYALGIAS: 0
CHILLS: 0
HEMATOCHEZIA: 0
HEMATURIA: 0
SORE THROAT: 0
WEAKNESS: 1
ABDOMINAL PAIN: 0
SHORTNESS OF BREATH: 0
NAUSEA: 0
CONSTIPATION: 0
DIARRHEA: 0
NERVOUS/ANXIOUS: 0
EYE PAIN: 0
JOINT SWELLING: 0

## 2022-06-08 ASSESSMENT — ACTIVITIES OF DAILY LIVING (ADL): CURRENT_FUNCTION: NO ASSISTANCE NEEDED

## 2022-06-08 NOTE — PROGRESS NOTES
SUBJECTIVE:   Yohan Hayward is a 81 year old male who presents for Preventive Visit.  Lives independently.  [place] in [place].  Lives independently with wife    Obesity with suspected sleep apnea but is not wanted evaluation.  He is still drinking some scotch in the evening little less than in the past.    Does have a history of peripheral neuropathy but no foot sores.  No falls.  Normal B12 level and TSH August 2021.    Diabetes type 2 on Ozempic 0.5 mg weekly and glimepiride 4 mg in the morning.  Hemoglobin A1c was 7.0% last August.    His blood sugars in the morning are still around 150.  They are better controlled during the day.  He denies any low blood sugar events.    June 2021 ophthalmology exam negative for retinopathy but he does have a history of macular vein occlusion with neovascularization.  No vision changes.  No headache complaints.    His blood pressure has been well controlled in the 130s/60s.  He is having some dizzy spells and lightheaded spells on twice a day nifedipine, resolved with reducing to once a day nifedipine.    Still on losartan 50 mg twice daily and carvedilol 3.125 mg twice daily.    He does have significant chronic renal insufficiency with a GFR of 29 and creatinine 2.0 on last check.  He does not have edema issues.  He has not seen nephrology recently.    History of hypertensive encephalopathy with left parietal and occipital punctate strokes in 2014.  2014 MRA did not show any severe vascular stenosis or carotid artery disease.    Coronary disease but no history of MI.  2013 angiogram showed moderate diffuse disease, no stent.  2019 stress test negative.    He occasionally gets some fatigue when walking up hills but denies chest pain.    He gets some shoulder pain bilateral that feels stiff when he gets up in the morning but resolves within 5 to 10 minutes with movement and stretching.  He does not have shortness of breath or chest pain with that and does not get that with  "activity.    He continues on Lipitor 40 mg and a full dose aspirin.  No history of GI bleeding and no epigastric pain or melena.    No new cough.  No mouth sores or swallowing difficulty.    Quit smoking in 1981.    Moderate BPH symptoms with 2-3 times nocturia, stable.  No dysuria or hematuria.  He does have erectile dysfunction.    Patient has been advised of split billing requirements and indicates understanding: Yes  Are you in the first 12 months of your Medicare coverage?  No    Healthy Habits:     In general, how would you rate your overall health?  Good    Frequency of exercise:  None    Do you usually eat at least 4 servings of fruit and vegetables a day, include whole grains    & fiber and avoid regularly eating high fat or \"junk\" foods?  No    Taking medications regularly:  Yes    Medication side effects:  None    Ability to successfully perform activities of daily living:  No assistance needed    Home Safety:  No safety concerns identified    Hearing Impairment:  Difficulty understanding soft or whispered speech    In the past 6 months, have you been bothered by leaking of urine?  No    In general, how would you rate your overall mental or emotional health?  Excellent      PHQ-2 Total Score: 0    Additional concerns today:  No    Do you feel safe in your environment? Yes    Have you ever done Advance Care Planning? (For example, a Health Directive, POLST, or a discussion with a medical provider or your loved ones about your wishes): Yes, advance care planning is on file.      Fall risk  Fallen 2 or more times in the past year?: No  Any fall with injury in the past year?: No  click delete button to remove this line now  Cognitive Screening   1) Repeat 3 items (Leader, Season, Table)    2) Clock draw: NORMAL  3) 3 item recall: Recalls 3 objects  Results: 3 items recalled: COGNITIVE IMPAIRMENT LESS LIKELY    Mini-CogTM Copyright S Amrita. Licensed by the author for use in [facility]; reprinted " with permission ([email or web address]). All rights reserved.      Do you have sleep apnea, excessive snoring or daytime drowsiness?: no    Reviewed and updated as needed this visit by clinical staff   Tobacco  Allergies  Meds                Reviewed and updated as needed this visit by Provider     Meds               Social History     Tobacco Use     Smoking status: Former Smoker     Packs/day: 1.50     Years: 15.00     Pack years: 22.50     Types: Cigarettes     Quit date: 1981     Years since quittin.4     Smokeless tobacco: Never Used   Substance Use Topics     Alcohol use: Yes     Alcohol/week: 14.0 standard drinks     If you drink alcohol do you typically have >3 drinks per day or >7 drinks per week? No    Alcohol Use 2022   Prescreen: >3 drinks/day or >7 drinks/week? Yes   Prescreen: >3 drinks/day or >7 drinks/week? -     AUDIT - Alcohol Use Disorders Identification Test - Reproduced from the World Health Organization Audit 2001 (Second Edition) 2022   1.  How often do you have a drink containing alcohol? 4 or more times a week   2.  How many drinks containing alcohol do you have on a typical day when you are drinking? 1 or 2   3.  How often do you have five or more drinks on one occasion? Never   9.  Have you or someone else been injured because of your drinking? No   10. Has a relative, friend, doctor or other health care worker been concerned about your drinking or suggested you cut down? Yes, during the last year         Current providers sharing in care for this patient include:   Patient Care Team:  Dimitri Ma MD as PCP - General (Internal Medicine)  Monique Stacy, PharmD as Pharmacist (Pharmacist)  Dimitri Ma MD as Assigned PCP    The following health maintenance items are reviewed in Epic and correct as of today:  Health Maintenance Due   Topic Date Due     MICROALBUMIN  Never done     DIABETIC FOOT EXAM  Never done     HEMOGLOBIN  Never done     URINALYSIS  Never done     ZOSTER  IMMUNIZATION (1 of 2) Never done     COVID-19 Vaccine (3 - Booster for Pfizer series) 2021     A1C  2022     LIPID  2022     EYE EXAM  2022     Lab work is in process  Patient Active Problem List   Diagnosis     Atherosclerotic heart disease native coronary artery w/angina pectoris (H)     Type 2 diabetes mellitus with vascular disease (H)     Essential hypertension     Gout     Hyperlipidemia     Stage 3 chronic kidney disease (H)     TIA (transient ischemic attack)     Class 1 obesity due to excess calories with serious comorbidity and body mass index (BMI) of 34.0 to 34.9 in adult     Past Surgical History:   Procedure Laterality Date     ESOPHAGOSCOPY, GASTROSCOPY, DUODENOSCOPY (EGD), COMBINED  2011       Social History     Tobacco Use     Smoking status: Former Smoker     Packs/day: 1.50     Years: 15.00     Pack years: 22.50     Types: Cigarettes     Quit date: 1981     Years since quittin.4     Smokeless tobacco: Never Used   Substance Use Topics     Alcohol use: Yes     Alcohol/week: 14.0 standard drinks     Family History   Problem Relation Age of Onset     Colon Cancer Mother         His mother passed away at age 64 due to colon cancer.     Acute Myocardial Infarction Father 42.00     Hypertension Father      Heart Failure Father         His father passed away at age 74 due to congestive heart failure.     Hypertension Sister      Hypertension Brother      Hypertension Brother          Current Outpatient Medications   Medication Sig Dispense Refill     aspirin 325 MG tablet [ASPIRIN 325 MG TABLET] Take 1 tablet by mouth daily.       atorvastatin (LIPITOR) 40 MG tablet [ATORVASTATIN (LIPITOR) 40 MG TABLET] TAKE 1 TABLET BY MOUTH AT BEDTIME 90 tablet 3     azelastine (ASTELIN) 137 mcg (0.1 %) nasal spray [AZELASTINE (ASTELIN) 137 MCG (0.1 %) NASAL SPRAY] 2 sprays in each nostril up to 3 times a day as needed for runny nose. 30 mL 12     blood glucose test (GLUCOSE  "BLOOD) strips [BLOOD GLUCOSE TEST (GLUCOSE BLOOD) STRIPS] USE TO TEST BLOOD SUGAR 1-2 TIMES DAILY       carvedilol (COREG) 3.125 MG tablet Take 1 tablet (3.125 mg) by mouth 2 times daily (with meals) 180 tablet 2     febuxostat (ULORIC) 40 MG TABS tablet Take 1 tablet (40 mg) by mouth daily (Patient taking differently: Take 40 mg by mouth every other day) 90 tablet 3     glimepiride (AMARYL) 2 MG tablet [GLIMEPIRIDE (AMARYL) 2 MG TABLET] Take 2 tablets (4 mg) by mouth every morning with meal. 180 tablet 3     losartan (COZAAR) 50 MG tablet Take 1 tablet (50 mg) by mouth 2 times daily 180 tablet 1     NIFEdipine ER OSMOTIC (PROCARDIA XL) 30 MG 24 hr tablet Take 1 tablet (30 mg) by mouth daily Dose reduction to once a day 90 tablet 3     nitroGLYcerin (NITROSTAT) 0.4 MG sublingual tablet [NITROGLYCERIN (NITROSTAT) 0.4 MG SL TABLET] DISSOLVE ONE TABLET UNDER THE TONGUE EVERY 5 MINUTES AS NEEDED FOR CHEST PAIN.  DO NOT EXCEED A TOTAL OF 3 DOSES IN 15 MINUTES 25 tablet 2     semaglutide (OZEMPIC, 0.25 OR 0.5 MG/DOSE,) 2 MG/1.5ML SOPN pen Inject 0.5 mg Subcutaneous once a week 4.5 mL 4     Allergies   Allergen Reactions     Allopurinol Other (See Comments)     Other reaction(s): Chest Tightness, Gave pt pains in chest like a heart attack, Pain       Review of Systems  NoConstitutional, HEENT, cardiovascular, pulmonary, GI, , musculoskeletal, neuro, skin, endocrine and psych systems are negative, except as otherwise noted.    OBJECTIVE:   /64 (BP Location: Right arm, Patient Position: Sitting, Cuff Size: Adult Large)   Pulse 66   Ht 1.803 m (5' 11\")   Wt 111.6 kg (246 lb)   SpO2 99%   BMI 34.31 kg/m   Estimated body mass index is 34.31 kg/m  as calculated from the following:    Height as of this encounter: 1.803 m (5' 11\").    Weight as of this encounter: 111.6 kg (246 lb).  Physical Exam  Alert and oriented x3 with good mood and affect.  Clock face drawing normal and word recall normal.  Mobility exam within " normal limits.  Able to climb up on exam table.  Gait within normal limits and no parkinsonian signs.    Pupils and irises equal and reactive.  Extraocular muscles intact.  No jaundice or conjunctivitis.  External ears and nose exam is normal.  Wears hearing aids.  No significant cerumen.  Tympanic membranes are normal.  No cervical or clavicular or axillary adenopathy.  No JVD and no carotid bruits.  No thyromegaly or nodularity.  Lungs are clear to auscultation with good respiratory excursion.  Heart is regular without murmur rub or gallop.  +1 pedal pulses bilaterally and just trace ankle edema bilaterally, similar to previous.  No preulcerative calluses on feet, some mild decrease sensation.  Thickened toenails with fungus.  Abdomen is mildly overweight, nontender.  No splenomegaly.  Ventral hernia noted.  No pulsatile abdominal mass.  Mild hepatomegaly with 2 cm liver but nontender and no mass.  Skin exam without suspicious skin lesions noted.  ASSESSMENT / PLAN:   (Z00.00) Encounter for wellness examination in adult  (primary encounter diagnosis)  Comment: Patient's main issue is vascular disease and diabetes, also with moderate obesity and alcohol history with suspected sleep apnea.    I counseled patient to reduce alcohol.  Continue regular walking and activity, work on weight loss and diabetes control.  Plan: REVIEW OF HEALTH MAINTENANCE PROTOCOL ORDERS,         Full Code        He confirmed his full code wishes.  Up-to-date on immunizations but he did not want to have COVID booster today.  We discussed the COVID treatment protocol.    (E11.69) Type 2 diabetes mellitus with other specified complication, without long-term current use of insulin (H)  Comment: Adequately controlled.  Goal hemoglobin A1c less than 8%.  He was warned about low blood sugar risk with glimepiride.  Tolerating Ozempic.  Plan: HEMOGLOBIN A1C        Peripheral neuropathy, we discussed with foot care.    He has an appoint with  ophthalmology this summer    (I10) Essential hypertension  Comment: Controlled.  Lightheaded dizzy spells with nifedipine resolved with reducing dose to once a day.    Continue carvedilol.    Losartan may need to be adjusted if significant increase in BUN and creatinine or potassium with his chronic renal insufficiency.  No change in medication at this time.  Follow-up with nephrology  Plan: Follow-up with me this fall for blood pressure and general checkup    (N18.4) Chronic renal insufficiency, stage 4 (severe) (H)  Comment: Kidney function has been stable, but severe chronic renal deficiency.  Refer to nephrology for long-term management  Plan: Adult Nephrology  Referral        Monitor for losartan treatment as above    (I25.10,  I25.84) Coronary artery disease due to calcified coronary lesion  Comment: No new exertional symptoms.  Sees cardiology on June 21.  Continue current medical management with Lipitor 40 mg and aspirin daily.  Plan: Lipid panel reflex to direct LDL Fasting            (G62.9) Peripheral polyneuropathy  Comment: Stable.  Patient was counseled to reduce alcohol.  Patient did not want evaluation for sleep apnea  Plan:     (Z51.81) Encounter for therapeutic drug monitoring  Comment:   Plan: Comprehensive metabolic panel, CBC with         platelets            (I10) Essential hypertension, benign  Comment:   Plan: NIFEdipine ER OSMOTIC (PROCARDIA XL) 30 MG 24         hr tablet, carvedilol (COREG) 3.125 MG tablet        History of hypertensive encephalopathy, possibly associated with sleep apnea and history of alcohol.  No neurologic changes.  History of stroke.    Bilateral shoulder pain in the morning consistent with musculoskeletal, resolves with movement and stretching which she will continue daily.      COUNSELING:  Reviewed preventive health counseling, as reflected in patient instructions       Regular exercise       Healthy diet/nutrition       Vision screening    Estimated body  "mass index is 34.31 kg/m  as calculated from the following:    Height as of this encounter: 1.803 m (5' 11\").    Weight as of this encounter: 111.6 kg (246 lb).    Weight management plan: Discussed healthy diet and exercise guidelines    He reports that he quit smoking about 41 years ago. His smoking use included cigarettes. He has a 22.50 pack-year smoking history. He has never used smokeless tobacco.      Appropriate preventive services were discussed with this patient, including applicable screening as appropriate for cardiovascular disease, diabetes, osteopenia/osteoporosis, and glaucoma.  As appropriate for age/gender, discussed screening for colorectal cancer, prostate cancer, breast cancer, and cervical cancer. Checklist reviewing preventive services available has been given to the patient.    Reviewed patients plan of care and provided an AVS. The Basic Care Plan (routine screening as documented in Health Maintenance) for Yohan meets the Care Plan requirement. This Care Plan has been established and reviewed with the Patient.    Counseling Resources:  ATP IV Guidelines  Pooled Cohorts Equation Calculator  Breast Cancer Risk Calculator  Breast Cancer: Medication to Reduce Risk  FRAX Risk Assessment  ICSI Preventive Guidelines  Dietary Guidelines for Americans, 2010  USDA's MyPlate  ASA Prophylaxis  Lung CA Screening    Dimitri Ma MD  [facility]    Identified Health Risks:  "

## 2022-06-08 NOTE — PATIENT INSTRUCTIONS
Continue on the lower dose of the Procardia/nifedipine at 30 mg once a day.    No other medication changes.    If you have any low blood sugars less than 80, contact clinic and you would need to consider reducing the glimepiride.    Continue with daily walking.  20 minutes of daily activity such as walking is recommended.    Follow-up with cardiology later this month to discuss your chest pain symptoms.  If you are having increasing chest pain or shortness of breath, seek medical attention right away.    Monitor your feet on a daily basis for any sign of infection or ulcer.  Wear good shoes at all times.    See ophthalmology December for yearly diabetic eye check.    Make an appointment with nephrology, or the kidney clinic, about your chronic renal insufficiency.    Follow-up in 3 to 4 months before you leave for [place] for a nonfasting follow-up appointment

## 2022-06-13 ENCOUNTER — TRANSFERRED RECORDS (OUTPATIENT)
Dept: HEALTH INFORMATION MANAGEMENT | Facility: CLINIC | Age: 82
End: 2022-06-13
Payer: MEDICARE

## 2022-06-13 LAB — RETINOPATHY: NEGATIVE

## 2022-06-14 DIAGNOSIS — E11.59 TYPE 2 DIABETES MELLITUS WITH VASCULAR DISEASE (H): ICD-10-CM

## 2022-06-15 RX ORDER — SEMAGLUTIDE 1.34 MG/ML
0.5 INJECTION, SOLUTION SUBCUTANEOUS WEEKLY
Qty: 4.5 ML | Refills: 4 | OUTPATIENT
Start: 2022-06-15

## 2022-06-15 NOTE — TELEPHONE ENCOUNTER
Refused as there should already be refills on file.   Filled 6/7/2022 disp 4.5 ml refills 4  Haylee Griffin RN   06/15/22 1:49 PM  [facility] Nurse Advisor

## 2022-06-21 ENCOUNTER — OFFICE VISIT (OUTPATIENT)
Dept: CARDIOLOGY | Facility: CLINIC | Age: 82
End: 2022-06-21
Payer: MEDICARE

## 2022-06-21 VITALS
DIASTOLIC BLOOD PRESSURE: 58 MMHG | WEIGHT: 244 LBS | RESPIRATION RATE: 16 BRPM | HEART RATE: 66 BPM | SYSTOLIC BLOOD PRESSURE: 122 MMHG | BODY MASS INDEX: 34.03 KG/M2

## 2022-06-21 DIAGNOSIS — I10 ESSENTIAL HYPERTENSION, BENIGN: ICD-10-CM

## 2022-06-21 DIAGNOSIS — I25.118 CORONARY ARTERY DISEASE OF NATIVE ARTERY OF NATIVE HEART WITH STABLE ANGINA PECTORIS (H): Primary | ICD-10-CM

## 2022-06-21 DIAGNOSIS — N18.32 STAGE 3B CHRONIC KIDNEY DISEASE (H): ICD-10-CM

## 2022-06-21 DIAGNOSIS — E78.2 MIXED HYPERLIPIDEMIA: ICD-10-CM

## 2022-06-21 DIAGNOSIS — E11.59 TYPE 2 DIABETES MELLITUS WITH VASCULAR DISEASE (H): ICD-10-CM

## 2022-06-21 PROCEDURE — 99214 OFFICE O/P EST MOD 30 MIN: CPT | Performed by: INTERNAL MEDICINE

## 2022-06-21 RX ORDER — NITROGLYCERIN 0.1MG/HR
1 PATCH, TRANSDERMAL 24 HOURS TRANSDERMAL DAILY
Qty: 30 PATCH | Refills: 11 | Status: SHIPPED | OUTPATIENT
Start: 2022-06-21 | End: 2022-07-11

## 2022-06-21 NOTE — LETTER
6/21/2022    Dimitri Ma MD  [address] [place] [address]    RE: Yohan Hayward       Dear Colleague,     I had the pleasure of seeing Yohan Hayward in the [facility].    [facility]   [address], [place] [address]   [email or web address]   OFFICE: [phone]     CARDIOLOGY CLINIC NOTE     Thank you, Dr. Ma, Dimitri DHALIWAL, for asking the [facility] Heart Care team to see Mr. Yohan Hayward to Follow Up (CAD)        Assessment/Recommendations   Assessment:    1. Coronary artery disease - with chronic stable class II angina. Known small vessel disease not amenable to revascularization. He previously tried Imdur with improvement in symptoms, but the effects of the medication wore off by early afternoon so this wasn't continued.  2. Hypertension - with low diastolic pressures and orthostatic light headedness/presyncope after AM medications.  3. CKD stage IIIb  4. Hyperlipidemia - on appropriate statin  5. DMII - managed by Dr. Ma.    Plan:  1. Decrease losartan to 25 mg in the AM and 50 mg at bedtime  2. Start nitroglycerine patch 0.1 mg daily. Remove at bedtime.  3. Continue other medications without changes.  4. Follow up in 1 year or sooner if needed.         History of Present Illness   Mr. Yohan Hayward is a 81 year old male with a significant past history of coronary artery disease, hypertension, hyperlipidemia, and DMII who presents for follow-up.     had his first coronary angiogram in 1994 that revealed single-vessel disease not amenable to revascularization.  He subsequently underwent another coronary angiogram in 2013 that showed moderate diffuse coronary disease with significant stenosis in the ostium of a small circumflex coronary artery that was not amenable to revascularization.  He had moderate diffuse disease elsewhere.    Mr. Hayward is a new patient to me and was last seen by  Dr. Duane Purdy in September 2019.  Recently he reports that he has chronic stable anginal symptoms with primarily with walking up hills.  Symptoms are relieved with rest and occasionally nitroglycerin.  He also endorses significant orthostatic lightheadedness and presyncope that occurs after taking his morning medications.  He had already had his nifedipine decreased over the winter with some improvement in his symptoms.     Other than noted above, Mr. Hayward denies any chest pain/pressure/tightness, shortness of breath at rest or with exertion, light headedness/dizziness, pre-syncope, syncope, lower extremity swelling, palpitations, paroxysmal nocturnal dyspnea (PND), or orthopnea.     Cardiac Problems and Cardiac Diagnostics     Most Recent Cardiac testing:    ECHO (report reviewed):   TTE 1/26/14  CONCLUSION:   1. Normal LV size and function, EF 60% to 65%.   2. Grade 1 diastolic dysfunction.   3. Normal RV size and function.   4. Sclerosis of the mitral and aortic valve without significant stenosis.   5. Negative bubble study with no evidence of right to left shunting.     Lexiscan NM Stress test: dated 9/16/19 revealed     There is no prior study available.    There are occasional premature ventricular contractions noted throughout the study.    The left ventricular ejection fraction is 67%.    The pharmacologic nuclear stress test is negative for inducible myocardial ischemia or infarction.    Cardiac cath: from 5/30/13 demonstrated   DIAGNOSTIC - CORONARY   ? Moderate diffuse corornary artery diseaese in a co-dominant system   ? The Circumflex is relatively small in size.  there is a significant lesion in the origin of this small circumflex that appears not to have changed in 20 years.  This is likely the cause of his abnormal stress test.   ? The LAD has moderate disease.  There is a large bifurcating first diagonal that has moderate diffuse disease, but no focal severe lesions.  The lad also has  diffuse moderate disease throughout , both proximal and distal to this first diagonal.   ? The RCA has moderate disease, without focal severe lesions.   LEFT VENTRICULAR FUNCTION   ? Left ventricular function is mildly reduced         Medications  Allergies   Current Outpatient Medications   Medication Sig Dispense Refill     aspirin 325 MG tablet [ASPIRIN 325 MG TABLET] Take 1 tablet by mouth daily.       atorvastatin (LIPITOR) 40 MG tablet [ATORVASTATIN (LIPITOR) 40 MG TABLET] TAKE 1 TABLET BY MOUTH AT BEDTIME 90 tablet 3     azelastine (ASTELIN) 137 mcg (0.1 %) nasal spray [AZELASTINE (ASTELIN) 137 MCG (0.1 %) NASAL SPRAY] 2 sprays in each nostril up to 3 times a day as needed for runny nose. 30 mL 12     blood glucose test (GLUCOSE BLOOD) strips [BLOOD GLUCOSE TEST (GLUCOSE BLOOD) STRIPS] USE TO TEST BLOOD SUGAR 1-2 TIMES DAILY       carvedilol (COREG) 3.125 MG tablet Take 1 tablet (3.125 mg) by mouth 2 times daily (with meals) 180 tablet 2     febuxostat (ULORIC) 40 MG TABS tablet Take 1 tablet (40 mg) by mouth daily (Patient taking differently: Take 40 mg by mouth every other day) 90 tablet 3     glimepiride (AMARYL) 2 MG tablet [GLIMEPIRIDE (AMARYL) 2 MG TABLET] Take 2 tablets (4 mg) by mouth every morning with meal. 180 tablet 3     losartan (COZAAR) 50 MG tablet Take 1 tablet (50 mg) by mouth 2 times daily 180 tablet 1     NIFEdipine ER OSMOTIC (PROCARDIA XL) 30 MG 24 hr tablet Take 1 tablet (30 mg) by mouth daily Dose reduction to once a day 90 tablet 3     nitroGLYcerin (NITRODUR) 0.1 MG/HR 24 hr patch Place 1 patch onto the skin daily Remove patch at bedtime. 30 patch 11     nitroGLYcerin (NITROSTAT) 0.4 MG sublingual tablet [NITROGLYCERIN (NITROSTAT) 0.4 MG SL TABLET] DISSOLVE ONE TABLET UNDER THE TONGUE EVERY 5 MINUTES AS NEEDED FOR CHEST PAIN.  DO NOT EXCEED A TOTAL OF 3 DOSES IN 15 MINUTES 25 tablet 2     semaglutide (OZEMPIC, 0.25 OR 0.5 MG/DOSE,) 2 MG/1.5ML SOPN pen Inject 0.5 mg Subcutaneous once  a week 4.5 mL 4      Allergies   Allergen Reactions     Allopurinol Other (See Comments)     Other reaction(s): Chest Tightness, Gave pt pains in chest like a heart attack, Pain        Physical Examination Review of Systems   Vitals: /58 (BP Location: Left arm, Patient Position: Sitting, Cuff Size: Adult Regular)   Pulse 66   Resp 16   Wt 110.7 kg (244 lb)   BMI 34.03 kg/m    BMI= Body mass index is 34.03 kg/m .  Wt Readings from Last 3 Encounters:   06/21/22 110.7 kg (244 lb)   06/08/22 111.6 kg (246 lb)   08/19/21 110.2 kg (243 lb)       General Appearance:   Pleasant male, appears stated age. no acute distress, overweight body habitus   ENT/Mouth: membranes moist, no apparent gingival bleeding.      EYES:  no scleral icterus, normal conjunctivae   Neck: no carotid bruits. supple   Respiratory:   lungs are clear to auscultation, no rales or wheezing, equal chest wall expansion    Cardiovascular:   Regular rhythm, normal rate. Normal first and second heart sounds with no murmurs, rubs, or gallops; the carotid, radial and posterior tibial pulses are intact, Jugular venous pressure normal, no edema bilaterally    Abdomen/GI:  Soft, non-tender   Extremities: no cyanosis or clubbing   Skin: no xanthelasma, warm.    Heme/lymph/ Immunology No apparent bleeding noted.   Neurologic: Alert and oriented. normal gait, no tremors   Psychiatric: Pleasant, calm, appropriate affect.         Please refer above for cardiac ROS details.       Past History   Past Medical History: History reviewed. No pertinent past medical history.     Past Surgical History:   Past Surgical History:   Procedure Laterality Date     ESOPHAGOSCOPY, GASTROSCOPY, DUODENOSCOPY (EGD), COMBINED  05/05/2011        Family History:   Family History   Problem Relation Age of Onset     Colon Cancer Mother         His mother passed away at age 64 due to colon cancer.     Acute Myocardial Infarction Father 42.00     Hypertension Father      Heart Failure  Father         His father passed away at age 74 due to congestive heart failure.     Hypertension Sister      Hypertension Brother      Hypertension Brother         Social History:   Social History     Socioeconomic History     Marital status:      Spouse name: Not on file     Number of children: Not on file     Years of education: Not on file     Highest education level: Not on file   Occupational History     Not on file   Tobacco Use     Smoking status: Former Smoker     Packs/day: 1.50     Years: 15.00     Pack years: 22.50     Types: Cigarettes     Quit date: 1981     Years since quittin.4     Smokeless tobacco: Never Used   Substance and Sexual Activity     Alcohol use: Yes     Alcohol/week: 14.0 standard drinks     Drug use: Not on file     Sexual activity: Not on file   Other Topics Concern     Not on file   Social History Narrative     Not on file     Social Determinants of Health     Financial Resource Strain: Not on file   Food Insecurity: Not on file   Transportation Needs: Not on file   Physical Activity: Not on file   Stress: Not on file   Social Connections: Not on file   Intimate Partner Violence: Not on file   Housing Stability: Not on file            Lab Results    Chemistry/lipid CBC Cardiac Enzymes/BNP/TSH/INR   Lab Results   Component Value Date    CHOL 148 2022    HDL 31 (L) 2022    TRIG 311 (H) 2022    BUN 33 (H) 2022     2022    CO2 22 2022    Lab Results   Component Value Date    WBC 6.9 2022    HGB 12.7 (L) 2022    HCT 38.3 (L) 2022    MCV 94 2022     2022    Lab Results   Component Value Date    TSH 3.14 2021                Thank you for allowing me to participate in the care of your patient.      Sincerely,     Jaylen Purdy MD     [facility]  cc:   No referring provider defined for this encounter.

## 2022-06-21 NOTE — PATIENT INSTRUCTIONS
It was a pleasure to meet with you today.      Below is a summary of your visit.   Start using a nitroglycerine patch. Apply one patch to your skin in the morning and remove at bedtime. This will help treat your anginal chest pain.  Decrease losartan to a 1/2 tablet in the morning and a full tablet in the evening.  Continue other medications without changes.  Follow up with me in a year or sooner if needed.       Please do not hesitate to call the [facility] Care clinic with any questions or concerns at [phone].     Sincerely,

## 2022-06-21 NOTE — PROGRESS NOTES
[facility] HE[facility]   [address], [place] [address]   [email or web address]   OFFICE: [phone]     CARDIOLOGY CLINIC NOTE     Thank you, Dr. Ma, Dimitri ISRA, for asking the [facility] Heart Care team to see Mr. Yohan Hayward to Follow Up (CAD)        Assessment/Recommendations   Assessment:    1. Coronary artery disease - with chronic stable class II angina. Known small vessel disease not amenable to revascularization. He previously tried Imdur with improvement in symptoms, but the effects of the medication wore off by early afternoon so this wasn't continued.  2. Hypertension - with low diastolic pressures and orthostatic light headedness/presyncope after AM medications.  3. CKD stage IIIb  4. Hyperlipidemia - on appropriate statin  5. DMII - managed by Dr. Ma.    Plan:  1. Decrease losartan to 25 mg in the AM and 50 mg at bedtime  2. Start nitroglycerine patch 0.1 mg daily. Remove at bedtime.  3. Continue other medications without changes.  4. Follow up in 1 year or sooner if needed.         History of Present Illness   Mr. Yohan Hayward is a 81 year old male with a significant past history of coronary artery disease, hypertension, hyperlipidemia, and DMII who presents for follow-up.     had his first coronary angiogram in 1994 that revealed single-vessel disease not amenable to revascularization.  He subsequently underwent another coronary angiogram in 2013 that showed moderate diffuse coronary disease with significant stenosis in the ostium of a small circumflex coronary artery that was not amenable to revascularization.  He had moderate diffuse disease elsewhere.    Mr. Hayward is a new patient to me and was last seen by Dr. Duane Purdy in September 2019.  Recently he reports that he has chronic stable anginal symptoms with primarily with walking up hills.  Symptoms are relieved with rest and occasionally nitroglycerin.  He also  endorses significant orthostatic lightheadedness and presyncope that occurs after taking his morning medications.  He had already had his nifedipine decreased over the winter with some improvement in his symptoms.     Other than noted above, Mr. Hayward denies any chest pain/pressure/tightness, shortness of breath at rest or with exertion, light headedness/dizziness, pre-syncope, syncope, lower extremity swelling, palpitations, paroxysmal nocturnal dyspnea (PND), or orthopnea.     Cardiac Problems and Cardiac Diagnostics     Most Recent Cardiac testing:    ECHO (report reviewed):   TTE 1/26/14  CONCLUSION:   1. Normal LV size and function, EF 60% to 65%.   2. Grade 1 diastolic dysfunction.   3. Normal RV size and function.   4. Sclerosis of the mitral and aortic valve without significant stenosis.   5. Negative bubble study with no evidence of right to left shunting.     Lexiscan NM Stress test: dated 9/16/19 revealed     There is no prior study available.    There are occasional premature ventricular contractions noted throughout the study.    The left ventricular ejection fraction is 67%.    The pharmacologic nuclear stress test is negative for inducible myocardial ischemia or infarction.    Cardiac cath: from 5/30/13 demonstrated   DIAGNOSTIC - CORONARY   ? Moderate diffuse corornary artery diseaese in a co-dominant system   ? The Circumflex is relatively small in size.  there is a significant lesion in the origin of this small circumflex that appears not to have changed in 20 years.  This is likely the cause of his abnormal stress test.   ? The LAD has moderate disease.  There is a large bifurcating first diagonal that has moderate diffuse disease, but no focal severe lesions.  The lad also has diffuse moderate disease throughout , both proximal and distal to this first diagonal.   ? The RCA has moderate disease, without focal severe lesions.   LEFT VENTRICULAR FUNCTION   ? Left ventricular function is mildly  reduced         Medications  Allergies   Current Outpatient Medications   Medication Sig Dispense Refill     aspirin 325 MG tablet [ASPIRIN 325 MG TABLET] Take 1 tablet by mouth daily.       atorvastatin (LIPITOR) 40 MG tablet [ATORVASTATIN (LIPITOR) 40 MG TABLET] TAKE 1 TABLET BY MOUTH AT BEDTIME 90 tablet 3     azelastine (ASTELIN) 137 mcg (0.1 %) nasal spray [AZELASTINE (ASTELIN) 137 MCG (0.1 %) NASAL SPRAY] 2 sprays in each nostril up to 3 times a day as needed for runny nose. 30 mL 12     blood glucose test (GLUCOSE BLOOD) strips [BLOOD GLUCOSE TEST (GLUCOSE BLOOD) STRIPS] USE TO TEST BLOOD SUGAR 1-2 TIMES DAILY       carvedilol (COREG) 3.125 MG tablet Take 1 tablet (3.125 mg) by mouth 2 times daily (with meals) 180 tablet 2     febuxostat (ULORIC) 40 MG TABS tablet Take 1 tablet (40 mg) by mouth daily (Patient taking differently: Take 40 mg by mouth every other day) 90 tablet 3     glimepiride (AMARYL) 2 MG tablet [GLIMEPIRIDE (AMARYL) 2 MG TABLET] Take 2 tablets (4 mg) by mouth every morning with meal. 180 tablet 3     losartan (COZAAR) 50 MG tablet Take 1 tablet (50 mg) by mouth 2 times daily 180 tablet 1     NIFEdipine ER OSMOTIC (PROCARDIA XL) 30 MG 24 hr tablet Take 1 tablet (30 mg) by mouth daily Dose reduction to once a day 90 tablet 3     nitroGLYcerin (NITRODUR) 0.1 MG/HR 24 hr patch Place 1 patch onto the skin daily Remove patch at bedtime. 30 patch 11     nitroGLYcerin (NITROSTAT) 0.4 MG sublingual tablet [NITROGLYCERIN (NITROSTAT) 0.4 MG SL TABLET] DISSOLVE ONE TABLET UNDER THE TONGUE EVERY 5 MINUTES AS NEEDED FOR CHEST PAIN.  DO NOT EXCEED A TOTAL OF 3 DOSES IN 15 MINUTES 25 tablet 2     semaglutide (OZEMPIC, 0.25 OR 0.5 MG/DOSE,) 2 MG/1.5ML SOPN pen Inject 0.5 mg Subcutaneous once a week 4.5 mL 4      Allergies   Allergen Reactions     Allopurinol Other (See Comments)     Other reaction(s): Chest Tightness, Gave pt pains in chest like a heart attack, Pain        Physical Examination Review of  Systems   Vitals: /58 (BP Location: Left arm, Patient Position: Sitting, Cuff Size: Adult Regular)   Pulse 66   Resp 16   Wt 110.7 kg (244 lb)   BMI 34.03 kg/m    BMI= Body mass index is 34.03 kg/m .  Wt Readings from Last 3 Encounters:   06/21/22 110.7 kg (244 lb)   06/08/22 111.6 kg (246 lb)   08/19/21 110.2 kg (243 lb)       General Appearance:   Pleasant male, appears stated age. no acute distress, overweight body habitus   ENT/Mouth: membranes moist, no apparent gingival bleeding.      EYES:  no scleral icterus, normal conjunctivae   Neck: no carotid bruits. supple   Respiratory:   lungs are clear to auscultation, no rales or wheezing, equal chest wall expansion    Cardiovascular:   Regular rhythm, normal rate. Normal first and second heart sounds with no murmurs, rubs, or gallops; the carotid, radial and posterior tibial pulses are intact, Jugular venous pressure normal, no edema bilaterally    Abdomen/GI:  Soft, non-tender   Extremities: no cyanosis or clubbing   Skin: no xanthelasma, warm.    Heme/lymph/ Immunology No apparent bleeding noted.   Neurologic: Alert and oriented. normal gait, no tremors   Psychiatric: Pleasant, calm, appropriate affect.         Please refer above for cardiac ROS details.       Past History   Past Medical History: History reviewed. No pertinent past medical history.     Past Surgical History:   Past Surgical History:   Procedure Laterality Date     ESOPHAGOSCOPY, GASTROSCOPY, DUODENOSCOPY (EGD), COMBINED  05/05/2011        Family History:   Family History   Problem Relation Age of Onset     Colon Cancer Mother         His mother passed away at age 64 due to colon cancer.     Acute Myocardial Infarction Father 42.00     Hypertension Father      Heart Failure Father         His father passed away at age 74 due to congestive heart failure.     Hypertension Sister      Hypertension Brother      Hypertension Brother         Social History:   Social History     Socioeconomic  History     Marital status:      Spouse name: Not on file     Number of children: Not on file     Years of education: Not on file     Highest education level: Not on file   Occupational History     Not on file   Tobacco Use     Smoking status: Former Smoker     Packs/day: 1.50     Years: 15.00     Pack years: 22.50     Types: Cigarettes     Quit date: 1981     Years since quittin.4     Smokeless tobacco: Never Used   Substance and Sexual Activity     Alcohol use: Yes     Alcohol/week: 14.0 standard drinks     Drug use: Not on file     Sexual activity: Not on file   Other Topics Concern     Not on file   Social History Narrative     Not on file     Social Determinants of Health     Financial Resource Strain: Not on file   Food Insecurity: Not on file   Transportation Needs: Not on file   Physical Activity: Not on file   Stress: Not on file   Social Connections: Not on file   Intimate Partner Violence: Not on file   Housing Stability: Not on file            Lab Results    Chemistry/lipid CBC Cardiac Enzymes/BNP/TSH/INR   Lab Results   Component Value Date    CHOL 148 2022    HDL 31 (L) 2022    TRIG 311 (H) 2022    BUN 33 (H) 2022     2022    CO2 22 2022    Lab Results   Component Value Date    WBC 6.9 2022    HGB 12.7 (L) 2022    HCT 38.3 (L) 2022    MCV 94 2022     2022    Lab Results   Component Value Date    TSH 3.14 2021

## 2022-06-27 ENCOUNTER — APPOINTMENT (OUTPATIENT)
Dept: CT IMAGING | Facility: CLINIC | Age: 82
End: 2022-06-27
Attending: STUDENT IN AN ORGANIZED HEALTH CARE EDUCATION/TRAINING PROGRAM
Payer: MEDICARE

## 2022-06-27 ENCOUNTER — APPOINTMENT (OUTPATIENT)
Dept: NEUROLOGY | Facility: CLINIC | Age: 82
End: 2022-06-27
Attending: STUDENT IN AN ORGANIZED HEALTH CARE EDUCATION/TRAINING PROGRAM
Payer: MEDICARE

## 2022-06-27 ENCOUNTER — APPOINTMENT (OUTPATIENT)
Dept: RADIOLOGY | Facility: CLINIC | Age: 82
End: 2022-06-27
Attending: STUDENT IN AN ORGANIZED HEALTH CARE EDUCATION/TRAINING PROGRAM
Payer: MEDICARE

## 2022-06-27 ENCOUNTER — HOSPITAL ENCOUNTER (OUTPATIENT)
Facility: CLINIC | Age: 82
Setting detail: OBSERVATION
Discharge: HOME | End: 2022-06-28
Attending: STUDENT IN AN ORGANIZED HEALTH CARE EDUCATION/TRAINING PROGRAM | Admitting: STUDENT IN AN ORGANIZED HEALTH CARE EDUCATION/TRAINING PROGRAM
Payer: MEDICARE

## 2022-06-27 ENCOUNTER — APPOINTMENT (OUTPATIENT)
Dept: MRI IMAGING | Facility: CLINIC | Age: 82
End: 2022-06-27
Attending: STUDENT IN AN ORGANIZED HEALTH CARE EDUCATION/TRAINING PROGRAM
Payer: MEDICARE

## 2022-06-27 ENCOUNTER — APPOINTMENT (OUTPATIENT)
Dept: CARDIOLOGY | Facility: CLINIC | Age: 82
End: 2022-06-27
Attending: HOSPITALIST
Payer: MEDICARE

## 2022-06-27 DIAGNOSIS — R42 LIGHTHEADEDNESS: Primary | ICD-10-CM

## 2022-06-27 DIAGNOSIS — R55 SYNCOPE AND COLLAPSE: ICD-10-CM

## 2022-06-27 LAB
ALBUMIN SERPL-MCNC: 3.8 G/DL (ref 3.5–5)
ALP SERPL-CCNC: 76 U/L (ref 45–120)
ALT SERPL W P-5'-P-CCNC: 35 U/L (ref 0–45)
ANION GAP SERPL CALCULATED.3IONS-SCNC: 10 MMOL/L (ref 5–18)
AST SERPL W P-5'-P-CCNC: 21 U/L (ref 0–40)
ATRIAL RATE - MUSE: 69 BPM
BASOPHILS # BLD AUTO: 0.1 10E3/UL (ref 0–0.2)
BASOPHILS NFR BLD AUTO: 1 %
BILIRUB SERPL-MCNC: 0.6 MG/DL (ref 0–1)
BUN SERPL-MCNC: 32 MG/DL (ref 8–28)
CALCIUM SERPL-MCNC: 9.3 MG/DL (ref 8.5–10.5)
CHLORIDE BLD-SCNC: 108 MMOL/L (ref 98–107)
CO2 SERPL-SCNC: 23 MMOL/L (ref 22–31)
CREAT SERPL-MCNC: 2.38 MG/DL (ref 0.7–1.3)
DIASTOLIC BLOOD PRESSURE - MUSE: 74 MMHG
EOSINOPHIL # BLD AUTO: 0.2 10E3/UL (ref 0–0.7)
EOSINOPHIL NFR BLD AUTO: 3 %
ERYTHROCYTE [DISTWIDTH] IN BLOOD BY AUTOMATED COUNT: 13.2 % (ref 10–15)
GFR SERPL CREATININE-BSD FRML MDRD: 27 ML/MIN/1.73M2
GLUCOSE BLD-MCNC: 252 MG/DL (ref 70–125)
GLUCOSE BLDC GLUCOMTR-MCNC: 174 MG/DL (ref 70–99)
GLUCOSE BLDC GLUCOMTR-MCNC: 194 MG/DL (ref 70–99)
HCT VFR BLD AUTO: 37.6 % (ref 40–53)
HGB BLD-MCNC: 12.7 G/DL (ref 13.3–17.7)
IMM GRANULOCYTES # BLD: 0.1 10E3/UL
IMM GRANULOCYTES NFR BLD: 1 %
INTERPRETATION ECG - MUSE: NORMAL
LVEF ECHO: NORMAL
LYMPHOCYTES # BLD AUTO: 1.2 10E3/UL (ref 0.8–5.3)
LYMPHOCYTES NFR BLD AUTO: 17 %
MAGNESIUM SERPL-MCNC: 2 MG/DL (ref 1.8–2.6)
MCH RBC QN AUTO: 31.2 PG (ref 26.5–33)
MCHC RBC AUTO-ENTMCNC: 33.8 G/DL (ref 31.5–36.5)
MCV RBC AUTO: 92 FL (ref 78–100)
MONOCYTES # BLD AUTO: 0.5 10E3/UL (ref 0–1.3)
MONOCYTES NFR BLD AUTO: 7 %
NEUTROPHILS # BLD AUTO: 5.2 10E3/UL (ref 1.6–8.3)
NEUTROPHILS NFR BLD AUTO: 71 %
NRBC # BLD AUTO: 0 10E3/UL
NRBC BLD AUTO-RTO: 0 /100
P AXIS - MUSE: 72 DEGREES
PLATELET # BLD AUTO: 222 10E3/UL (ref 150–450)
POTASSIUM BLD-SCNC: 4.2 MMOL/L (ref 3.5–5)
PR INTERVAL - MUSE: 210 MS
PROT SERPL-MCNC: 6.8 G/DL (ref 6–8)
QRS DURATION - MUSE: 90 MS
QT - MUSE: 402 MS
QTC - MUSE: 430 MS
R AXIS - MUSE: -24 DEGREES
RBC # BLD AUTO: 4.07 10E6/UL (ref 4.4–5.9)
SARS-COV-2 RNA RESP QL NAA+PROBE: NEGATIVE
SODIUM SERPL-SCNC: 141 MMOL/L (ref 136–145)
SYSTOLIC BLOOD PRESSURE - MUSE: 172 MMHG
T AXIS - MUSE: 19 DEGREES
TROPONIN I SERPL-MCNC: <0.01 NG/ML (ref 0–0.29)
TROPONIN T BLD-MCNC: 0 UG/L
VENTRICULAR RATE- MUSE: 69 BPM
WBC # BLD AUTO: 7.3 10E3/UL (ref 4–11)

## 2022-06-27 PROCEDURE — 250N000013 HC RX MED GY IP 250 OP 250 PS 637: Performed by: HOSPITALIST

## 2022-06-27 PROCEDURE — 96360 HYDRATION IV INFUSION INIT: CPT | Mod: XU

## 2022-06-27 PROCEDURE — 85025 COMPLETE CBC W/AUTO DIFF WBC: CPT | Performed by: STUDENT IN AN ORGANIZED HEALTH CARE EDUCATION/TRAINING PROGRAM

## 2022-06-27 PROCEDURE — 84484 ASSAY OF TROPONIN QUANT: CPT | Performed by: STUDENT IN AN ORGANIZED HEALTH CARE EDUCATION/TRAINING PROGRAM

## 2022-06-27 PROCEDURE — G0378 HOSPITAL OBSERVATION PER HR: HCPCS

## 2022-06-27 PROCEDURE — 83735 ASSAY OF MAGNESIUM: CPT | Performed by: STUDENT IN AN ORGANIZED HEALTH CARE EDUCATION/TRAINING PROGRAM

## 2022-06-27 PROCEDURE — U0003 INFECTIOUS AGENT DETECTION BY NUCLEIC ACID (DNA OR RNA); SEVERE ACUTE RESPIRATORY SYNDROME CORONAVIRUS 2 (SARS-COV-2) (CORONAVIRUS DISEASE [COVID-19]), AMPLIFIED PROBE TECHNIQUE, MAKING USE OF HIGH THROUGHPUT TECHNOLOGIES AS DESCRIBED BY CMS-2020-01-R: HCPCS | Performed by: STUDENT IN AN ORGANIZED HEALTH CARE EDUCATION/TRAINING PROGRAM

## 2022-06-27 PROCEDURE — 96361 HYDRATE IV INFUSION ADD-ON: CPT | Mod: XU

## 2022-06-27 PROCEDURE — A9585 GADOBUTROL INJECTION: HCPCS | Performed by: HOSPITALIST

## 2022-06-27 PROCEDURE — 71046 X-RAY EXAM CHEST 2 VIEWS: CPT

## 2022-06-27 PROCEDURE — 93306 TTE W/DOPPLER COMPLETE: CPT | Mod: 26 | Performed by: INTERNAL MEDICINE

## 2022-06-27 PROCEDURE — 12002 RPR S/N/AX/GEN/TRNK2.6-7.5CM: CPT

## 2022-06-27 PROCEDURE — 999N000208 ECHOCARDIOGRAM COMPLETE

## 2022-06-27 PROCEDURE — G1010 CDSM STANSON: HCPCS

## 2022-06-27 PROCEDURE — 96372 THER/PROPH/DIAG INJ SC/IM: CPT | Mod: XU | Performed by: HOSPITALIST

## 2022-06-27 PROCEDURE — 258N000003 HC RX IP 258 OP 636: Performed by: HOSPITALIST

## 2022-06-27 PROCEDURE — 99218 PR INITIAL OBSERVATION CARE,LEVEL I: CPT | Performed by: HOSPITALIST

## 2022-06-27 PROCEDURE — 82962 GLUCOSE BLOOD TEST: CPT

## 2022-06-27 PROCEDURE — 95819 EEG AWAKE AND ASLEEP: CPT

## 2022-06-27 PROCEDURE — 250N000012 HC RX MED GY IP 250 OP 636 PS 637: Performed by: HOSPITALIST

## 2022-06-27 PROCEDURE — 255N000002 HC RX 255 OP 636: Performed by: HOSPITALIST

## 2022-06-27 PROCEDURE — 95816 EEG AWAKE AND DROWSY: CPT | Mod: 26 | Performed by: PSYCHIATRY & NEUROLOGY

## 2022-06-27 PROCEDURE — 82040 ASSAY OF SERUM ALBUMIN: CPT | Performed by: STUDENT IN AN ORGANIZED HEALTH CARE EDUCATION/TRAINING PROGRAM

## 2022-06-27 PROCEDURE — C9803 HOPD COVID-19 SPEC COLLECT: HCPCS

## 2022-06-27 PROCEDURE — 70551 MRI BRAIN STEM W/O DYE: CPT | Mod: MG

## 2022-06-27 PROCEDURE — 80053 COMPREHEN METABOLIC PANEL: CPT | Performed by: STUDENT IN AN ORGANIZED HEALTH CARE EDUCATION/TRAINING PROGRAM

## 2022-06-27 PROCEDURE — 93005 ELECTROCARDIOGRAM TRACING: CPT | Performed by: EMERGENCY MEDICINE

## 2022-06-27 PROCEDURE — 70553 MRI BRAIN STEM W/O & W/DYE: CPT | Mod: MG

## 2022-06-27 PROCEDURE — 36415 COLL VENOUS BLD VENIPUNCTURE: CPT | Performed by: STUDENT IN AN ORGANIZED HEALTH CARE EDUCATION/TRAINING PROGRAM

## 2022-06-27 PROCEDURE — 99285 EMERGENCY DEPT VISIT HI MDM: CPT | Mod: 25

## 2022-06-27 RX ORDER — AMOXICILLIN 250 MG
1 CAPSULE ORAL 2 TIMES DAILY PRN
Status: DISCONTINUED | OUTPATIENT
Start: 2022-06-27 | End: 2022-06-28 | Stop reason: HOSPADM

## 2022-06-27 RX ORDER — LOSARTAN POTASSIUM 25 MG/1
25 TABLET ORAL EVERY MORNING
Status: DISCONTINUED | OUTPATIENT
Start: 2022-06-28 | End: 2022-06-28 | Stop reason: HOSPADM

## 2022-06-27 RX ORDER — LOSARTAN POTASSIUM 50 MG/1
25 TABLET ORAL EVERY MORNING
COMMUNITY
End: 2022-07-11

## 2022-06-27 RX ORDER — ASPIRIN 325 MG
325 TABLET ORAL DAILY
Status: DISCONTINUED | OUTPATIENT
Start: 2022-06-27 | End: 2022-06-28 | Stop reason: HOSPADM

## 2022-06-27 RX ORDER — ONDANSETRON 4 MG/1
4 TABLET, ORALLY DISINTEGRATING ORAL EVERY 6 HOURS PRN
Status: DISCONTINUED | OUTPATIENT
Start: 2022-06-27 | End: 2022-06-28 | Stop reason: HOSPADM

## 2022-06-27 RX ORDER — NICOTINE POLACRILEX 4 MG
15-30 LOZENGE BUCCAL
Status: DISCONTINUED | OUTPATIENT
Start: 2022-06-27 | End: 2022-06-28 | Stop reason: HOSPADM

## 2022-06-27 RX ORDER — GADOBUTROL 604.72 MG/ML
0.1 INJECTION INTRAVENOUS ONCE
Status: COMPLETED | OUTPATIENT
Start: 2022-06-27 | End: 2022-06-27

## 2022-06-27 RX ORDER — AMOXICILLIN 250 MG
2 CAPSULE ORAL 2 TIMES DAILY PRN
Status: DISCONTINUED | OUTPATIENT
Start: 2022-06-27 | End: 2022-06-28 | Stop reason: HOSPADM

## 2022-06-27 RX ORDER — MULTIVITAMIN,THERAPEUTIC
1 TABLET ORAL DAILY
COMMUNITY

## 2022-06-27 RX ORDER — ONDANSETRON 2 MG/ML
4 INJECTION INTRAMUSCULAR; INTRAVENOUS EVERY 6 HOURS PRN
Status: DISCONTINUED | OUTPATIENT
Start: 2022-06-27 | End: 2022-06-28 | Stop reason: HOSPADM

## 2022-06-27 RX ORDER — NITROGLYCERIN 0.4 MG/1
0.4 TABLET SUBLINGUAL EVERY 5 MIN PRN
Status: DISCONTINUED | OUTPATIENT
Start: 2022-06-27 | End: 2022-06-28 | Stop reason: HOSPADM

## 2022-06-27 RX ORDER — SODIUM CHLORIDE 9 MG/ML
INJECTION, SOLUTION INTRAVENOUS CONTINUOUS
Status: DISCONTINUED | OUTPATIENT
Start: 2022-06-27 | End: 2022-06-28

## 2022-06-27 RX ORDER — FEBUXOSTAT 40 MG/1
40 TABLET, FILM COATED ORAL EVERY OTHER DAY
Status: DISCONTINUED | OUTPATIENT
Start: 2022-06-27 | End: 2022-06-28 | Stop reason: HOSPADM

## 2022-06-27 RX ORDER — ATORVASTATIN CALCIUM 40 MG/1
40 TABLET, FILM COATED ORAL EVERY EVENING
Status: DISCONTINUED | OUTPATIENT
Start: 2022-06-27 | End: 2022-06-28 | Stop reason: HOSPADM

## 2022-06-27 RX ORDER — CARVEDILOL 3.12 MG/1
3.12 TABLET ORAL 2 TIMES DAILY WITH MEALS
Status: DISCONTINUED | OUTPATIENT
Start: 2022-06-27 | End: 2022-06-28 | Stop reason: HOSPADM

## 2022-06-27 RX ORDER — NIFEDIPINE 30 MG
30 TABLET, EXTENDED RELEASE ORAL DAILY
Status: DISCONTINUED | OUTPATIENT
Start: 2022-06-27 | End: 2022-06-28 | Stop reason: HOSPADM

## 2022-06-27 RX ORDER — DEXTROSE MONOHYDRATE 25 G/50ML
25-50 INJECTION, SOLUTION INTRAVENOUS
Status: DISCONTINUED | OUTPATIENT
Start: 2022-06-27 | End: 2022-06-28 | Stop reason: HOSPADM

## 2022-06-27 RX ORDER — LOSARTAN POTASSIUM 50 MG/1
50 TABLET ORAL EVERY EVENING
COMMUNITY
End: 2022-07-11

## 2022-06-27 RX ADMIN — CARVEDILOL 3.12 MG: 3.12 TABLET, FILM COATED ORAL at 17:05

## 2022-06-27 RX ADMIN — ATORVASTATIN CALCIUM 40 MG: 40 TABLET, FILM COATED ORAL at 21:41

## 2022-06-27 RX ADMIN — INSULIN ASPART 1 UNITS: 100 INJECTION, SOLUTION INTRAVENOUS; SUBCUTANEOUS at 17:55

## 2022-06-27 RX ADMIN — NIFEDIPINE 30 MG: 30 TABLET, EXTENDED RELEASE ORAL at 17:06

## 2022-06-27 RX ADMIN — FEBUXOSTAT 40 MG: 40 TABLET ORAL at 17:06

## 2022-06-27 RX ADMIN — ASPIRIN 325 MG ORAL TABLET 325 MG: 325 PILL ORAL at 17:06

## 2022-06-27 RX ADMIN — PERFLUTREN 3 ML: 6.52 INJECTION, SUSPENSION INTRAVENOUS at 14:40

## 2022-06-27 RX ADMIN — SODIUM CHLORIDE: 9 INJECTION, SOLUTION INTRAVENOUS at 16:28

## 2022-06-27 RX ADMIN — GADOBUTROL 10 ML: 604.72 INJECTION INTRAVENOUS at 15:47

## 2022-06-27 ASSESSMENT — ACTIVITIES OF DAILY LIVING (ADL): DEPENDENT_IADLS:: INDEPENDENT

## 2022-06-27 NOTE — ED NOTES
Bed: WWED-02  Expected date:   Expected time:   Means of arrival:   Comments:  Girard-Syncopal episode, fell into ladder, head lac not sure if on thinners

## 2022-06-27 NOTE — ED TRIAGE NOTES
Has been having 2 weeks of dizziness off and on. Usually happens in the morning. Had a recent med changed to his dosage of lorsartin to a lower dose. This am, became dizzy, sat down and had a syncopal episode. A friend at the site tried to help but when he did, the ladder he was holding hit patient in the left side of the head. Presently alert. Denies dizziness at present.  Does not take any thinners other than aspirin daily     Triage Assessment     Row Name 06/27/22 0867       Triage Assessment (Adult)    Airway WDL WDL       Respiratory WDL    Respiratory WDL WDL       Skin Circulation/Temperature WDL    Skin Circulation/Temperature WDL X  small Lac to left side of head bleeding controlled       Cardiac WDL    Cardiac WDL WDL       Peripheral/Neurovascular WDL    Peripheral Neurovascular WDL WDL       Cognitive/Neuro/Behavioral WDL    Cognitive/Neuro/Behavioral WDL WDL

## 2022-06-27 NOTE — PHARMACY-ADMISSION MEDICATION HISTORY
Pharmacy Note - Admission Medication History    Pertinent Provider Information: pt recently had losartan decreased from 50 mg BID to 25 mg QAM and 50 mg QPM.  Patient was also prescribed nitroglycerin patches, but has not started yet due to concerns about side effects.  Patient prescribed uloric 40 mg qday, but takes every OTHER day     ______________________________________________________________________    Prior To Admission (PTA) med list completed and updated in EMR.       PTA Med List   Medication Sig Last Dose     aspirin 325 MG tablet Take 325 mg by mouth daily 6/26/2022 at Unknown time     atorvastatin (LIPITOR) 40 MG tablet [ATORVASTATIN (LIPITOR) 40 MG TABLET] TAKE 1 TABLET BY MOUTH AT BEDTIME 6/26/2022 at Unknown time     azelastine (ASTELIN) 137 mcg (0.1 %) nasal spray [AZELASTINE (ASTELIN) 137 MCG (0.1 %) NASAL SPRAY] 2 sprays in each nostril up to 3 times a day as needed for runny nose. PRN at won't need     carvedilol (COREG) 3.125 MG tablet Take 1 tablet (3.125 mg) by mouth 2 times daily (with meals) 6/26/2022 at Unknown time     febuxostat (ULORIC) 40 MG TABS tablet Take 1 tablet (40 mg) by mouth daily (Patient taking differently: Take 40 mg by mouth every other day) 6/25/2022     glimepiride (AMARYL) 2 MG tablet [GLIMEPIRIDE (AMARYL) 2 MG TABLET] Take 2 tablets (4 mg) by mouth every morning with meal. 6/26/2022 at Unknown time     losartan (COZAAR) 50 MG tablet Take 25 mg by mouth every morning 6/26/2022 at Unknown time     losartan (COZAAR) 50 MG tablet Take 50 mg by mouth every evening 6/26/2022 at Unknown time     multivitamin, therapeutic (THERA-VIT) TABS tablet Take 1 tablet by mouth daily 6/26/2022 at Unknown time     NIFEdipine ER OSMOTIC (PROCARDIA XL) 30 MG 24 hr tablet Take 1 tablet (30 mg) by mouth daily Dose reduction to once a day 6/26/2022 at Unknown time     nitroGLYcerin (NITROSTAT) 0.4 MG sublingual tablet [NITROGLYCERIN (NITROSTAT) 0.4 MG SL TABLET] DISSOLVE ONE TABLET UNDER THE  TONGUE EVERY 5 MINUTES AS NEEDED FOR CHEST PAIN.  DO NOT EXCEED A TOTAL OF 3 DOSES IN 15 MINUTES 6/26/2022 at Unknown time     semaglutide (OZEMPIC, 0.25 OR 0.5 MG/DOSE,) 2 MG/1.5ML SOPN pen Inject 0.5 mg Subcutaneous once a week 6/20/2022       Information source(s): Patient and CareEverywhere/SureScripts  Method of interview communication: in-person    Summary of Changes to PTA Med List  New: multivitamin  Discontinued: none  Changed: updated losartan dosing    Patient was asked about OTC/herbal products specifically.  PTA med list reflects this.    In the past week, patient estimated taking medication this percent of the time:  greater than 90%.    Allergies were reviewed, assessed, and updated with the patient.      Patient does not anticipate needing any multi-use medications during admission.    The information provided in this note is only as accurate as the sources available at the time of the update(s).    Thank you for the opportunity to participate in the care of this patient.    Yasmine Marrero [facility]  6/27/2022 9:52 AM

## 2022-06-27 NOTE — ED PROVIDER NOTES
"  Emergency Department Encounter         FINAL IMPRESSION:  Syncope        ED COURSE AND MEDICAL DECISION MAKING       ED Course as of 06/28/22 1933 Mon Jun 27, 2022   0833 EKG is sinus rhythm, rate of 69, first-degree AV block is present which is new compared to December 2003.  No STEMI today.  No inversions or depressions    0842 PA Student, Papa Pereira met with the patient, obtained history, performed an initial exam, and discussed options and plan for diagnostics and treatment here in the ED.     0920 Patient is an obese 81-year-old Multimodal problems here after a syncopal event.  Patient has been followed by cardiology and has been shown to be orthostatic and has had multiple decreases in his medications with the past few months because of this however today stated he woke up feeling normal and did not take his home medications and had a syncopal event.  States he was walking up the stairs with a house  when \"all of a sudden\" he lost consciousness and the house  caught him just prior to him falling down the stairs.  In the action of trying to catch him the house  was holding a ladder which then hit the patient in the head.  He was caught and did not fall down the stairs however does have a laceration of the left side of his head.  Has no complaints on arrival here.  Heart and lungs are normal.  No appreciable murmur.  No paresthesias or weakness suggesting stroke.  He is alert and oriented x4.  Is a 4 cm laceration of the left side of his head.  Bleeding controlled.  No blood thinners.  No midline neck pain.  Plan for cardiopulmonary evaluation and most likely admission   1035 I performed a laceration repair on the patient.        PPE: Provider wore paper mask.             At the conclusion of the encounter I discussed the results of all the tests and the disposition. The questions were answered. The patient or family acknowledged understanding and was agreeable with the " "care plan.      MEDICATIONS GIVEN IN THE EMERGENCY DEPARTMENT:  Medications - No data to display    NEW PRESCRIPTIONS STARTED AT TODAY'S ED VISIT:  New Prescriptions    No medications on file       HPI     Patient information obtained from: The patient     Use of Interpretor: N/A    Yohan Hayward is a 81 year old male with a pertinent history of hyperlipidemia, TIA, and diabetes mellitus type II who presents to this ED via ambulance for evaluation of syncope.     The patient reports that he saw his cardiologist prior to the weekend and that he had his medication lowered. For a while now he has been dealing with feelings of light headedness in the morning and intermittent dizzy spells throughout the day. This morning he had a more sever dizzy spell when going up the stairs, he was caught by a passer-by who unfortunately dropped a ladder on the patient's head when reaching to grab the patient. He went down a few stairs in a sitting position, and was prevented from rolling down all the stairs by the passer-by. This happened at around 7:30 this morning and he arrived with EMS. Notes that his blood pressure was especially low this morning. The patient denies loss of consciousness, tinnitus, feelings of palpitations, hearing loss, low blood sugar, and room-spinning dizziness. The patient endorses angina and takes nitroglycerin for it, high blood sugar (says it was 280 this morning), the feeling of a \"heavy neck and shoulder\" prior to dizzy spells and during these times he feels off balance.     Of note, he had a similar instance earlier this year where he did lose consciousness and sustained a head injury.     REVIEW OF SYSTEMS:  Review of Systems   Constitutional: Negative for fever, malaise  HEENT: Negative runny nose, sore throat, ear pain, neck pain  Respiratory: Negative for shortness of breath, cough, congestion  Cardiovascular: Negative for chest pain, leg edema  Gastrointestinal: Negative for abdominal " "distention, abdominal pain, constipation, vomiting, nausea, diarrhea  Genitourinary: Negative for dysuria and hematuria.   Integument: Negative for rash, skin breakdown  Neurological: Negative for paresthesias, weakness, headache. Positive for light headedness.  Musculoskeletal: Negative for joint pain, joint swelling  Skin: Positive for wound on left anterolateral scalp    All other systems reviewed and are negative.      MEDICAL HISTORY     History reviewed. No pertinent past medical history.    Past Surgical History:   Procedure Laterality Date     ESOPHAGOSCOPY, GASTROSCOPY, DUODENOSCOPY (EGD), COMBINED  2011       Social History     Tobacco Use     Smoking status: Former Smoker     Packs/day: 1.50     Years: 15.00     Pack years: 22.50     Types: Cigarettes     Quit date: 1981     Years since quittin.5     Smokeless tobacco: Never Used   Substance Use Topics     Alcohol use: Yes     Alcohol/week: 14.0 standard drinks       aspirin 325 MG tablet  atorvastatin (LIPITOR) 40 MG tablet  azelastine (ASTELIN) 137 mcg (0.1 %) nasal spray  carvedilol (COREG) 3.125 MG tablet  febuxostat (ULORIC) 40 MG TABS tablet  glimepiride (AMARYL) 2 MG tablet  losartan (COZAAR) 50 MG tablet  losartan (COZAAR) 50 MG tablet  multivitamin, therapeutic (THERA-VIT) TABS tablet  NIFEdipine ER OSMOTIC (PROCARDIA XL) 30 MG 24 hr tablet  nitroGLYcerin (NITROSTAT) 0.4 MG sublingual tablet  semaglutide (OZEMPIC, 0.25 OR 0.5 MG/DOSE,) 2 MG/1.5ML SOPN pen  blood glucose test (GLUCOSE BLOOD) strips  nitroGLYcerin (NITRODUR) 0.1 MG/HR 24 hr patch        PHYSICAL EXAM     BP (!) 172/74   Pulse 72   Temp 97.7  F (36.5  C) (Oral)   Resp 20   Ht 1.803 m (5' 11\")   Wt 107.6 kg (237 lb 3.2 oz)   SpO2 96%   BMI 33.08 kg/m        PHYSICAL EXAM:     General: Patient appears well, nontoxic, comfortable  HEENT: Moist mucous membranes, no tongue swelling.  No head trauma.  No midline neck pain.  Cardiovascular: Normal rate, normal rhythm, " no extremity edema.  No appreciable murmur.  Respiratory: No signs of respiratory distress, lungs are clear to auscultation bilaterally with no wheezes rhonchi or rales.  Abdominal: Soft, nontender, nondistended, no palpable masses, no guarding, no rebound  Musculoskeletal: Full range of motion of joints, no deformities appreciated.  Neurological: Alert and oriented, grossly neurologically intact.  Psychological: Normal affect and mood.  Integument: No rashes appreciated. 4 cm laceration on left side of head          RESULTS       Labs Ordered and Resulted from Time of ED Arrival to Time of ED Departure   COMPREHENSIVE METABOLIC PANEL - Abnormal       Result Value    Sodium 141      Potassium 4.2      Chloride 108 (*)     Carbon Dioxide (CO2) 23      Anion Gap 10      Urea Nitrogen 32 (*)     Creatinine 2.38 (*)     Calcium 9.3      Glucose 252 (*)     Alkaline Phosphatase 76      AST 21      ALT 35      Protein Total 6.8      Albumin 3.8      Bilirubin Total 0.6      GFR Estimate 27 (*)    CBC WITH PLATELETS AND DIFFERENTIAL - Abnormal    WBC Count 7.3      RBC Count 4.07 (*)     Hemoglobin 12.7 (*)     Hematocrit 37.6 (*)     MCV 92      MCH 31.2      MCHC 33.8      RDW 13.2      Platelet Count 222      % Neutrophils 71      % Lymphocytes 17      % Monocytes 7      % Eosinophils 3      % Basophils 1      % Immature Granulocytes 1      NRBCs per 100 WBC 0      Absolute Neutrophils 5.2      Absolute Lymphocytes 1.2      Absolute Monocytes 0.5      Absolute Eosinophils 0.2      Absolute Basophils 0.1      Absolute Immature Granulocytes 0.1      Absolute NRBCs 0.0     MAGNESIUM - Normal    Magnesium 2.0     TROPONIN I - Normal    Troponin I <0.01     ISTAT TROPONIN POCT - Normal    TROPPC POCT 0.00     COVID-19 VIRUS (CORONAVIRUS) BY PCR - Normal    SARS CoV2 PCR Negative         MR Brain w/o & w Contrast   Final Result   IMPRESSION:   1.  Homogeneously enhancing extra-axial lesion over the right frontal convexity,  most consistent with a meningioma.   2.  No acute infarct or acute intracranial.   3.  Mild age-related changes, as detailed above.      Echocardiogram Complete   Final Result      Chest XR,  PA & LAT   Final Result   IMPRESSION: A few small calcified left pleural plaques. Chest otherwise negative.      Head CT w/o contrast   Final Result   IMPRESSION:   1.  No CT evidence for acute intracranial process.   2.  Brain atrophy and presumed chronic microvascular ischemic changes as above.          PROCEDURES:  Procedures:  [facility]    -Laceration Repair    Date/Time: 6/27/2022 10:37 AM  Performed by: Wiley Sorenson DO  Authorized by: Wiley Sorenson DO     Risks, benefits and alternatives discussed.      ANESTHESIA (see MAR for exact dosages):     Anesthesia method:  Local infiltration    Local anesthetic:  Bupivacaine 0.25% WITH epi  LACERATION DETAILS     Location:  Scalp    Length (cm):  6    REPAIR TYPE:     Repair type:  Simple      EXPLORATION:     Wound exploration: wound explored through full range of motion and entire depth of wound probed and visualized      TREATMENT:     Area cleansed with:  Saline    Irrigation solution:  Sterile saline    Visualized foreign bodies/material removed: no      SKIN REPAIR     Repair method:  Sutures    Suture size:  5-0    Suture technique:  Simple interrupted    Number of sutures:  6    APPROXIMATION     Approximation:  Close    PROCEDURE    Patient Tolerance:  Patient tolerated the procedure well with no immediate complications       IAnushka am serving as a scribe to document services personally performed by Wiley Sorenson DO, based on my observations and the provider's statements to me.  I, Wiley Sorenson DO, attest that Anushka Mcdonald is acting in a scribe capacity, has observed my performance of the services and has documented them in accordance with my direction.    Wiley Sorenson DO  Emergency Medicine  [facility]  CAMPUS EMERGENCY ROOM     Ohl, Wiley Nuñez DO  06/28/22 1934

## 2022-06-27 NOTE — CONSULTS
"Care Management Initial Consult    General Information  Assessment completed with: Patient, Spouse or significant other,    Type of CM/SW Visit: Initial Assessment  Primary Care Provider verified and updated as needed: Yes   Readmission within the last 30 days: no previous admission in last 30 days   Reason for Consult: discharge planning, health care directive, transportation  Advance Care Planning: Advance Care Planning Reviewed: verified with patient        Communication Assessment  Patient's communication style: spoken language (English or Bilingual)        Cognitive  Cognitive/Neuro/Behavioral: WDL                      Living Environment:   People in home: spouse     Current living Arrangements: apartment, other (see comments) (\"Condo\".)      Able to return to prior arrangements: yes     Family/Social Support:  Care provided by: self, spouse/significant other  Provides care for: no one  Marital Status:   Wife  Rosaura PÉREZ       Description of Support System: Supportive, Involved    Support Assessment: Adequate family and caregiver support, Adequate social supports    Current Resources:   Patient receiving home care services: No  Community Resources: None  Equipment currently used at home: cane, straight, grab bar, tub/shower  Supplies currently used at home: Diabetic Supplies    Employment/Financial:  Employment Status: retired     Financial Concerns: No concerns identified   Referral to Financial Worker: No     Lifestyle & Psychosocial Needs:  Social Determinants of Health     Tobacco Use: Medium Risk     Smoking Tobacco Use: Former Smoker     Smokeless Tobacco Use: Never Used   Alcohol Use: Not on file   Financial Resource Strain: Not on file   Food Insecurity: Not on file   Transportation Needs: Not on file   Physical Activity: Not on file   Stress: Not on file   Social Connections: Not on file   Intimate Partner Violence: Not on file   Depression: Not at risk     PHQ-2 Score: 0   Housing Stability: Not " on file     Functional Status:  Prior to admission patient needed assistance:   Dependent ADLs:: Independent  Dependent IADLs:: Independent  Assessment of Functional Status: At functional baseline    Mental Health Status:  Mental Health Status: No Current Concerns       Chemical Dependency Status:  Chemical Dependency Status: No Current Concerns           Values/Beliefs:  Spiritual, Cultural Beliefs, Evangelical Practices, Values that affect care: no (None stated by pt.)             Additional Information:  Writer met with pt and his wife to provide BURROWS info, introduce Care Management service(CM), and obtain an initial assessment. Pt states being totally I/ADL independent at baseline while sharing a Condo with his wife. Cane use as needed. No services active or current concerns expressed.    81 year old male with PMH of hypertension, CAD, diabetes, CKD stage III, prior stroke and TIA, has been having episodes of lightheadedness that have been becoming more frequent in the last 2 weeks per his wife. This morning pt was walking up the stairs with a house  when all of a sudden he lost consciousness and almost fell down the stairs. Pt had his fall broke by  person. There was also a ladder involved that fell and hit pt in the head with a resulting laceration. EMS arrived and pt blood pressure was elevated. Head CT without acute finding, EKG sinus rhythm, patient had positive orthostatic vitals in the ED. Neurology consulted and MRI of the brain has been ordered, EEG as well. Patient denies any numbness or tingling change in vision or speech, no focal weakness, previously had a stroke with a right sided arm weakness that resolved now, had no similar symptoms like stroke before, he did not feel nauseated, his wife states that episode of lightheadedness has been coming more frequent in the last 2 weeks, he was seen by cardiology recently and he is known to have orthostatic vitals.    PT, OT,and NEURO  Consults pending. Anticipate return home with outpatient Cardiac follow-up and as needed care from wife without issue. Wife anticipated to provide transport at discharge without issue. CM to follow for changes in current anticipated discharge disposition and assist as needed.    Kingston Segura RN

## 2022-06-27 NOTE — ED NOTES
Blood collected from existing IV.10 ml of blood collected and wasted.  Blood collected for test and sent to the lab.  IV flushed with 10 ml of NS.  Aleyda Dennis RN 6/27/2022 8:52 AM

## 2022-06-27 NOTE — PROGRESS NOTES
Neurology Chart Check Progress Note    History provided based on discussion with the hospitalist/chart review    Case discussed with the hospitalist.  Patient admitted with spells of loss of consciousness.  There is some brief staring episode but no convulsive activity with the spells.  Extensive cardiac work-up has been done with no clear cause identified.  He is on blood pressure medications and has orthostatic hypotension.  Using these medications have not helped prevent the spells.    MRI brain                                                               IMPRESSION:  1.  Homogeneously enhancing extra-axial lesion over the right frontal convexity, most consistent with a meningioma.  2.  No acute infarct or acute intracranial.  3.  Mild age-related changes, as detailed above.    EEG  IMPRESSION/REPORT/PLAN  This is a normal EEG during wakefulness and drowsiness except mild generalized background dysrhythmia. Further clinical correlation is needed.     Please note that the absence of epileptiform abnormalities does not exclude the possibility of epilepsy in any patient.       Impression  1.  Spells of loss of consciousness-recurrent  2.  Meningioma    Recommendations:  1.  Complete cardiac work-up and if no clear cause for the spells is identified could consider trial of Keppra medication to see if that helps prevent the spells.  Keppra 500 mg dose twice daily would be appropriate.  Recheck level in 1 week and adjust accordingly    2.  Can follow-up in the neurology clinic with me on discharge as an outpatient.    3.  Meningioma is asymptomatic/incidental finding and can monitor.  No surgery is needed unless he has symptoms from the meningioma.    Please call if there are any further questions    Armin Wilkins MD  Neurologist  [facility]  Tel:- [phone]

## 2022-06-27 NOTE — H&P
[facility] MEDICINE ADMISSION HISTORY AND PHYSICAL   Date of Admission: 6/27/2022  Yohan Hayward, 1940, [id number]    Assessment and Plan:  -Lightheadedness, syncope  Differentials including orthostatic hypotension versus cardiogenic versus neurogenic  -Per patient wife is lightheadedness is becoming more frequent in the last 2 weeks,  Orthostatic vitals are positive, received IV fluid, will continue IV fluid hydration  -Cardiology evaluated the patient recently, reduce the dose of his losartan, and started him on nitroglycerin patch 0.1 mg daily,  -Neurology consulted a brain MRI has been ordered,pending now  Will order an echo for now, will admit for cardiac monitoring    Addendum  Brain MRI showing right frontal meningioma,  I talked to neurology over the phone Dr. Lewis who does not think this can contribute to his dizziness but if we concern about seizure may need to start him on antiepileptic, he will review his EEG and put recommendations,  I relayed the information to the patient,    History of coronary artery disease  Known coronary artery disease not amenable for revascularization,  Occasional chest discomfort, cardiology added nitroglycerin patch  He takes as needed nitro for chest discomfort  Had a stress test 3 years ago,    Type 2 diabetes  On oral medication at home, will hold for now and start him on  Insulin sliding scale    Acute kidney injury with CKD stage III  Creatinine 2.38 today,  Possible dehydration, prerenal start IV fluid and recheck kidney function      Hypertension  With orthostatic hypotension  Will resume medication with caution for now, will hold on nitroglycerin patch, will resume nifedipine losartan,    Hyperlipidemia  We will resume statin    Scalp laceration  Was repaired in the ER,  History of TIA and stroke  Without residual weakness, will resume aspirin 325    -COVID19 PCR status  Tested negative on admission, vaccinated and boosted  last dose was received on November 2021    -Anticoagulation   Enoxaparin (Lovenox) SQ    -FoleyNot present    Code Status: Full Code    Disposition: Observation     Chief Complaint  presyncope and lightheadedness     HISTORY     Yohan Hayward is a 81 year old male with PMH of   hypertension, CAD, diabetes, CKD stage III, prior stroke and TIA, has been having episodes of lightheadedness that becoming more frequent in the last 2 weeks per his wife, this morning he was walking up the stairs with a house  when all of a sudden he lost his consciousness, house  called him just prior to him falling down the stairs, he was holding a ladder which then hit the patient in the head, he was caught and did not fall down stairs, however he had a laceration on the left side of his head from the ladder hitting the patient head,, and he regained consciousness within a few seconds, EMS arrived, his blood pressure was elevated and he was orthostatic vitals positive, received IV fluids, in the ER he had 4 cm laceration on the left side of his head, that was sutured by ER provider, blood pressure was elevated, head CT without acute finding, EKG sinus rhythm, patient had positive orthostats again in the ER, neurology consulted and MRI of the brain has been ordered, EEG as well,    Patient denies any numbness or tingling change in vision or speech, no focal weakness, previously had a stroke with a right sided arm weakness that resolved now, had no similar symptoms like stroke before, he did not feel nauseated, his wife states that episode of lightheadedness has been coming more frequent in the last 2 weeks, he was seen by cardiology recently in the he is known to have orthostatic vitals positive and his dose of antihypertensive was lowered    -Denies abdominal pain, diarrhea, denies cough chest pain or shortness of breath leg swelling, no back pain,    Past Medical History     No past medical history on file.      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  IMO Update 10/11  Formatting of this note might be different from the original.  Colonoscopy 2005. Recommend 10 year follow up       Cough 10/19/2011     Priority: Medium     Atherosclerotic heart disease native coronary artery w/angina pectoris (H) 05/06/2011     Priority: Medium     Chronic LAD occlusion.         Hyperlipidemia 05/06/2011     Priority: Medium     IMO Update 10/11         Surgical History     Past Surgical History:   Procedure Laterality Date     ESOPHAGOSCOPY, GASTROSCOPY, DUODENOSCOPY (EGD), COMBINED  05/05/2011     Family History      Family History   Problem Relation Age of Onset     Colon Cancer Mother         His mother passed away at age 64 due to colon cancer.     Acute Myocardial Infarction Father 42.00     Hypertension Father      Heart Failure Father         His father passed away at age 74 due to congestive heart failure.     Hypertension Sister      Hypertension Brother      Hypertension  Brother       Social History      Social History     Tobacco Use     Smoking status: Former Smoker     Packs/day: 1.50     Years: 15.00     Pack years: 22.50     Types: Cigarettes     Quit date: 1981     Years since quittin.5     Smokeless tobacco: Never Used   Substance Use Topics     Alcohol use: Yes     Alcohol/week: 14.0 standard drinks      Allergies     Allergies   Allergen Reactions     Allopurinol Other (See Comments)     Other reaction(s): Chest Tightness, Gave pt pains in chest like a heart attack, Pain     Prior to Admission Medications      Prior to Admission Medications   Prescriptions Last Dose Informant Patient Reported? Taking?   NIFEdipine ER OSMOTIC (PROCARDIA XL) 30 MG 24 hr tablet 2022 at Unknown time  No Yes   Sig: Take 1 tablet (30 mg) by mouth daily Dose reduction to once a day   aspirin 325 MG tablet 2022 at Unknown time  Yes Yes   Sig: Take 325 mg by mouth daily   atorvastatin (LIPITOR) 40 MG tablet 2022 at Unknown time  No Yes   Sig: [ATORVASTATIN (LIPITOR) 40 MG TABLET] TAKE 1 TABLET BY MOUTH AT BEDTIME   azelastine (ASTELIN) 137 mcg (0.1 %) nasal spray PRN at won't need  No Yes   Sig: [AZELASTINE (ASTELIN) 137 MCG (0.1 %) NASAL SPRAY] 2 sprays in each nostril up to 3 times a day as needed for runny nose.   blood glucose test (GLUCOSE BLOOD) strips   Yes No   Sig: [BLOOD GLUCOSE TEST (GLUCOSE BLOOD) STRIPS] USE TO TEST BLOOD SUGAR 1-2 TIMES DAILY   carvedilol (COREG) 3.125 MG tablet 2022 at Unknown time  No Yes   Sig: Take 1 tablet (3.125 mg) by mouth 2 times daily (with meals)   febuxostat (ULORIC) 40 MG TABS tablet 2022  No Yes   Sig: Take 1 tablet (40 mg) by mouth daily   Patient taking differently: Take 40 mg by mouth every other day   glimepiride (AMARYL) 2 MG tablet 2022 at Unknown time  No Yes   Sig: [GLIMEPIRIDE (AMARYL) 2 MG TABLET] Take 2 tablets (4 mg) by mouth every morning with meal.   losartan (COZAAR) 50 MG tablet 2022 at Unknown  time  Yes Yes   Sig: Take 25 mg by mouth every morning   losartan (COZAAR) 50 MG tablet 6/26/2022 at Unknown time  Yes Yes   Sig: Take 50 mg by mouth every evening   multivitamin, therapeutic (THERA-VIT) TABS tablet 6/26/2022 at Unknown time  Yes Yes   Sig: Take 1 tablet by mouth daily   nitroGLYcerin (NITRODUR) 0.1 MG/HR 24 hr patch has NOT started yet  No No   Sig: Place 1 patch onto the skin daily Remove patch at bedtime.   nitroGLYcerin (NITROSTAT) 0.4 MG sublingual tablet 6/26/2022 at Unknown time  No Yes   Sig: [NITROGLYCERIN (NITROSTAT) 0.4 MG SL TABLET] DISSOLVE ONE TABLET UNDER THE TONGUE EVERY 5 MINUTES AS NEEDED FOR CHEST PAIN.  DO NOT EXCEED A TOTAL OF 3 DOSES IN 15 MINUTES   semaglutide (OZEMPIC, 0.25 OR 0.5 MG/DOSE,) 2 MG/1.5ML SOPN pen 6/20/2022  No Yes   Sig: Inject 0.5 mg Subcutaneous once a week      Facility-Administered Medications: None          PHYSICAL EXAMINATION     Vitals      Temp:  [97.7  F (36.5  C)] 97.7  F (36.5  C)  Pulse:  [65-72] 70  Resp:  [18-21] 18  BP: (140-175)/(65-75) 175/75  SpO2:  [95 %-98 %] 97 %    Examination     Constitutional: awake, alert, cooperative, no apparent distress, and appears stated age    Respiratory: No increased work of breathing, good air exchange, clear to auscultation bilaterally, no crackles or wheezing  Cardiovascular: Normal apical impulse, regular rate and rhythm, normal S1 and S2, no S3 or S4, and no murmur noted  GI: No scars, normal bowel sounds, soft, non-distended, non-tender, no masses palpated, no hepatosplenomegally  Skin: normal skin color, texture, turgor, no redness, warmth, or swelling, and no rashes  Musculoskeletal: There is no redness, warmth, or swelling of the joints.  Full range of motion noted.  Motor strength is 5 out of 5 all extremities bilaterally.  Tone is normal. no lower extremity pitting edema present  Neurologic: Cranial nerves II-XII are grossly intact.i speech normal,    Pertinent Lab     Results for orders placed or  performed during the hospital encounter of 06/27/22 (from the past 24 hour(s))   ECG 12-LEAD WITH MUSE (LHE)   Result Value Ref Range    Systolic Blood Pressure 172 mmHg    Diastolic Blood Pressure 74 mmHg    Ventricular Rate 69 BPM    Atrial Rate 69 BPM    NY Interval 210 ms    QRS Duration 90 ms     ms    QTc 430 ms    P Axis 72 degrees    R AXIS -24 degrees    T Axis 19 degrees    Interpretation ECG       Sinus rhythm with 1st degree A-V block with Premature atrial complexes  Cannot rule out Anterior infarct (cited on or before 27-JUN-2022)  Abnormal ECG  When compared with ECG of 27-JUN-2022 08:28,  Sinus rhythm has replaced Junctional rhythm  Confirmed by SEE ED PROVIDER NOTE FOR, ECG INTERPRETATION ([id number]),  ISAIAH QUISPE ([id number]) on 6/27/2022 8:33:28 AM     CBC with platelets differential    Narrative    The following orders were created for panel order CBC with platelets differential.  Procedure                               Abnormality         Status                     ---------                               -----------         ------                     CBC with platelets and d...[[id number]]  Abnormal            Final result                 Please view results for these tests on the individual orders.   Comprehensive metabolic panel   Result Value Ref Range    Sodium 141 136 - 145 mmol/L    Potassium 4.2 3.5 - 5.0 mmol/L    Chloride 108 (H) 98 - 107 mmol/L    Carbon Dioxide (CO2) 23 22 - 31 mmol/L    Anion Gap 10 5 - 18 mmol/L    Urea Nitrogen 32 (H) 8 - 28 mg/dL    Creatinine 2.38 (H) 0.70 - 1.30 mg/dL    Calcium 9.3 8.5 - 10.5 mg/dL    Glucose 252 (H) 70 - 125 mg/dL    Alkaline Phosphatase 76 45 - 120 U/L    AST 21 0 - 40 U/L    ALT 35 0 - 45 U/L    Protein Total 6.8 6.0 - 8.0 g/dL    Albumin 3.8 3.5 - 5.0 g/dL    Bilirubin Total 0.6 0.0 - 1.0 mg/dL    GFR Estimate 27 (L) >60 mL/min/1.73m2   Magnesium   Result Value Ref Range    Magnesium 2.0 1.8 - 2.6 mg/dL   Troponin I   Result Value Ref  Range    Troponin I <0.01 0.00 - 0.29 ng/mL   CBC with platelets and differential   Result Value Ref Range    WBC Count 7.3 4.0 - 11.0 10e3/uL    RBC Count 4.07 (L) 4.40 - 5.90 10e6/uL    Hemoglobin 12.7 (L) 13.3 - 17.7 g/dL    Hematocrit 37.6 (L) 40.0 - 53.0 %    MCV 92 78 - 100 fL    MCH 31.2 26.5 - 33.0 pg    MCHC 33.8 31.5 - 36.5 g/dL    RDW 13.2 10.0 - 15.0 %    Platelet Count 222 150 - 450 10e3/uL    % Neutrophils 71 %    % Lymphocytes 17 %    % Monocytes 7 %    % Eosinophils 3 %    % Basophils 1 %    % Immature Granulocytes 1 %    NRBCs per 100 WBC 0 <1 /100    Absolute Neutrophils 5.2 1.6 - 8.3 10e3/uL    Absolute Lymphocytes 1.2 0.8 - 5.3 10e3/uL    Absolute Monocytes 0.5 0.0 - 1.3 10e3/uL    Absolute Eosinophils 0.2 0.0 - 0.7 10e3/uL    Absolute Basophils 0.1 0.0 - 0.2 10e3/uL    Absolute Immature Granulocytes 0.1 <=0.4 10e3/uL    Absolute NRBCs 0.0 10e3/uL   iStat Troponin, POCT   Result Value Ref Range    TROPPC POCT 0.00 <=0.12 ug/L   Head CT w/o contrast    Narrative    EXAM: CT HEAD W/O CONTRAST  LOCATION: [facility]  DATE/TIME: 6/27/2022 9:13 AM    INDICATION: Syncope.  COMPARISON: None.  TECHNIQUE: Routine CT Head without IV contrast. Multiplanar reformats. Dose reduction techniques were used.    FINDINGS:  INTRACRANIAL CONTENTS: No intracranial hemorrhage, extraaxial collection, or mass effect.  No CT evidence of acute infarct. Mild presumed chronic small vessel ischemic changes. Mild generalized volume loss. No hydrocephalus.     VISUALIZED ORBITS/SINUSES/MASTOIDS: Prior bilateral cataract surgery. Visualized portions of the orbits are otherwise unremarkable. Minimal mucosal thickening in the maxillary sinuses. No middle ear or mastoid effusion.    BONES/SOFT TISSUES: No acute abnormality.      Impression    IMPRESSION:  1.  No CT evidence for acute intracranial process.  2.  Brain atrophy and presumed chronic microvascular ischemic changes as above.   Chest XR,  PA & LAT     Narrative    EXAM: XR CHEST 2 VW  LOCATION: [facility]  DATE/TIME: 6/27/2022 9:27 AM    INDICATION: syncope  COMPARISON: None.      Impression    IMPRESSION: A few small calcified left pleural plaques. Chest otherwise negative.   Asymptomatic COVID-19 Virus (Coronavirus) by PCR Nasopharyngeal    Specimen: Nasopharyngeal; Swab   Result Value Ref Range    SARS CoV2 PCR Negative Negative    Narrative    Testing was performed using the Xpert Xpress SARS-CoV-2 Assay on the   Cepheid Gene-Xpert Instrument Systems. Additional information about   this Emergency Use Authorization (EUA) assay can be found via the Lab   Guide. This test should be ordered for the detection of SARS-CoV-2 in   individuals who meet SARS-CoV-2 clinical and/or epidemiological   criteria. Test performance is unknown in asymptomatic patients. This   test is for in vitro diagnostic use under the FDA EUA for   laboratories certified under CLIA to perform high complexity testing.   This test has not been FDA cleared or approved. A negative result   does not rule out the presence of PCR inhibitors in the specimen or   target RNA in concentration below the limit of detection for the   assay. The possibility of a false negative should be considered if   the patient's recent exposure or clinical presentation suggests   COVID-19. This test was validated by the [facility]. This laboratory is certified under the Clinical Laboratory Improvement Amendments of 1988 (CLIA-88) as qualified to perform high complexity laboratory testing.       Results for orders placed or performed during the hospital encounter of 06/27/22 (from the past 24 hour(s))   ECG 12-LEAD WITH MUSE (LHE)   Result Value Ref Range    Systolic Blood Pressure 172 mmHg    Diastolic Blood Pressure 74 mmHg    Ventricular Rate 69 BPM    Atrial Rate 69 BPM    FL Interval 210 ms    QRS Duration 90 ms     ms    QTc 430 ms    P Axis  72 degrees    R AXIS -24 degrees    T Axis 19 degrees    Interpretation ECG       Sinus rhythm with 1st degree A-V block with Premature atrial complexes  Cannot rule out Anterior infarct (cited on or before 27-JUN-2022)  Abnormal ECG  When compared with ECG of 27-JUN-2022 08:28,  Sinus rhythm has replaced Junctional rhythm  Confirmed by SEE ED PROVIDER NOTE FOR, ECG INTERPRETATION ([id number]),  ISAIAH QUISPE ([id number]) on 6/27/2022 8:33:28 AM     CBC with platelets differential    Narrative    The following orders were created for panel order CBC with platelets differential.  Procedure                               Abnormality         Status                     ---------                               -----------         ------                     CBC with platelets and d...[[id number]]  Abnormal            Final result                 Please view results for these tests on the individual orders.   Comprehensive metabolic panel   Result Value Ref Range    Sodium 141 136 - 145 mmol/L    Potassium 4.2 3.5 - 5.0 mmol/L    Chloride 108 (H) 98 - 107 mmol/L    Carbon Dioxide (CO2) 23 22 - 31 mmol/L    Anion Gap 10 5 - 18 mmol/L    Urea Nitrogen 32 (H) 8 - 28 mg/dL    Creatinine 2.38 (H) 0.70 - 1.30 mg/dL    Calcium 9.3 8.5 - 10.5 mg/dL    Glucose 252 (H) 70 - 125 mg/dL    Alkaline Phosphatase 76 45 - 120 U/L    AST 21 0 - 40 U/L    ALT 35 0 - 45 U/L    Protein Total 6.8 6.0 - 8.0 g/dL    Albumin 3.8 3.5 - 5.0 g/dL    Bilirubin Total 0.6 0.0 - 1.0 mg/dL    GFR Estimate 27 (L) >60 mL/min/1.73m2   Magnesium   Result Value Ref Range    Magnesium 2.0 1.8 - 2.6 mg/dL   Troponin I   Result Value Ref Range    Troponin I <0.01 0.00 - 0.29 ng/mL   CBC with platelets and differential   Result Value Ref Range    WBC Count 7.3 4.0 - 11.0 10e3/uL    RBC Count 4.07 (L) 4.40 - 5.90 10e6/uL    Hemoglobin 12.7 (L) 13.3 - 17.7 g/dL    Hematocrit 37.6 (L) 40.0 - 53.0 %    MCV 92 78 - 100 fL    MCH 31.2 26.5 - 33.0 pg    MCHC 33.8 31.5 - 36.5  g/dL    RDW 13.2 10.0 - 15.0 %    Platelet Count 222 150 - 450 10e3/uL    % Neutrophils 71 %    % Lymphocytes 17 %    % Monocytes 7 %    % Eosinophils 3 %    % Basophils 1 %    % Immature Granulocytes 1 %    NRBCs per 100 WBC 0 <1 /100    Absolute Neutrophils 5.2 1.6 - 8.3 10e3/uL    Absolute Lymphocytes 1.2 0.8 - 5.3 10e3/uL    Absolute Monocytes 0.5 0.0 - 1.3 10e3/uL    Absolute Eosinophils 0.2 0.0 - 0.7 10e3/uL    Absolute Basophils 0.1 0.0 - 0.2 10e3/uL    Absolute Immature Granulocytes 0.1 <=0.4 10e3/uL    Absolute NRBCs 0.0 10e3/uL   iStat Troponin, POCT   Result Value Ref Range    TROPPC POCT 0.00 <=0.12 ug/L   Head CT w/o contrast    Narrative    EXAM: CT HEAD W/O CONTRAST  LOCATION: [facility]  DATE/TIME: 6/27/2022 9:13 AM    INDICATION: Syncope.  COMPARISON: None.  TECHNIQUE: Routine CT Head without IV contrast. Multiplanar reformats. Dose reduction techniques were used.    FINDINGS:  INTRACRANIAL CONTENTS: No intracranial hemorrhage, extraaxial collection, or mass effect.  No CT evidence of acute infarct. Mild presumed chronic small vessel ischemic changes. Mild generalized volume loss. No hydrocephalus.     VISUALIZED ORBITS/SINUSES/MASTOIDS: Prior bilateral cataract surgery. Visualized portions of the orbits are otherwise unremarkable. Minimal mucosal thickening in the maxillary sinuses. No middle ear or mastoid effusion.    BONES/SOFT TISSUES: No acute abnormality.      Impression    IMPRESSION:  1.  No CT evidence for acute intracranial process.  2.  Brain atrophy and presumed chronic microvascular ischemic changes as above.   Chest XR,  PA & LAT    Narrative    EXAM: XR CHEST 2 VW  LOCATION: [facility]  DATE/TIME: 6/27/2022 9:27 AM    INDICATION: syncope  COMPARISON: None.      Impression    IMPRESSION: A few small calcified left pleural plaques. Chest otherwise negative.   Asymptomatic COVID-19 Virus (Coronavirus) by PCR Nasopharyngeal    Specimen:  Nasopharyngeal; Swab   Result Value Ref Range    SARS CoV2 PCR Negative Negative    Narrative    Testing was performed using the Xpert Xpress SARS-CoV-2 Assay on the   Cepheid Gene-Xpert Instrument Systems. Additional information about   this Emergency Use Authorization (EUA) assay can be found via the Lab   Guide. This test should be ordered for the detection of SARS-CoV-2 in   individuals who meet SARS-CoV-2 clinical and/or epidemiological   criteria. Test performance is unknown in asymptomatic patients. This   test is for in vitro diagnostic use under the FDA EUA for   laboratories certified under CLIA to perform high complexity testing.   This test has not been FDA cleared or approved. A negative result   does not rule out the presence of PCR inhibitors in the specimen or   target RNA in concentration below the limit of detection for the   assay. The possibility of a false negative should be considered if   the patient's recent exposure or clinical presentation suggests   COVID-19. This test was validated by the [facility]. This laboratory is certified under the Clinical Laboratory Improvement Amendments of 1988 (CLIA-88) as qualified to perform high complexity laboratory testing.         Most Recent 3 CBC's:Recent Labs   Lab Test 06/27/22  0849 06/08/22  0842   WBC 7.3 6.9   HGB 12.7* 12.7*   MCV 92 94    229     Most Recent 3 BMP's:Recent Labs   Lab Test 06/27/22  0849 06/08/22  0842 08/19/21  0837    142 143   POTASSIUM 4.2 4.4 4.7   CHLORIDE 108* 107 108*   CO2 23 22 25   BUN 32* 33* 29*   CR 2.38* 2.16* 2.07*   ANIONGAP 10 13 10   CLIVE 9.3 9.2 9.5   * 184* 155*       Pertinent Radiology     Radiology Results:   Recent Results (from the past 24 hour(s))   Head CT w/o contrast    Narrative    EXAM: CT HEAD W/O CONTRAST  LOCATION: [facility]  DATE/TIME: 6/27/2022 9:13 AM    INDICATION: Syncope.  COMPARISON: None.  TECHNIQUE:  Routine CT Head without IV contrast. Multiplanar reformats. Dose reduction techniques were used.    FINDINGS:  INTRACRANIAL CONTENTS: No intracranial hemorrhage, extraaxial collection, or mass effect.  No CT evidence of acute infarct. Mild presumed chronic small vessel ischemic changes. Mild generalized volume loss. No hydrocephalus.     VISUALIZED ORBITS/SINUSES/MASTOIDS: Prior bilateral cataract surgery. Visualized portions of the orbits are otherwise unremarkable. Minimal mucosal thickening in the maxillary sinuses. No middle ear or mastoid effusion.    BONES/SOFT TISSUES: No acute abnormality.      Impression    IMPRESSION:  1.  No CT evidence for acute intracranial process.  2.  Brain atrophy and presumed chronic microvascular ischemic changes as above.   Chest XR,  PA & LAT    Narrative    EXAM: XR CHEST 2 VW  LOCATION: [facility]  DATE/TIME: 6/27/2022 9:27 AM    INDICATION: syncope  COMPARISON: None.      Impression    IMPRESSION: A few small calcified left pleural plaques. Chest otherwise negative.         Advance Care Planning        Leighann Anderson MD  Hospitalist  [facility]  [facility]   Phone: #[phone]

## 2022-06-27 NOTE — PROGRESS NOTES
Bedside EEG was performed that included photic stimulation. HV is omitted due to patient state. The patient was both awake and asleep during the recording.  EEG #    EEG system was used.[id number]  Skin-New stitch left frontal

## 2022-06-28 ENCOUNTER — APPOINTMENT (OUTPATIENT)
Dept: PHYSICAL THERAPY | Facility: CLINIC | Age: 82
End: 2022-06-28
Attending: HOSPITALIST
Payer: MEDICARE

## 2022-06-28 VITALS
WEIGHT: 237.6 LBS | RESPIRATION RATE: 20 BRPM | TEMPERATURE: 97.5 F | SYSTOLIC BLOOD PRESSURE: 146 MMHG | OXYGEN SATURATION: 93 % | HEIGHT: 71 IN | BODY MASS INDEX: 33.26 KG/M2 | HEART RATE: 66 BPM | DIASTOLIC BLOOD PRESSURE: 67 MMHG

## 2022-06-28 LAB
ANION GAP SERPL CALCULATED.3IONS-SCNC: 8 MMOL/L (ref 5–18)
BUN SERPL-MCNC: 27 MG/DL (ref 8–28)
CALCIUM SERPL-MCNC: 9 MG/DL (ref 8.5–10.5)
CHLORIDE BLD-SCNC: 110 MMOL/L (ref 98–107)
CO2 SERPL-SCNC: 25 MMOL/L (ref 22–31)
CREAT SERPL-MCNC: 1.9 MG/DL (ref 0.7–1.3)
GFR SERPL CREATININE-BSD FRML MDRD: 35 ML/MIN/1.73M2
GLUCOSE BLD-MCNC: 108 MG/DL (ref 70–125)
GLUCOSE BLDC GLUCOMTR-MCNC: 119 MG/DL (ref 70–99)
POTASSIUM BLD-SCNC: 4.1 MMOL/L (ref 3.5–5)
SODIUM SERPL-SCNC: 143 MMOL/L (ref 136–145)

## 2022-06-28 PROCEDURE — 82310 ASSAY OF CALCIUM: CPT | Performed by: HOSPITALIST

## 2022-06-28 PROCEDURE — 82962 GLUCOSE BLOOD TEST: CPT

## 2022-06-28 PROCEDURE — G0378 HOSPITAL OBSERVATION PER HR: HCPCS

## 2022-06-28 PROCEDURE — 97161 PT EVAL LOW COMPLEX 20 MIN: CPT | Mod: GP

## 2022-06-28 PROCEDURE — 97110 THERAPEUTIC EXERCISES: CPT | Mod: GP

## 2022-06-28 PROCEDURE — 250N000013 HC RX MED GY IP 250 OP 250 PS 637: Performed by: HOSPITALIST

## 2022-06-28 PROCEDURE — 97530 THERAPEUTIC ACTIVITIES: CPT | Mod: GP

## 2022-06-28 PROCEDURE — 258N000003 HC RX IP 258 OP 636: Performed by: HOSPITALIST

## 2022-06-28 PROCEDURE — 96361 HYDRATE IV INFUSION ADD-ON: CPT

## 2022-06-28 PROCEDURE — 36415 COLL VENOUS BLD VENIPUNCTURE: CPT | Performed by: HOSPITALIST

## 2022-06-28 PROCEDURE — 99217 PR OBSERVATION CARE DISCHARGE: CPT | Performed by: INTERNAL MEDICINE

## 2022-06-28 RX ORDER — LEVETIRACETAM 500 MG/1
500 TABLET ORAL 2 TIMES DAILY
Qty: 90 TABLET | Refills: 0 | Status: SHIPPED | OUTPATIENT
Start: 2022-06-28 | End: 2022-07-11

## 2022-06-28 RX ORDER — LEVETIRACETAM 500 MG/1
500 TABLET ORAL 2 TIMES DAILY
Status: DISCONTINUED | OUTPATIENT
Start: 2022-06-28 | End: 2022-06-28 | Stop reason: HOSPADM

## 2022-06-28 RX ADMIN — ASPIRIN 325 MG ORAL TABLET 325 MG: 325 PILL ORAL at 08:53

## 2022-06-28 RX ADMIN — SODIUM CHLORIDE: 9 INJECTION, SOLUTION INTRAVENOUS at 02:04

## 2022-06-28 RX ADMIN — NIFEDIPINE 30 MG: 30 TABLET, EXTENDED RELEASE ORAL at 08:53

## 2022-06-28 RX ADMIN — LOSARTAN POTASSIUM 25 MG: 25 TABLET, FILM COATED ORAL at 08:53

## 2022-06-28 RX ADMIN — CARVEDILOL 3.12 MG: 3.12 TABLET, FILM COATED ORAL at 08:53

## 2022-06-28 NOTE — DISCHARGE SUMMARY
[facility] MEDICINE  DISCHARGE SUMMARY     Primary Care Physician: Dimitri Ma  Admission Date: 6/27/2022   Discharge Provider: Scooter Camilo MD Discharge Date: 6/28/2022   Diet: cardiac diet   Code Status: Full Code   Activity: as tolerated        Condition at Discharge: stable      REASON FOR PRESENTATION(See Admission Note for Details)   Syncope, lightheadedness    PRINCIPAL & ACTIVE DISCHARGE DIAGNOSES     Active Problems:    Syncope and collapse      SIGNIFICANT FINDINGS (Imaging, labs):   See EMR    PENDING LABS     n/a    PROCEDURES ( this hospitalization only)      none    RECOMMENDATIONS TO OUTPATIENT PROVIDER FOR F/U VISIT     Outpatient neurology referral  Follow up Keppra level in 1 week per neurology  Follow up with PMD for hospital discharge follow up    DISPOSITION     Home    SUMMARY OF HOSPITAL COURSE:      Yohan Hayward is a 81 year old male with PMH of   hypertension, CAD, diabetes, CKD stage III, prior stroke and TIA, has been having episodes of lightheadedness that becoming more frequent in the last 2 weeks per his wife, this morning he was walking up the stairs with a house  when all of a sudden he lost his consciousness, house  called him just prior to him falling down the stairs, he was holding a ladder which then hit the patient in the head, he was caught and did not fall down stairs, however he had a laceration on the left side of his head from the ladder hitting the patient head,, and he regained consciousness within a few seconds, EMS arrived, his blood pressure was elevated and he was orthostatic vitals positive, received IV fluids, in the ER he had 4 cm laceration on the left side of his head, that was sutured by ER provider, blood pressure was elevated, head CT without acute finding, EKG sinus rhythm, patient had positive orthostats again in the ER, neurology consulted and MRI of the brain has been ordered, EEG as  well,     Patient denies any numbness or tingling change in vision or speech, no focal weakness, previously had a stroke with a right sided arm weakness that resolved now, had no similar symptoms like stroke before, he did not feel nauseated, his wife states that episode of lightheadedness has been coming more frequent in the last 2 weeks, he was seen by cardiology recently in the he is known to have orthostatic vitals positive and his dose of antihypertensive was lowered.    -Lightheadedness, syncope  Differentials including orthostatic hypotension versus cardiogenic versus neurogenic  -Per patient wife is lightheadedness is becoming more frequent in the last 2 weeks,  Orthostatic vitals are positive, received IV fluid, will continue IV fluid hydration  -Cardiology evaluated the patient recently, reduce the dose of his losartan, and started him on nitroglycerin patch 0.1 mg daily,  - brain MRI showed small meningioma, neurology wrote a remote evaluation note and does not think the small meningioma is contributing to presenation.   Started keppra 500mg bid for possible seizures, per neuro.  keppra level in 1 week  Outpatient neurology referral.      History of coronary artery disease  Known coronary artery disease not amenable for revascularization,  Occasional chest discomfort, cardiology added nitroglycerin patch  He takes as needed nitro for chest discomfort  Had a stress test 3 years ago,     Type 2 diabetes  On oral medication at home, will hold for now and start him on  Insulin sliding scale     Acute kidney injury with CKD stage III basline Scr ~2.0  Creatinine 2.38 today,  Possible dehydration, prerenal start IV fluid and recheck kidney function  Scr improved to 1.9, baseline, on day of discharge      Hypertension  With orthostatic hypotension  Will resume medication with caution for now, will hold on nitroglycerin patch, will resume nifedipine losartan,  Advised him to discuss dose reductions with  cardiologist and PMD due to hypotension and orthostasis.      Hyperlipidemia  Cont home statin     Scalp laceration  Was repaired in the ER,  History of TIA and stroke  Without residual weakness, will resume aspirin 325     -COVID19 PCR status  Tested negative on admission, vaccinated and boosted last dose was received on November 2021    Discharge Medications with Med changes:        Review of your medicines      START taking      Dose / Directions   levETIRAcetam 500 MG tablet  Commonly known as: KEPPRA  Used for: Lightheadedness      Dose: 500 mg  Take 1 tablet (500 mg) by mouth 2 times daily  Quantity: 90 tablet  Refills: 0        CONTINUE these medicines which may have CHANGED, or have new prescriptions. If we are uncertain of the size of tablets/capsules you have at home, strength may be listed as something that might have changed.      Dose / Directions   febuxostat 40 MG Tabs tablet  Commonly known as: ULORIC  This may have changed: when to take this  Used for: History of gout      Dose: 40 mg  Take 1 tablet (40 mg) by mouth daily  Quantity: 90 tablet  Refills: 3        CONTINUE these medicines which have NOT CHANGED      Dose / Directions   aspirin 325 MG tablet  Commonly known as: ASA      Dose: 325 mg  Take 325 mg by mouth daily  Refills: 0     atorvastatin 40 MG tablet  Commonly known as: LIPITOR  Used for: Hypercholesterolemia      [ATORVASTATIN (LIPITOR) 40 MG TABLET] TAKE 1 TABLET BY MOUTH AT BEDTIME  Quantity: 90 tablet  Refills: 3     azelastine 0.1 % nasal spray  Commonly known as: ASTELIN  Used for: Vasomotor rhinitis      [AZELASTINE (ASTELIN) 137 MCG (0.1 %) NASAL SPRAY] 2 sprays in each nostril up to 3 times a day as needed for runny nose.  Quantity: 30 mL  Refills: 12     BLOOD GLUCOSE TEST STRIPS Strp      [BLOOD GLUCOSE TEST (GLUCOSE BLOOD) STRIPS] USE TO TEST BLOOD SUGAR 1-2 TIMES DAILY  Refills: 0     carvedilol 3.125 MG tablet  Commonly known as: COREG  Used for: Essential hypertension,  benign      Dose: 3.125 mg  Take 1 tablet (3.125 mg) by mouth 2 times daily (with meals)  Quantity: 180 tablet  Refills: 2     glimepiride 2 MG tablet  Commonly known as: AMARYL  Used for: Type 2 diabetes mellitus without complication, without long-term current use of insulin (H)      [GLIMEPIRIDE (AMARYL) 2 MG TABLET] Take 2 tablets (4 mg) by mouth every morning with meal.  Quantity: 180 tablet  Refills: 3     * losartan 50 MG tablet  Commonly known as: COZAAR      Dose: 25 mg  Take 25 mg by mouth every morning  Refills: 0     * losartan 50 MG tablet  Commonly known as: COZAAR      Dose: 50 mg  Take 50 mg by mouth every evening  Refills: 0     multivitamin, therapeutic Tabs tablet      Dose: 1 tablet  Take 1 tablet by mouth daily  Refills: 0     NIFEdipine ER OSMOTIC 30 MG 24 hr tablet  Commonly known as: Procardia XL  Used for: Essential hypertension, benign      Dose: 30 mg  Take 1 tablet (30 mg) by mouth daily Dose reduction to once a day  Quantity: 90 tablet  Refills: 3     * nitroGLYcerin 0.4 MG sublingual tablet  Commonly known as: NITROSTAT  Used for: Coronary artery disease due to calcified coronary lesion      [NITROGLYCERIN (NITROSTAT) 0.4 MG SL TABLET] DISSOLVE ONE TABLET UNDER THE TONGUE EVERY 5 MINUTES AS NEEDED FOR CHEST PAIN.  DO NOT EXCEED A TOTAL OF 3 DOSES IN 15 MINUTES  Quantity: 25 tablet  Refills: 2     * nitroGLYcerin 0.1 MG/HR 24 hr patch  Commonly known as: NITRODUR  Used for: Coronary artery disease of native artery of native heart with stable angina pectoris (H)      Dose: 1 patch  Place 1 patch onto the skin daily Remove patch at bedtime.  Quantity: 30 patch  Refills: 11     Ozempic (0.25 or 0.5 MG/DOSE) 2 MG/1.5ML Sopn pen  Used for: Type 2 diabetes mellitus with vascular disease (H)  Generic drug: semaglutide      Dose: 0.5 mg  Inject 0.5 mg Subcutaneous once a week  Quantity: 4.5 mL  Refills: 4         * This list has 4 medication(s) that are the same as other medications prescribed  for you. Read the directions carefully, and ask your doctor or other care provider to review them with you.               Where to get your medicines      These medications were sent to [facility] [address] [place] [id number] [address] [address]    Phone: [phone]     levETIRAcetam 500 MG tablet             Rationale for medication changes:      Added keppra per neurology recommendations, as above        Consults   Neurology (remote)      Immunizations given this encounter     n/a       Anticoagulation Information      Recent INR results: No results for input(s): INR in the last 168 hours.  Warfarin doses (if applicable) or name of other anticoagulant: n/a      Discharge Orders     Discharge Procedure Orders   Adult Neurology  Referral   Standing Status: Future   Referral Priority: Priority: 1-2 Weeks Referral Type: Consultation   Referred to Provider: LINNEA FLOWER   Number of Visits Requested: 1     Reason for your hospital stay   Order Comments: Dizziness and lightheadedness.     Follow-up and recommended labs and tests    Order Comments: Follow up with primary care provider, Dimitri Ma, within 7 days for hospital follow- up.  The following labs/tests are recommended: keppra level with either your PMD or neurology within 1 week.     Activity   Order Comments: Your activity upon discharge: activity as tolerated     Order Specific Question Answer Comments   Is discharge order? Yes      Diet   Order Comments: Follow this diet upon discharge: Orders Placed This Encounter      Combination Diet Moderate Consistent Carb (60 g CHO per Meal) Diet; 2 gm NA Diet; Low Saturated Fat Diet     Order Specific Question Answer Comments   Is discharge order? Yes      Examination     Vital Signs in last 24 hours:   [unfilled]  Constitutional: awake, alert, cooperative, no apparent distress, and appears stated age     Respiratory: No increased work of breathing, good air  exchange, clear to auscultation bilaterally, no crackles or wheezing  Cardiovascular: Normal apical impulse, regular rate and rhythm, normal S1 and S2, no S3 or S4, and no murmur noted  GI: No scars, normal bowel sounds, soft, non-distended, non-tender, no masses palpated, no hepatosplenomegally  Skin: normal skin color, texture, turgor, no redness, warmth, or swelling, and no rashes  Musculoskeletal: There is no redness, warmth, or swelling of the joints.  Full range of motion noted.  Motor strength is 5 out of 5 all extremities bilaterally.  Tone is normal. no lower extremity pitting edema present  Neurologic: Cranial nerves II-XII are grossly intact.i speech normal,      Please see EMR for more detailed significant labs, imaging, consultant notes etc.  Total time spent on discharge: 35 minutes    @ME2@   [facility] Hospitalist Service: Ph:[phone]    CC:Dimitri Ma

## 2022-06-28 NOTE — PLAN OF CARE
Problem: Plan of Care - These are the overarching goals to be used throughout the patient stay.    Goal: Optimal Comfort and Wellbeing  Outcome: Ongoing, Progressing     Problem: Syncope  Goal: Absence of Syncopal Symptoms  Outcome: Ongoing, Progressing     Patient had uneventful shift.  Slept well.  NS running at 100 ml/hr.  VSS.  Calls appropriately to get to bathroom with SBA.  No c/o dizziness or lightheadedness.  All cares performed as ordered and explained.  Will continue to monitor and follow plan of care.

## 2022-06-28 NOTE — PROGRESS NOTES
06/28/22 0946   Quick Adds   Quick Adds Certification   Type of Visit Initial PT Evaluation   Living Environment   People in Home spouse   Current Living Arrangements condominium   Home Accessibility stairs to enter home   Number of Stairs, Main Entrance 1  (threshhold to get in)   Stair Railings, Main Entrance none   Self-Care   Usual Activity Tolerance good   Current Activity Tolerance good   Equipment Currently Used at Home cane, straight;grab bar, tub/shower   General Information   Onset of Illness/Injury or Date of Surgery 06/27/22   Referring Physician Leighann Anderson MD   Patient/Family Therapy Goals Statement (PT) return home   Pertinent History of Current Problem (include personal factors and/or comorbidities that impact the POC) Lightheadedness, syncope  Differentials including orthostatic hypotension versus cardiogenic versus neurogenic   Pain Assessment   Patient Currently in Pain No   Range of Motion (ROM)   Range of Motion ROM is WFL   Strength (Manual Muscle Testing)   Strength (Manual Muscle Testing) Deficits observed during functional mobility   Bed Mobility   Bed Mobility supine-sit   Supine-Sit Sharkey (Bed Mobility) supervision   Assistive Device (Bed Mobility)   (none)   Comment, (Bed Mobility) sitting EOB after PT. light headed with initial sitting EOB   Transfers   Transfers sit-stand transfer   Sit-Stand Transfer   Sit-Stand Sharkey (Transfers) supervision;independent   Assistive Device (Sit-Stand Transfers)   (none)   Comment, (Sit-Stand Transfer) no light headedness noted   Gait/Stairs (Locomotion)   Sharkey Level (Gait) supervision   Assistive Device (Gait)   (none)   Distance in Feet (Required for LE Total Joints) 200'   Comment, (Gait/Stairs) no LOB   Balance   Balance no deficits were identified   Clinical Impression   Criteria for Skilled Therapeutic Intervention Yes, treatment indicated   PT Diagnosis (PT) impaired functional mobility   Influenced by the  following impairments light headedness   Functional limitations due to impairments transfers, gait   Clinical Presentation (PT Evaluation Complexity) Stable/Uncomplicated   Clinical Presentation Rationale pt presents as medically stable   Clinical Decision Making (Complexity) low complexity   Planned Therapy Interventions (PT) strengthening;transfer training;gait training   Anticipated Equipment Needs at Discharge (PT)   (none)   Risk & Benefits of therapy have been explained evaluation/treatment results reviewed;patient   PT Discharge Planning   PT Discharge Recommendation (DC Rec) home with assist   PT Rationale for DC Rec okay for d/c to home with spouse   Therapy Certification   Start of care date 06/28/22   Certification date from 06/28/22   Certification date to 06/28/22   Medical Diagnosis syncope and collapse   Total Evaluation Time   Total Evaluation Time (Minutes) 10   Physical Therapy Goals   PT Frequency One time eval and treatment only   PT Predicted Duration/Target Date for Goal Attainment 06/28/22   PT Goals Transfers;Gait   PT: Transfers Independent;Sit to/from stand;Goal Met;Completed   PT: Gait Supervision/stand-by assist;Greater than 200 feet;Goal Met;Completed

## 2022-06-28 NOTE — PLAN OF CARE
Problem: Electrolyte Imbalance (Chronic Kidney Disease)  Goal: Electrolyte Balance  Outcome: Ongoing, Progressing     Problem: Syncope  Goal: Absence of Syncopal Symptoms  Outcome: Ongoing, Progressing     Pt admitted from  ED for ongoing monitoring of syncopal episodes. Per pt, he states that when he is very active in the morning he notices he is more likely to have these episodes. At rest, he denies any dizziness, lightheadedness, SOB, chest pain. Positive orthostatics earlier today. Tele showing sinus rhythm w/ occasional PVCs. He has a R PIV infusing NS at 100 mL/hr. Continue to monitor for dizziness, lightheadedness, vitals & follow POC.     OBSERVATION GOALS:      Vital signs normal or at patient baseline: Not met, positive orthostatics.

## 2022-06-28 NOTE — PLAN OF CARE
Physical Therapy Discharge Summary    Reason for therapy discharge:    All goals and outcomes met, no further needs identified.    Progress towards therapy goal(s). See goals on Care Plan in [facility] electronic health record for goal details.  Goals met    Therapy recommendation(s):    Continue home exercise program.

## 2022-06-28 NOTE — PLAN OF CARE
Problem: Plan of Care - These are the overarching goals to be used throughout the patient stay.    Goal: Absence of Hospital-Acquired Illness or Injury  Intervention: Identify and Manage Fall Risk  Recent Flowsheet Documentation  Taken 6/28/2022 0800 by Miri Rodrigues, RN  Safety Promotion/Fall Prevention:   patient and family education   nonskid shoes/slippers when out of bed   mobility aid in reach   lighting adjusted   increase visualization of patient   increased rounding and observation   fall prevention program maintained   clutter free environment maintained   activity supervised     Problem: Syncope  Goal: Absence of Syncopal Symptoms  6/28/2022 1103 by Miri Rodrigues, RN  Outcome: Ongoing, Progressing    Pt up ambulated in hallway. PT was able to work with patient. PT recommend no further needs identified. Pt alert and oriented. VSS. LS clear. Tolerating regular diet. Voiding spontaneously. Anticipate discharge home this morning. Will monitor and continue plan of care.

## 2022-06-28 NOTE — PROGRESS NOTES
[facility]      OUTPATIENT PHYSICAL THERAPY EVALUATION  PLAN OF TREATMENT FOR OUTPATIENT REHABILITATION  (COMPLETE FOR INITIAL CLAIMS ONLY)  Patient's Last Name, First Name, M.I.  YOB: 1940  Yohan Hayward                        Provider's Name  [facility] Medical Record No.  [id number]                               Onset Date:  06/27/22   Start of Care Date:  06/28/22      Type:     _X_PT   ___OT   ___SLP Medical Diagnosis:  syncope and collapse                        PT Diagnosis:  impaired functional mobility   Visits from SOC:  1   _________________________________________________________________________________  Plan of Treatment/Functional Goals    Planned Interventions: strengthening, transfer training, gait training     Goals: See Physical Therapy Goals on Care Plan in [employer] electronic health record.    Therapy Frequency: One time eval and treatment only  Predicted Duration of Therapy Intervention: 06/28/22  _________________________________________________________________________________    I CERTIFY THE NEED FOR THESE SERVICES FURNISHED UNDER        THIS PLAN OF TREATMENT AND WHILE UNDER MY CARE     (Physician co-signature of this document indicates review and certification of the therapy plan).              Certification date from: 06/28/22, Certification date to: 06/28/22    Referring Physician: Leighann Anderson MD            Initial Assessment        See Physical Therapy evaluation dated 06/28/22 in Epic electronic health record.

## 2022-06-28 NOTE — PROGRESS NOTES
06/28/22 0946   Lying Orthostatic BP   Lying Orthostatic /74   Lying Orthostatic Pulse 67 bpm   Sitting Orthostatic BP   Sitting Orthostatic /55 - pt reporting slight light headedness when first sitting, needing approx 2 minutes to calm down   Sitting Orthostatic Pulse 72 bpm   Standing Orthostatic BP   Standing Orthostatic /69 - no light headedness  (164/75 following ambulation)   Standing Orthostatic Pulse 76 bpm  (72 following ambulation)       Estella Rodriguez, PT, DPT  6/28/2022

## 2022-06-28 NOTE — PLAN OF CARE
Problem: Plan of Care - These are the overarching goals to be used throughout the patient stay.    Goal: Readiness for Transition of Care  Outcome: Adequate for Care Transition    Discharge plan reviewed with patient. PIV and Tele removed. Copy of AVS given. Questions answered. Discharged home with wife provide transportation.

## 2022-06-28 NOTE — PROGRESS NOTES
Care Management Discharge Note    Discharge Date: 06/28/2022       Discharge Disposition: Home    Discharge Services: None    Discharge DME: None    Discharge Transportation: family or friend will provide      Additional Information:  Chart reviewed. No CM needs. Discharged per bedside RN.         Latanya Way RN

## 2022-06-28 NOTE — PROGRESS NOTES
OBSERVATION GOALS:     Vital signs normal or at patient baseline: Not met, positive orthostatics.

## 2022-07-08 ENCOUNTER — ALLIED HEALTH/NURSE VISIT (OUTPATIENT)
Dept: FAMILY MEDICINE | Facility: CLINIC | Age: 82
End: 2022-07-08
Payer: MEDICARE

## 2022-07-08 DIAGNOSIS — I25.84 CORONARY ARTERY DISEASE DUE TO CALCIFIED CORONARY LESION: ICD-10-CM

## 2022-07-08 DIAGNOSIS — I25.10 CORONARY ARTERY DISEASE DUE TO CALCIFIED CORONARY LESION: ICD-10-CM

## 2022-07-08 DIAGNOSIS — Z48.02 VISIT FOR SUTURE REMOVAL: Primary | ICD-10-CM

## 2022-07-08 PROCEDURE — 99207 PR NO CHARGE NURSE ONLY: CPT

## 2022-07-08 NOTE — PROGRESS NOTES
Patient called earlier and stated his wife was trying to take out his sutures and she was having a hard time. They were told to get it taken out in 7 days and it has been longer than that. Patient advised to come in and nurse will take sutures out.    Yohan Hayward presents to the clinic today for removal of sutures.  The patient has had the sutures in place for 11 days.  There has been no history of infection or drainage.  4 sutures are seen located on the left side of head (scalp); 6 was placed but wife got one for sure this morning and patient's not sure what happened to th other one as there are only 4 sutures left.  The wound is healing well with no signs of infection.  Tetanus (tdap) was given 10/31/2012 .   All 4 sutures were easily removed today.  Routine wound care discussed.  The patient will follow up as needed.    Patient given the following advices and he verbalized understanding:  Call your child's healthcare provider right away if any of these occur:    Wound reopens or bleeds    Signs of an infection, such as:  ? Increasing redness or swelling around the wound  ? Increased warmth from the wound  ? Worsening pain  ? Red streaking lines away from the wound  ? Fluid draining from the wound    Fever of 100.4 F (38 C) or higher, or as directed by your child's healthcare provider    Patient will be back to see Dr Ma on Monday for a follow up and he will see if he needs his tetanus injection then or wait until October.    Roberta Melvin, WILBURN, RN  [facility]

## 2022-07-09 NOTE — TELEPHONE ENCOUNTER
"Routing refill request to provider for review/approval because:  Labs not current:  BP    Last Written Prescription Date:  3/29/22  Last Fill Quantity: 25,  # refills: 2   Last office visit provider:  6/8/22     Requested Prescriptions   Pending Prescriptions Disp Refills     nitroGLYcerin (NITROSTAT) 0.4 MG sublingual tablet 25 tablet 2     Sig: [NITROGLYCERIN (NITROSTAT) 0.4 MG SL TABLET] DISSOLVE ONE TABLET UNDER THE TONGUE EVERY 5 MINUTES AS NEEDED FOR CHEST PAIN.  DO NOT EXCEED A TOTAL OF 3 DOSES IN 15 MINUTES       Nitrates Failed - 7/8/2022  9:34 AM        Failed - Blood pressure under 140/90 in past 12 months     BP Readings from Last 3 Encounters:   06/28/22 (!) 146/67   06/21/22 122/58   06/08/22 138/64                 Failed - Sublingual nitro order needs review     If refill exceeds 1 bottle per month, please forward request to provider.           Passed - Pt is not on erectile dysfunction medications        Passed - Recent (12 mo) or future (30 days) visit within the authorizing provider's specialty     Patient has had an office visit with the authorizing provider or a provider within the authorizing providers department within the previous 12 mos or has a future within next 30 days. See \"Patient Info\" tab in inbasket, or \"Choose Columns\" in Meds & Orders section of the refill encounter.              Passed - Medication is active on med list        Passed - Patient is age 18 or older             Monica Contreras 07/08/22 7:52 PM  "

## 2022-07-11 ENCOUNTER — MEDICAL CORRESPONDENCE (OUTPATIENT)
Dept: HEALTH INFORMATION MANAGEMENT | Facility: CLINIC | Age: 82
End: 2022-07-11

## 2022-07-11 ENCOUNTER — OFFICE VISIT (OUTPATIENT)
Dept: INTERNAL MEDICINE | Facility: CLINIC | Age: 82
End: 2022-07-11
Payer: MEDICARE

## 2022-07-11 VITALS
SYSTOLIC BLOOD PRESSURE: 128 MMHG | HEIGHT: 71 IN | OXYGEN SATURATION: 96 % | WEIGHT: 242 LBS | HEART RATE: 70 BPM | DIASTOLIC BLOOD PRESSURE: 70 MMHG | BODY MASS INDEX: 33.88 KG/M2

## 2022-07-11 DIAGNOSIS — I25.10 CORONARY ARTERY DISEASE DUE TO CALCIFIED CORONARY LESION: ICD-10-CM

## 2022-07-11 DIAGNOSIS — Z87.898 HISTORY OF SYNCOPE: ICD-10-CM

## 2022-07-11 DIAGNOSIS — I95.1 ORTHOSTATIC HYPOTENSION: Primary | ICD-10-CM

## 2022-07-11 DIAGNOSIS — N18.4 CHRONIC RENAL INSUFFICIENCY, STAGE 4 (SEVERE) (H): ICD-10-CM

## 2022-07-11 DIAGNOSIS — I10 ESSENTIAL HYPERTENSION: ICD-10-CM

## 2022-07-11 DIAGNOSIS — R90.0 INTRACRANIAL MASS: ICD-10-CM

## 2022-07-11 DIAGNOSIS — E11.69 TYPE 2 DIABETES MELLITUS WITH OTHER SPECIFIED COMPLICATION, WITHOUT LONG-TERM CURRENT USE OF INSULIN (H): ICD-10-CM

## 2022-07-11 DIAGNOSIS — I25.84 CORONARY ARTERY DISEASE DUE TO CALCIFIED CORONARY LESION: ICD-10-CM

## 2022-07-11 PROCEDURE — 99214 OFFICE O/P EST MOD 30 MIN: CPT | Performed by: INTERNAL MEDICINE

## 2022-07-11 RX ORDER — LOSARTAN POTASSIUM 50 MG/1
25 TABLET ORAL 2 TIMES DAILY
Start: 2022-07-11 | End: 2022-08-10

## 2022-07-11 RX ORDER — NITROGLYCERIN 0.4 MG/1
TABLET SUBLINGUAL
Qty: 25 TABLET | Refills: 2 | Status: SHIPPED | OUTPATIENT
Start: 2022-07-11 | End: 2023-04-27

## 2022-07-11 RX ORDER — GLUCOSAMINE HCL/CHONDROITIN SU 500-400 MG
1 CAPSULE ORAL 2 TIMES DAILY
Qty: 300 STRIP | Refills: 3 | Status: SHIPPED | OUTPATIENT
Start: 2022-07-11 | End: 2022-07-27

## 2022-07-11 RX ORDER — BLOOD-GLUCOSE CONTROL, NORMAL
EACH MISCELLANEOUS
Qty: 1 EACH | Refills: 1 | Status: SHIPPED | OUTPATIENT
Start: 2022-07-11 | End: 2023-05-05

## 2022-07-11 NOTE — PATIENT INSTRUCTIONS
Reduce losartan down to 25 mg twice a day, by cutting pills in half.    Do not start the nitroglycerin patch.    Continue to check blood pressures.  If you are getting blood pressures less than 110/60 or continue to have lightheaded dizzy spells, contact me and you may need further reduction medications.    Make an appointment with the nephrology clinic, or kidney clinic.  For your chronic kidney disease.

## 2022-07-11 NOTE — PROGRESS NOTES
[facility] Follow Up Note    Yohan Hayward   81 year old male    Date of Visit: 7/11/2022    Chief Complaint   Patient presents with     Hospital F/U     Fainted, hit head, thinks he may have low BP in the AM - Was started on Keppra but stopped due to side effects     Subjective  81-year-old male lives independently with wife.  Patient had a fainting spell on June 27.    Patient has coronary disease with class II angina on atorvastatin 40 mg and a full dose aspirin but has not had bleeding issues or abdominal pain or melena.    Patient saw cardiology on June 21, had his losartan reduced to 25 mg in the morning and 50 mg in the evening, and was given a nitroglycerin patch.    Patient had previously been on 50 mg twice a day losartan in addition to nifedipine.    Patient had significant orthostatic hypotension symptoms with dizziness on nifedipine 30 mg twice a day, which improved when that was reduced to once a day.    Patient continued to have some intermittent orthostatic symptoms.    At his physical exam with me on June 8 blood pressure was 138/64 and medications were kept the same.    At his cardiology appointment on June 21 is blood pressure was 122/58.    Patient has been checking his blood pressure in the morning, and considerable orthostatic changes.  Sitting blood pressures have been 114/60- 119/52.  But standing blood pressures have been 88/52- 101/52 with dizziness.    He tends to have more symptoms in the morning, improving during the day.    He has had suspected sleep apnea but has not had evaluation.    Patient has stopped drinking alcohol in the evening for the past few months.    Patient has significant peripheral neuropathy.    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  2014 MRA did not show severe vascular disease.    No palpitations or history of arrhythmia.    No history of seizure or headache complaints.    On June 27  patient had just woken up in the morning and had not had breakfast.  He had a  visit him and was going to use a ladder on the top of the stairs.  He was helping a  when patient found himself regaining consciousness on the floor.  He had bumped his head with laceration.  He was told that he had passed out and had hit his head on the ladder, but did not fall down the stairs.  He was brought to the hospital.    The paramedics on the scene reported the patient that his blood pressure lying down was 111/54 but standing up was 89/45.    In the hospital he was found to be volume depleted and orthostatic in the emergency room.  Given IV fluids.  EKG was sinus rhythm.  Head CT scan was negative for bleed or fracture.    Patient had worsening creatinine to 2.38, returned to baseline at discharge at 1.9 creatinine.    He has baseline stage IV chronic renal insufficiency with a creatinine of 2.0.    He has not been having edema issues.    He continues on carvedilol 3.125 mg twice daily.  He had been on losartan 25 mg in the morning and 50 mg in the evening.  Still on nifedipine 30 mg a day.    Diabetes type 2 has been variable control with blood sugars 1 .  Hemoglobin A1c last month in the hospital was 7.6%.    He is on Amaryl 4 mg in the morning and Ozempic 0.5 mg weekly.    An MRI in the hospital did show a intracranial density consistent with meningioma but it did not appear to be causing a mass-effect was not felt to be the cause of patient's symptoms.  But neurology had put him on Keppra 500 mg twice daily, patient took it for 2 days but he felt discoordinated, more unsteady and did not want to continue to take it.  He is no longer on that.  He was given a neurology appointment in November, patient states he plans to cancel.    He denies any cough or fever or shortness of breath.  No chest pain or palpitations.  No abdominal pain or urinary symptoms.  He has been eating  "normally.    Patient reports that he did not start using the nitroglycerin patch after was given to him by cardiology, still not tried.  He did not have a nitroglycerin patch on when he fainted.    I reviewed the discharge summary from [facility] June 27, 2022-June 28/2022    PMHx:  No past medical history on file.  PSHx:    Past Surgical History:   Procedure Laterality Date     ESOPHAGOSCOPY, GASTROSCOPY, DUODENOSCOPY (EGD), COMBINED  05/05/2011     Immunizations:   Immunization History   Administered Date(s) Administered     COVID-19,PF,Pfizer (12+ Yrs) 01/30/2021, 02/23/2021     FLU 6-35 months 10/10/2009     Flu 65+ Years 10/13/2017     Influenza (High Dose) 3 valent vaccine 09/14/2015, 10/27/2016, 09/04/2018, 10/08/2019     Influenza (IIV3) PF 11/10/2011     Influenza, Quad, High Dose, Pf, 65yr+ (Fluzone HD) 10/08/2020     Influenza, Whole Virus 10/16/2013     Pneumo Conj 13-V (2010&after) 07/23/2015     Pneumococcal 23 valent 10/31/2012     Tdap (Adacel,Boostrix) 10/31/2012       ROS A comprehensive review of systems was performed and was otherwise negative    Medications, allergies, and problem list were reviewed and updated    Exam  /70 (BP Location: Right arm, Patient Position: Sitting, Cuff Size: Adult Large)   Pulse 70   Ht 1.803 m (5' 11\")   Wt 109.8 kg (242 lb)   SpO2 96%   BMI 33.75 kg/m    Alert and oriented x3.  Patient can stand and ambulate within normal limits.  No carotid bruits.  Pupils and irises equal and reactive.  Lungs are clear.  Heart is regular without murmur.  Abdomen is nontender no edema    Assessment/Plan  1. Orthostatic hypotension  Orthostatic hypotension consistent with his peripheral neuropathy and likely has autonomic neuropathy with variable blood pressure control.  Fluctuating blood pressures with suspected sleep apnea, but recently has been having more hypotension, now syncopal event.    Reduce losartan down to 25 mg twice a day, and monitor blood pressure " closely.    He did have improvement of symptoms with reduction of nifedipine down to once a day.    Carvedilol 3.125 mg twice daily.    Post Discharge Medication Reconciliation Status: discharge medications reconciled and changed, per note/orders.      2. History of syncope  Consistent with orthostatic hypotensive syncopal event    3. Essential hypertension  Fluctuating blood pressure control with history of hypertension, suspected sleep apnea, chronic renal sufficiency and suspected autonomic neuropathy causing fluctuating blood pressures.    Reevaluate in 2 weeks on lower dose losartan.  - losartan (COZAAR) 50 MG tablet; Take 0.5 tablets (25 mg) by mouth 2 times daily    4. Chronic renal insufficiency, stage 4 (severe) (H)  Plan recheck of labs at next visit.  He still has not made an appoint with nephrology clinic, refer again  - Adult Nephrology  Referral; Future    5. Coronary artery disease due to calcified coronary lesion  No new chest pain.  Negative work-up in the hospital for MI.  He will not start the nitroglycerin patch as he is having significant orthostatic hypotension symptoms.    Aspirin and Lipitor 40 mg    6. Type 2 diabetes mellitus with other specified complication, without long-term current use of insulin (H)  Not adequately controlled at this time.  Limiting Ozempic with his chronic renal insufficiency.  No low blood sugars, continue on Amaryl at this time.  Consider adding Lantus insulin daily.    Follow-up for further discussion in 2 weeks.  - Glucose Blood (BLOOD GLUCOSE TEST STRIPS) STRP; 1 strip by Subdermal route 2 times daily Contour Next Strips  Dispense: 300 strip; Refill: 3  - blood glucose calibration (NO BRAND SPECIFIED) solution; Use to calibrate blood glucose monitor as needed as directed.  Dispense: 1 each; Refill: 1    7. Intracranial mass  Consistent meningioma.  Syncopal event not felt to be a seizure, patient did not want to continue on the Keppra.  He has recurrent  events, will need reevaluation at this time.  Patient can keep his neurology appointment in November.  I would anticipate repeat imaging of meningioma in 6 to 12 months.    Suspected sleep apnea, patient does report that he stopped drinking his scotch in the evening.  Continue to avoid alcohol.      Return in about 2 weeks (around 7/25/2022) for Follow up.   Patient Instructions   Reduce losartan down to 25 mg twice a day, by cutting pills in half.    Do not start the nitroglycerin patch.    Continue to check blood pressures.  If you are getting blood pressures less than 110/60 or continue to have lightheaded dizzy spells, contact me and you may need further reduction medications.    Make an appointment with the nephrology clinic, or kidney clinic.  For your chronic kidney disease.    Dimitri Ma MD, MD        Current Outpatient Medications   Medication Sig Dispense Refill     aspirin 325 MG tablet Take 325 mg by mouth daily       atorvastatin (LIPITOR) 40 MG tablet [ATORVASTATIN (LIPITOR) 40 MG TABLET] TAKE 1 TABLET BY MOUTH AT BEDTIME 90 tablet 3     azelastine (ASTELIN) 137 mcg (0.1 %) nasal spray [AZELASTINE (ASTELIN) 137 MCG (0.1 %) NASAL SPRAY] 2 sprays in each nostril up to 3 times a day as needed for runny nose. 30 mL 12     blood glucose calibration (NO BRAND SPECIFIED) solution Use to calibrate blood glucose monitor as needed as directed. 1 each 1     carvedilol (COREG) 3.125 MG tablet Take 1 tablet (3.125 mg) by mouth 2 times daily (with meals) 180 tablet 2     febuxostat (ULORIC) 40 MG TABS tablet Take 1 tablet (40 mg) by mouth daily (Patient taking differently: Take 40 mg by mouth every other day) 90 tablet 3     glimepiride (AMARYL) 2 MG tablet [GLIMEPIRIDE (AMARYL) 2 MG TABLET] Take 2 tablets (4 mg) by mouth every morning with meal. 180 tablet 3     Glucose Blood (BLOOD GLUCOSE TEST STRIPS) STRP 1 strip by Subdermal route 2 times daily Contour Next Strips 300 strip 3     losartan (COZAAR) 50 MG  tablet Take 0.5 tablets (25 mg) by mouth 2 times daily       multivitamin, therapeutic (THERA-VIT) TABS tablet Take 1 tablet by mouth daily       NIFEdipine ER OSMOTIC (PROCARDIA XL) 30 MG 24 hr tablet Take 1 tablet (30 mg) by mouth daily Dose reduction to once a day 90 tablet 3     nitroGLYcerin (NITROSTAT) 0.4 MG sublingual tablet [NITROGLYCERIN (NITROSTAT) 0.4 MG SL TABLET] DISSOLVE ONE TABLET UNDER THE TONGUE EVERY 5 MINUTES AS NEEDED FOR CHEST PAIN.  DO NOT EXCEED A TOTAL OF 3 DOSES IN 15 MINUTES 25 tablet 2     semaglutide (OZEMPIC, 0.25 OR 0.5 MG/DOSE,) 2 MG/1.5ML SOPN pen Inject 0.5 mg Subcutaneous once a week 4.5 mL 4     Allergies   Allergen Reactions     Allopurinol Other (See Comments)     Other reaction(s): Chest Tightness, Gave pt pains in chest like a heart attack, Pain     Social History     Tobacco Use     Smoking status: Former Smoker     Packs/day: 1.50     Years: 15.00     Pack years: 22.50     Types: Cigarettes     Quit date: 1981     Years since quittin.5     Smokeless tobacco: Never Used   Substance Use Topics     Alcohol use: Yes     Alcohol/week: 14.0 standard drinks             Grey Almanzar is a 81 year old, presenting for the following health issues:  Hospital F/U      HPI           Review of Systems         Objective    There were no vitals taken for this visit.  There is no height or weight on file to calculate BMI.  Physical Exam                           .  ..

## 2022-07-12 ENCOUNTER — MEDICAL CORRESPONDENCE (OUTPATIENT)
Dept: HEALTH INFORMATION MANAGEMENT | Facility: CLINIC | Age: 82
End: 2022-07-12

## 2022-07-27 ENCOUNTER — OFFICE VISIT (OUTPATIENT)
Dept: INTERNAL MEDICINE | Facility: CLINIC | Age: 82
End: 2022-07-27
Payer: MEDICARE

## 2022-07-27 VITALS
WEIGHT: 241 LBS | BODY MASS INDEX: 33.74 KG/M2 | DIASTOLIC BLOOD PRESSURE: 76 MMHG | HEIGHT: 71 IN | HEART RATE: 71 BPM | SYSTOLIC BLOOD PRESSURE: 144 MMHG | OXYGEN SATURATION: 98 %

## 2022-07-27 DIAGNOSIS — Z79.4 TYPE 2 DIABETES MELLITUS WITH OTHER SPECIFIED COMPLICATION, WITH LONG-TERM CURRENT USE OF INSULIN (H): ICD-10-CM

## 2022-07-27 DIAGNOSIS — I25.84 CORONARY ARTERY DISEASE DUE TO CALCIFIED CORONARY LESION: ICD-10-CM

## 2022-07-27 DIAGNOSIS — E11.69 TYPE 2 DIABETES MELLITUS WITH OTHER SPECIFIED COMPLICATION, WITHOUT LONG-TERM CURRENT USE OF INSULIN (H): ICD-10-CM

## 2022-07-27 DIAGNOSIS — I25.10 CORONARY ARTERY DISEASE DUE TO CALCIFIED CORONARY LESION: ICD-10-CM

## 2022-07-27 DIAGNOSIS — N18.32 CHRONIC RENAL IMPAIRMENT, STAGE 3B (H): ICD-10-CM

## 2022-07-27 DIAGNOSIS — I10 ESSENTIAL HYPERTENSION: Primary | ICD-10-CM

## 2022-07-27 DIAGNOSIS — E11.69 TYPE 2 DIABETES MELLITUS WITH OTHER SPECIFIED COMPLICATION, WITH LONG-TERM CURRENT USE OF INSULIN (H): ICD-10-CM

## 2022-07-27 DIAGNOSIS — Z87.898 HISTORY OF SYNCOPE: ICD-10-CM

## 2022-07-27 PROCEDURE — 99214 OFFICE O/P EST MOD 30 MIN: CPT | Performed by: INTERNAL MEDICINE

## 2022-07-27 RX ORDER — GLUCOSAMINE HCL/CHONDROITIN SU 500-400 MG
1 CAPSULE ORAL
Qty: 100 STRIP | Refills: 11 | Status: SHIPPED | OUTPATIENT
Start: 2022-07-27 | End: 2023-05-05

## 2022-07-27 NOTE — PATIENT INSTRUCTIONS
Stop nifedipine.    Continue losartan at 25 mg twice a day, and continue carvedilol and other medication the same.    Check blood pressure.  If blood pressure is running above 150/85 more than occasionally, contact me and I would have you consider restarting a calcium channel blocker with a slightly different calcium channel blocker, called amlodipine at 2.5 mg a day.    Follow-up with me on September 8 as planned.    Check blood sugars before breakfast, also can check before supper occasionally.  Record those numbers and bring list of blood sugars to your next visit.

## 2022-07-27 NOTE — PROGRESS NOTES
Yohan Hayward   81 year old male    Date of Visit: 7/27/2022    Chief Complaint   Patient presents with     Follow Up     Blood pressure- not fasting     Subjective  Yohan is an 81-year-old male with a history of type 2 diabetes, hypertension with chronic renal insufficiency.    He does have a history of hypertensive encephalopathy with punctate occipital and left parietal strokes in 2014.    He has obesity, but not the diagnosis of sleep apnea.    He has had stage IIIb renal insufficiency, slightly worsening creatinine in the hospital in June when he had a syncopal event.  Creatinine back to baseline at 1.9 end of June.    Patient was having orthostatic hypotension symptoms earlier this spring.    On June 27 he had just woken up did not have breakfast, he was working with a ladder with a , and he had a syncopal event.    Has not had recurrent syncope.    Patient continues to have orthostatic dizziness, mainly in the morning or after he can sitting for some time.    He was on 50 mg of losartan twice a day previously.  But has been lowered to 25 mg twice a day on July 11.    He continues on nifedipine CC 30 mg a day.  He had previously been on 60 mg a day with significant orthostatic symptoms improved with that dose reduction previously.    Patient's blood pressure has been in the 90s over 40s in the morning.  In the afternoon or later in the day it is generally in the 130s over 60s, but sometimes up to 150/70.  It is consistently low in the morning.  Diastolic blood pressures have been low.    No edema or increasing shortness of breath.  No chest pain.  History of class II angina with coronary disease.    No palpitations.    He stopped drinking alcohol a few months ago and remains a nondrinker.  History of peripheral neuropathy.  Suspected sleep apnea but has not been diagnosed.    In the hospital in June he had an MRI of the brain that showed a small meningioma but no mass-effect.  He did  "not tolerate the Keppra did not continue on that.  There is been no documented seizures.    He still has not gotten his test strips and is not checking his blood sugars.  Hemoglobin A1c was 7.6% in June.  Still on Amaryl 4 mg a day and Ozempic 0.5 mg weekly.    PMHx:  No past medical history on file.  PSHx:    Past Surgical History:   Procedure Laterality Date     ESOPHAGOSCOPY, GASTROSCOPY, DUODENOSCOPY (EGD), COMBINED  05/05/2011     Immunizations:   Immunization History   Administered Date(s) Administered     COVID-19,PF,Pfizer (12+ Yrs) 01/30/2021, 02/23/2021     FLU 6-35 months 10/10/2009     Flu 65+ Years 10/13/2017     Influenza (High Dose) 3 valent vaccine 09/14/2015, 10/27/2016, 09/04/2018, 10/08/2019     Influenza (IIV3) PF 11/10/2011     Influenza, Quad, High Dose, Pf, 65yr+ (Fluzone HD) 10/08/2020     Influenza, Whole Virus 10/16/2013     Pneumo Conj 13-V (2010&after) 07/23/2015     Pneumococcal 23 valent 10/31/2012     Tdap (Adacel,Boostrix) 10/31/2012       ROS A comprehensive review of systems was performed and was otherwise negative    Medications, allergies, and problem list were reviewed and updated    Exam  BP (!) 144/76 (BP Location: Right arm, Patient Position: Sitting)   Pulse 71   Ht 1.803 m (5' 11\")   Wt 109.3 kg (241 lb)   SpO2 98%   BMI 33.61 kg/m    He appears well.  Heart is regular without murmur.  No edema.    Assessment/Plan  1. Essential hypertension  Highly labile blood pressure with significant orthostatic changes especially in the morning.    I will have him stop his nifedipine entirely at this time.  I did warn patient on risk of hypertension can occur with stopping nifedipine.    If he does have significant spikes in his systolic blood pressure, I would have him consider starting amlodipine 2.5 mg a day instead of nifedipine.    Continue on the losartan 25 mg twice a day at this time with his nephropathy and diabetes.  Plan to recheck kidney labs on September 8 at his next " visit.    Continue carvedilol 3.125 mg twice daily.    2. Chronic renal impairment, stage 3b (H)  Kidney function improved slightly back to stage IIIb at last check.  He has a nephrology appointment on September 16.    Continue losartan 25 mg twice daily.    3. Coronary artery disease due to calcified coronary lesion  Asymptomatic.  Lipitor 40 mg and aspirin daily.    4. History of syncope  Consistent with orthostatic hypotension    5. Type 2 diabetes mellitus with other specified complication, with long-term current use of insulin (H)  Borderline controlled.  I will have him start checking blood sugars.  Follow-up on September 8, plan hemoglobin A1c at that time.    6. Type 2 diabetes mellitus with other specified complication, without long-term current use of insulin (H)  Continue current Ozempic and Amaryl  - Glucose Blood (BLOOD GLUCOSE TEST STRIPS) STRP; 1 strip by Subdermal route daily before breakfast Contour Next Strips  Dispense: 100 strip; Refill: 11    History of meningioma, noted incidentally on MRI last month.  No symptoms.  Plan repeat MRI in 6 to 12 months to document stability.    History of peripheral neuropathy with suspected sleep apnea, patient has stopped drinking alcohol.    Patient does not wish to get a second COVID booster today.      Return in 6 weeks (on 9/8/2022) for Follow up.   Patient Instructions   Stop nifedipine.    Continue losartan at 25 mg twice a day, and continue carvedilol and other medication the same.    Check blood pressure.  If blood pressure is running above 150/85 more than occasionally, contact me and I would have you consider restarting a calcium channel blocker with a slightly different calcium channel blocker, called amlodipine at 2.5 mg a day.    Follow-up with me on September 8 as planned.    Check blood sugars before breakfast, also can check before supper occasionally.  Record those numbers and bring list of blood sugars to your next visit.        Dimitri Ma,  MD, MD        Current Outpatient Medications   Medication Sig Dispense Refill     aspirin 325 MG tablet Take 325 mg by mouth daily       atorvastatin (LIPITOR) 40 MG tablet [ATORVASTATIN (LIPITOR) 40 MG TABLET] TAKE 1 TABLET BY MOUTH AT BEDTIME 90 tablet 3     blood glucose calibration (NO BRAND SPECIFIED) solution Use to calibrate blood glucose monitor as needed as directed. 1 each 1     carvedilol (COREG) 3.125 MG tablet Take 1 tablet (3.125 mg) by mouth 2 times daily (with meals) 180 tablet 2     febuxostat (ULORIC) 40 MG TABS tablet Take 1 tablet (40 mg) by mouth daily (Patient taking differently: Take 40 mg by mouth every other day) 90 tablet 3     glimepiride (AMARYL) 2 MG tablet [GLIMEPIRIDE (AMARYL) 2 MG TABLET] Take 2 tablets (4 mg) by mouth every morning with meal. 180 tablet 3     Glucose Blood (BLOOD GLUCOSE TEST STRIPS) STRP 1 strip by Subdermal route daily before breakfast Contour Next Strips 100 strip 11     losartan (COZAAR) 50 MG tablet Take 0.5 tablets (25 mg) by mouth 2 times daily       multivitamin, therapeutic (THERA-VIT) TABS tablet Take 1 tablet by mouth daily       nitroGLYcerin (NITROSTAT) 0.4 MG sublingual tablet [NITROGLYCERIN (NITROSTAT) 0.4 MG SL TABLET] DISSOLVE ONE TABLET UNDER THE TONGUE EVERY 5 MINUTES AS NEEDED FOR CHEST PAIN.  DO NOT EXCEED A TOTAL OF 3 DOSES IN 15 MINUTES 25 tablet 2     semaglutide (OZEMPIC, 0.25 OR 0.5 MG/DOSE,) 2 MG/1.5ML SOPN pen Inject 0.5 mg Subcutaneous once a week 4.5 mL 4     Allergies   Allergen Reactions     Allopurinol Other (See Comments)     Other reaction(s): Chest Tightness, Gave pt pains in chest like a heart attack, Pain     Social History     Tobacco Use     Smoking status: Former Smoker     Packs/day: 1.50     Years: 15.00     Pack years: 22.50     Types: Cigarettes     Quit date: 1981     Years since quittin.5     Smokeless tobacco: Never Used   Substance Use Topics     Alcohol use: Yes     Alcohol/week: 14.0 standard drinks              Grey Almanzar is a 81 year old, presenting for the following health issues:  Follow Up (Blood pressure- not fasting)      History of Present Illness       Diabetes:   He presents for follow up of diabetes.  He is checking home blood glucose a few times a month. He checks blood glucose before meals.  Blood glucose is never over 200 and never under 70. When his blood glucose is low, the patient is asymptomatic for confusion, blurred vision, lethargy and reports not feeling dizzy, shaky, or weak.  He has no concerns regarding his diabetes at this time.  He is having numbness in feet.         Hypertension: He presents for follow up of hypertension.  He does check blood pressure  regularly outside of the clinic. Outpatient blood pressures have not been over 140/90. He does not follow a low salt diet.               Review of Systems         Objective    There were no vitals taken for this visit.  There is no height or weight on file to calculate BMI.  Physical Exam                       .  ..

## 2022-07-28 DIAGNOSIS — E11.9 TYPE 2 DIABETES MELLITUS WITHOUT COMPLICATION, WITHOUT LONG-TERM CURRENT USE OF INSULIN (H): Primary | ICD-10-CM

## 2022-07-28 NOTE — TELEPHONE ENCOUNTER
Insurance will only cover one time daily testing. Please send new prescription for strips to pharmacy. Order pended.

## 2022-08-09 ENCOUNTER — TELEPHONE (OUTPATIENT)
Dept: INTERNAL MEDICINE | Facility: CLINIC | Age: 82
End: 2022-08-09

## 2022-08-09 ENCOUNTER — NURSE TRIAGE (OUTPATIENT)
Dept: NURSING | Facility: CLINIC | Age: 82
End: 2022-08-09

## 2022-08-09 NOTE — TELEPHONE ENCOUNTER
Patient mailed me a list of blood pressures.  On July 27 patient had his nifedipine CC 30 mg a day discontinued entirely.    He has a history of labile hypertension and chronic renal insufficiency.  History of orthostatic hypotension.    Blood pressures have been variable between 92/47-1 59/77.  Most numbers appear to be in the 90s over 50s standing and sitting in the 140s/60s.  Heart rate in the 70s.    Recent blood sugars have been in the 1  range.    Blood pressure is not running severely high after stopping calcium channel blocker and I will not have him start amlodipine.    Follow-up on September 8 as planned.

## 2022-08-09 NOTE — TELEPHONE ENCOUNTER
Nurse Triage SBAR             Is this a 2nd Level Triage? YES, LICENSED PRACTITIONER REVIEW IS REQUIRED    Situation:   Patient calling about existing BP issues- states he has been having increasing episodes of fainting, wife concerned about the increase in these episodes     Background:   Has been having issues with low BP, in ER after falls- PCP has been following and making changes to meds    BG and BP record- states he dropped his readings off at the clinic on Friday- has not heard back, wants to be sure that PCP has seen them    This morning he had an episode where he felt very flushed - was getting out of bed- states he got a head rush and passed out when he was getting to the bathroom this AM and hit his head on the toilet   BP when he was laying down today after he fell 180/70   No lumps or bumps on his head from the fall, states all he has is a half an inch abrasion on his face  No headaches    Wife is concerned that patient is having worsening episodes- gets dizzy and light headed where she will have to catch him with a chair if she can   Current /76   States patient had an energy drink this AM- premier protein energy and immune drink- gets at [employer]   States he is not feeling dizzy currently- states he has been taking more time when he is trying to stand up      Assessment:   Triage protocol indicate second level triage is warranted, suggesting a ER/UCC or office with PCP approval, patient and wife are only interested in speaking with PCP about next steps at this time     Protocol Recommended Disposition:   No disposition on file.    Recommendation:   Message to PCP- please review and advise on next steps for patient.      Routed to provider    Does the patient meet one of the following criteria for ADS visit consideration? 16+ years old, with an [facility] PCP     TIP  Providers, please consider if this condition is appropriate for management at one of our Acute and Diagnostic Services sites.     If  patient is a good candidate, please use dotphrase <dot>triageresponse and select Refer to ADS to document.    Reason for Disposition    Brief dizziness or lightheadedness after standing up or eating    Knocked out (unconscious) < 1 minute and now fine    Additional Information    Negative: Systolic BP < 90 and feeling dizzy, lightheaded, or weak    Negative: Started suddenly after an allergic medicine, an allergic food, or bee sting    Negative: Shock suspected (e.g., cold/pale/clammy skin, too weak to stand, low BP, rapid pulse)    Negative: Difficult to awaken or acting confused (e.g., disoriented, slurred speech)    Negative: Fainted    Negative: Chest pain    Negative: Bleeding (e.g., vomiting blood, rectal bleeding or tarry stools, severe vaginal bleeding)    Negative: Extra heart beats or heart is beating fast (i.e., 'palpitations')    Negative: Sounds like a life-threatening emergency to the triager    Negative: Systolic BP < 80 and NOT dizzy, lightheaded or weak (feels normal)    Negative: Abdominal pain    Negative: Major surgery in the past month    Negative: Fever > 100.4 F (38.0 C)    Negative: Drinking very little and has signs of dehydration (e.g., no urine > 12 hours, very dry mouth, very lightheaded)    Negative: Fall in systolic BP > 20 mm Hg from normal and feeling dizzy, lightheaded, or weak    Negative: Patient sounds very sick or weak to the triager    Negative: Systolic BP < 90 and NOT dizzy, lightheaded or weak    Negative: Systolic BP  while taking blood pressure medications and NOT dizzy, lightheaded or weak    Negative: Fall in systolic BP > 20 mm Hg from normal and NOT dizzy, lightheaded, or weak    Negative: Fall in systolic BP > 20 mmHg after standing up    Negative: Diastolic BP < 50 mm Hg    Negative: ACUTE NEUROLOGIC SYMPTOM and symptom present now    Negative: Knocked out (unconscious) > 1 minute    Negative: Seizure (convulsion) occurred  (Exception: Prior history of seizures  and now alert and without Acute Neurologic Symptoms.)    Negative: Neck pain after dangerous injury (e.g., MVA, diving, trampoline, contact sports, fall > 10 feet or 3 meters)  (Exception: Neck pain began > 1 hour after injury.)    Negative: Major bleeding (actively dripping or spurting) that can't be stopped    Negative: Penetrating head injury (e.g., knife, gunshot wound, metal object)    Negative: Sounds like a life-threatening emergency to the triager    Negative: Diagnosed with a concussion within last 14 days    Negative: Can't remember what happened (amnesia)    Negative: Vomiting once or more    Negative: Watery or blood-tinged fluid dripping from the nose or ears    Negative: ACUTE NEUROLOGIC SYMPTOM and now fine    Protocols used: BLOOD PRESSURE - LOW-A-OH, HEAD INJURY-A-OH

## 2022-08-09 NOTE — TELEPHONE ENCOUNTER
Provider Recommendation Follow Up:   Reached patient/caregiver. Informed of provider's recommendations. Patient verbalized understanding and agrees with the plan.     Patient scheduled for follow-up with Dr. Ma tomorrow - 8/10/22 at 3:20pm.    Kiara Anne RN, BSN  [facility]

## 2022-08-09 NOTE — TELEPHONE ENCOUNTER
Have him reduce the losartan down to 25 mg once a day, and have him follow-up in clinic this week with me, use one of my hold spots tomorrow.

## 2022-08-10 ENCOUNTER — OFFICE VISIT (OUTPATIENT)
Dept: INTERNAL MEDICINE | Facility: CLINIC | Age: 82
End: 2022-08-10
Payer: MEDICARE

## 2022-08-10 VITALS
HEIGHT: 71 IN | WEIGHT: 241 LBS | OXYGEN SATURATION: 99 % | SYSTOLIC BLOOD PRESSURE: 110 MMHG | HEART RATE: 77 BPM | BODY MASS INDEX: 33.74 KG/M2 | DIASTOLIC BLOOD PRESSURE: 60 MMHG

## 2022-08-10 DIAGNOSIS — R53.83 FATIGUE, UNSPECIFIED TYPE: ICD-10-CM

## 2022-08-10 DIAGNOSIS — N18.32 CHRONIC RENAL IMPAIRMENT, STAGE 3B (H): ICD-10-CM

## 2022-08-10 DIAGNOSIS — I25.10 CORONARY ARTERY DISEASE DUE TO CALCIFIED CORONARY LESION: ICD-10-CM

## 2022-08-10 DIAGNOSIS — R55 SYNCOPE, UNSPECIFIED SYNCOPE TYPE: ICD-10-CM

## 2022-08-10 DIAGNOSIS — G62.9 PERIPHERAL POLYNEUROPATHY: ICD-10-CM

## 2022-08-10 DIAGNOSIS — I25.84 CORONARY ARTERY DISEASE DUE TO CALCIFIED CORONARY LESION: ICD-10-CM

## 2022-08-10 DIAGNOSIS — Z51.81 ENCOUNTER FOR THERAPEUTIC DRUG MONITORING: ICD-10-CM

## 2022-08-10 DIAGNOSIS — I10 ESSENTIAL HYPERTENSION: ICD-10-CM

## 2022-08-10 DIAGNOSIS — E11.43 TYPE 2 DIABETES MELLITUS WITH DIABETIC AUTONOMIC NEUROPATHY, WITHOUT LONG-TERM CURRENT USE OF INSULIN (H): Primary | ICD-10-CM

## 2022-08-10 LAB
ERYTHROCYTE [DISTWIDTH] IN BLOOD BY AUTOMATED COUNT: 12.9 % (ref 10–15)
HCT VFR BLD AUTO: 36.5 % (ref 40–53)
HGB BLD-MCNC: 12.3 G/DL (ref 13.3–17.7)
MCH RBC QN AUTO: 31.4 PG (ref 26.5–33)
MCHC RBC AUTO-ENTMCNC: 33.7 G/DL (ref 31.5–36.5)
MCV RBC AUTO: 93 FL (ref 78–100)
PLATELET # BLD AUTO: 240 10E3/UL (ref 150–450)
RBC # BLD AUTO: 3.92 10E6/UL (ref 4.4–5.9)
WBC # BLD AUTO: 7.9 10E3/UL (ref 4–11)

## 2022-08-10 PROCEDURE — 36415 COLL VENOUS BLD VENIPUNCTURE: CPT | Performed by: INTERNAL MEDICINE

## 2022-08-10 PROCEDURE — 99214 OFFICE O/P EST MOD 30 MIN: CPT | Performed by: INTERNAL MEDICINE

## 2022-08-10 PROCEDURE — 84443 ASSAY THYROID STIM HORMONE: CPT | Performed by: INTERNAL MEDICINE

## 2022-08-10 PROCEDURE — 85027 COMPLETE CBC AUTOMATED: CPT | Performed by: INTERNAL MEDICINE

## 2022-08-10 PROCEDURE — 80053 COMPREHEN METABOLIC PANEL: CPT | Performed by: INTERNAL MEDICINE

## 2022-08-10 PROCEDURE — 83735 ASSAY OF MAGNESIUM: CPT | Performed by: INTERNAL MEDICINE

## 2022-08-10 PROCEDURE — 82607 VITAMIN B-12: CPT | Performed by: INTERNAL MEDICINE

## 2022-08-10 RX ORDER — LOSARTAN POTASSIUM 50 MG/1
25 TABLET ORAL DAILY
Start: 2022-08-10 | End: 2022-08-16

## 2022-08-10 NOTE — PROGRESS NOTES
Yohan Hayward   81 year old male    Date of Visit: 8/10/2022    Chief Complaint   Patient presents with     Follow Up     Blood pressure- syncope episode yesterday     Subjective  Yohan is here with his wife, Yumi.  Here for follow-up of recurrent orthostatic syncopal episodes and orthostatic hypotension.    He has a past history of chronic alcohol use and peripheral neuropathy, and diabetes.    He has had severe peripheral neuropathy for many years.    He had an orthostatic syncopal spell 2 years ago.  Has had fluctuating blood pressure in the past.    Patient stopped drinking alcohol about 2 to 3 months ago, and has been having lower blood pressures and increasing orthostatic symptoms.  With recurrent orthostatic syncope since.    Patient does have a history of hypertensive encephalopathy with punctate occipital and left parietal strokes in 2014.  Obesity with history of alcohol suspected sleep apnea but has not had evaluation for that.    I did review the MRI from June 2022, which showed some generalized atrophy, mild and microvascular ischemic changes but no hydrocephalus.  Some mild cerebellar atrophy.  No previous stroke noted.  There was what was likely a meningioma of the right frontal convexity, without mass-effect.    He has not had seizures or seizure like activity.  He did attempt a course of empiric Keppra but did not tolerate it.    He had previously had orthostatic lightheaded spells and had nifedipine dose reduced from 60 mg down to 30 mg with improved lightheadedness.    On June 8 of this year he was on losartan 50 mg twice a day, carvedilol 3.125 mg twice a day and nifedipine CC 30 mg a day.    Patient had a hospitalization in June after he had a syncopal spell, right after he got up in the morning,  had come over and he was helping him with a ladder, had a syncopal event.    In the hospital he had a heart echo on June 27 that was normal.  No aortic stenosis.    Rule  out for an MI.  He has not been having any chest pain.  He does have a history of class II angina with exertion, but no escalating angina episodes or recent angina episodes.  Last stress test was 2019 and was negative.    No palpitations or history of arrhythmia.    The syncopal episodes have all been orthostatic in nature.    On July 11 he lowered his dose of losartan from 50 mg twice a day down to 25 mg twice a day.    On July 27 I had him stop the nifedipine entirely.    He had another syncopal episode yesterday in the bathroom.  He got up in the morning and walked to the bathroom, while urinating had a syncopal episode.  No residual neurologic deficits.    His blood pressure was 180/75 sitting after that episode yesterday.    Later that morning his blood pressure was 171/67 sitting but when he stood up it was 100/56.    This morning he had a mild orthostatic spell in the kitchen but did not faint.  Sitting blood pressure was 102/58 and standing blood pressure 97/38.  Heart rate in the 60s to 70s and stable.    He has had consistent pattern with orthostatic changes on other days.    Patient is eating and drinking on a regular basis.  He denies any stomach upset, diarrhea or bowel changes.  No abdominal pain.  No blood in stool or melena.  There is been no bleeding issues.    No new cough or fever.    He has stage IIIb renal insufficiency with creatinine slightly improved on last check at 1.9.    Diabetes type 2 has been borderline controlled.  Hemoglobin A1c in June was 7.6%.  Blood sugars are now running in the 1  range, however.  He is on Ozempic 0.5 mg weekly and Amaryl 4 mg daily.  He denies any low blood sugar events.  He denies any GI symptoms with the Ozempic, that has resolved since his develop tolerance to that medicine.    No urinary symptoms.    PMHx:  No past medical history on file.  PSHx:    Past Surgical History:   Procedure Laterality Date     ESOPHAGOSCOPY, GASTROSCOPY, DUODENOSCOPY (EGD),  "COMBINED  05/05/2011     Immunizations:   Immunization History   Administered Date(s) Administered     COVID-19,PF,Pfizer (12+ Yrs) 01/30/2021, 02/23/2021     FLU 6-35 months 10/10/2009     Flu 65+ Years 10/13/2017     Influenza (High Dose) 3 valent vaccine 09/14/2015, 10/27/2016, 09/04/2018, 10/08/2019     Influenza (IIV3) PF 11/10/2011     Influenza, Quad, High Dose, Pf, 65yr+ (Fluzone HD) 10/08/2020     Influenza, Whole Virus 10/16/2013     Pneumo Conj 13-V (2010&after) 07/23/2015     Pneumococcal 23 valent 10/31/2012     Tdap (Adacel,Boostrix) 10/31/2012       ROS A comprehensive review of systems was performed and was otherwise negative    Medications, allergies, and problem list were reviewed and updated    Exam  /60   Pulse 77   Ht 1.803 m (5' 11\")   Wt 109.3 kg (241 lb)   SpO2 99%   BMI 33.61 kg/m    Blood pressure lying down was 160/80 for me.  Upon standing up it dropped to 110/60, left arm.  He denied significant dizzy spells with that change.  Heart is regular without murmur, no premature beats.  No ankle edema.  Abdomen is obese but nontender.  No carotid bruits.  No JVD.  Lungs are clear.    Assessment/Plan  1. 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    I suspect he will continue to have orthostatic blood pressure changes with this autonomic neuropathy condition.    Diabetes is not adequately controlled.  I would like to see him increase the Ozempic to 1 mg a week in the future, once his blood pressure condition stabilizes.  Continue glimepiride at this time as long as no low blood sugar events.  Due for hemoglobin A1c check at his September 8 visit.    Did see ophthalmology in June of this year and no retinopathy noted    2. Peripheral polyneuropathy  Peripheral " neuropathy, with suspected autonomic neuropathy as well, associated with conditions above.  Normal B12 level last year, but recheck  - Vitamin B12    3. Essential hypertension  He does have high blood pressure, history of high blood pressure associated strokes in 2014 that may be associated with sleep apnea at that time.    Up with his recurrent orthostatic hypotension syncopal episodes, he will need to tolerate a higher blood pressure supine in order to avoid low blood pressure standing.    Will stay on the lower dose of losartan 25 mg once a day at this time.  If he has recurrent low blood pressure spells less than 110/60 or syncope, I would plan to discontinue losartan entirely.    Continue carvedilol 3.125 mg twice daily at this time  - losartan (COZAAR) 50 MG tablet; Take 0.5 tablets (25 mg) by mouth daily    4. Chronic renal impairment, stage 3b (H)  Recheck labs today.  Follows up with nephrology in September.  - Comprehensive metabolic panel    5. Coronary artery disease due to calcified coronary lesion  Class II angina.  Patient was warned to avoid nitroglycerin, as there is a risk of low blood pressure and syncope with nitroglycerin use.    Continues on aspirin and Lipitor 40 mg    6. Syncope, unspecified syncope type  Consistent with orthostatic syncope/associated with micturition syncope yesterday morning.    Adjusting blood pressure medicine as above.  Emphasized the importance of staying well-hydrated.    He does not have lower extremity edema, although we did discuss the option of wearing compression stockings for his legs.    We discussed getting up slowly, pumping legs before changing position.    Rule out carotid artery stenosis.  Rule out heart arrhythmia with heart monitor for 7 days.  - US Carotid Bilateral; Future  - Adult Cardiac Event Monitor; Future  - CBC with platelets  - Magnesium    7. Encounter for therapeutic drug monitoring    - Comprehensive metabolic panel    8. Fatigue, unspecified  type  Consistent with sleep apnea.  Could consider referral to sleep clinic in the future.  - TSH      Return in about 1 week (around 8/17/2022) for Follow up.   Patient Instructions   I suspect you have autonomic neuropathy as a cause of your low blood pressures when you stand up.    Continue on the low-dose losartan 25 mg once a day, with food or after breakfast.    If you continue to have blood pressures less than 100/60, I would have you stop losartan entirely.    If you begin to have blood pressures above 180/90, contact me in clinic.    Follow-up in clinic next week, bring your blood pressure cuff with you.    See well-hydrated with regular meals at all times, in order to avoid any dehydration.    Moves slowly when getting up from a sitting or lying down position.  Pump legs or get warmed up before standing or walking.    Schedule carotid ultrasound to rule out carotid artery stenosis.    Set up a heart monitor for 1 week to monitor your heart rhythm, to make sure there is not a heart arrhythmia associated with his fainting spells.    Look on your [email or web address] computer portal for results of today's lab work.      Dimitri Ma MD, MD        Current Outpatient Medications   Medication Sig Dispense Refill     aspirin 325 MG tablet Take 325 mg by mouth daily       atorvastatin (LIPITOR) 40 MG tablet [ATORVASTATIN (LIPITOR) 40 MG TABLET] TAKE 1 TABLET BY MOUTH AT BEDTIME 90 tablet 3     blood glucose (NO BRAND SPECIFIED) test strip Use to test blood sugar 1 time daily or as directed. 100 strip 3     blood glucose calibration (NO BRAND SPECIFIED) solution Use to calibrate blood glucose monitor as needed as directed. 1 each 1     carvedilol (COREG) 3.125 MG tablet Take 1 tablet (3.125 mg) by mouth 2 times daily (with meals) 180 tablet 2     febuxostat (ULORIC) 40 MG TABS tablet Take 1 tablet (40 mg) by mouth daily (Patient taking differently: Take 40 mg by mouth every other day) 90 tablet 3     glimepiride (AMARYL) 2 MG  tablet [GLIMEPIRIDE (AMARYL) 2 MG TABLET] Take 2 tablets (4 mg) by mouth every morning with meal. 180 tablet 3     Glucose Blood (BLOOD GLUCOSE TEST STRIPS) STRP 1 strip by Subdermal route daily before breakfast Contour Next Strips 100 strip 11     losartan (COZAAR) 50 MG tablet Take 0.5 tablets (25 mg) by mouth daily       multivitamin, therapeutic (THERA-VIT) TABS tablet Take 1 tablet by mouth daily       nitroGLYcerin (NITROSTAT) 0.4 MG sublingual tablet [NITROGLYCERIN (NITROSTAT) 0.4 MG SL TABLET] DISSOLVE ONE TABLET UNDER THE TONGUE EVERY 5 MINUTES AS NEEDED FOR CHEST PAIN.  DO NOT EXCEED A TOTAL OF 3 DOSES IN 15 MINUTES 25 tablet 2     semaglutide (OZEMPIC, 0.25 OR 0.5 MG/DOSE,) 2 MG/1.5ML SOPN pen Inject 0.5 mg Subcutaneous once a week 4.5 mL 4     Allergies   Allergen Reactions     Allopurinol Other (See Comments)     Other reaction(s): Chest Tightness, Gave pt pains in chest like a heart attack, Pain     Social History     Tobacco Use     Smoking status: Former Smoker     Packs/day: 1.50     Years: 15.00     Pack years: 22.50     Types: Cigarettes     Quit date: 1981     Years since quittin.6     Smokeless tobacco: Never Used   Substance Use Topics     Alcohol use: Yes     Alcohol/week: 14.0 standard drinks             Grey Almanzar is a 81 year old, presenting for the following health issues:  Follow Up (Blood pressure- syncope episode yesterday)      HPI           Review of Systems         Objective    There were no vitals taken for this visit.  There is no height or weight on file to calculate BMI.  Physical Exam                       .  ..

## 2022-08-10 NOTE — PATIENT INSTRUCTIONS
I suspect you have autonomic neuropathy as a cause of your low blood pressures when you stand up.    Continue on the low-dose losartan 25 mg once a day, with food or after breakfast.    If you continue to have blood pressures less than 100/60, I would have you stop losartan entirely.    If you begin to have blood pressures above 180/90, contact me in clinic.    Follow-up in clinic next week, bring your blood pressure cuff with you.    See well-hydrated with regular meals at all times, in order to avoid any dehydration.    Moves slowly when getting up from a sitting or lying down position.  Pump legs or get warmed up before standing or walking.    Schedule carotid ultrasound to rule out carotid artery stenosis.    Set up a heart monitor for 1 week to monitor your heart rhythm, to make sure there is not a heart arrhythmia associated with his fainting spells.    Look on your [employer] computer portal for results of today's lab work.

## 2022-08-11 LAB
ALBUMIN SERPL BCG-MCNC: 4.4 G/DL (ref 3.5–5.2)
ALP SERPL-CCNC: 80 U/L (ref 40–129)
ALT SERPL W P-5'-P-CCNC: 35 U/L (ref 10–50)
ANION GAP SERPL CALCULATED.3IONS-SCNC: 13 MMOL/L (ref 7–15)
AST SERPL W P-5'-P-CCNC: 25 U/L (ref 10–50)
BILIRUB SERPL-MCNC: 0.3 MG/DL
BUN SERPL-MCNC: 43 MG/DL (ref 8–23)
CALCIUM SERPL-MCNC: 9.5 MG/DL (ref 8.8–10.2)
CHLORIDE SERPL-SCNC: 102 MMOL/L (ref 98–107)
CREAT SERPL-MCNC: 1.92 MG/DL (ref 0.67–1.17)
DEPRECATED HCO3 PLAS-SCNC: 23 MMOL/L (ref 22–29)
GFR SERPL CREATININE-BSD FRML MDRD: 35 ML/MIN/1.73M2
GLUCOSE SERPL-MCNC: 349 MG/DL (ref 70–99)
MAGNESIUM SERPL-MCNC: 2 MG/DL (ref 1.7–2.3)
POTASSIUM SERPL-SCNC: 5.1 MMOL/L (ref 3.4–5.3)
PROT SERPL-MCNC: 6.6 G/DL (ref 6.4–8.3)
SODIUM SERPL-SCNC: 138 MMOL/L (ref 136–145)
TSH SERPL DL<=0.005 MIU/L-ACNC: 1.18 UIU/ML (ref 0.3–4.2)
VIT B12 SERPL-MCNC: 592 PG/ML (ref 232–1245)

## 2022-08-12 ENCOUNTER — HOSPITAL ENCOUNTER (OUTPATIENT)
Dept: ULTRASOUND IMAGING | Facility: CLINIC | Age: 82
Discharge: HOME | End: 2022-08-12
Attending: INTERNAL MEDICINE | Admitting: INTERNAL MEDICINE
Payer: MEDICARE

## 2022-08-12 DIAGNOSIS — R55 SYNCOPE, UNSPECIFIED SYNCOPE TYPE: ICD-10-CM

## 2022-08-12 PROCEDURE — 93880 EXTRACRANIAL BILAT STUDY: CPT

## 2022-08-16 ENCOUNTER — OFFICE VISIT (OUTPATIENT)
Dept: INTERNAL MEDICINE | Facility: CLINIC | Age: 82
End: 2022-08-16
Payer: MEDICARE

## 2022-08-16 VITALS
DIASTOLIC BLOOD PRESSURE: 68 MMHG | BODY MASS INDEX: 33.6 KG/M2 | HEIGHT: 71 IN | WEIGHT: 240 LBS | HEART RATE: 74 BPM | SYSTOLIC BLOOD PRESSURE: 138 MMHG | OXYGEN SATURATION: 99 %

## 2022-08-16 DIAGNOSIS — I25.10 CORONARY ARTERY DISEASE DUE TO CALCIFIED CORONARY LESION: ICD-10-CM

## 2022-08-16 DIAGNOSIS — I25.84 CORONARY ARTERY DISEASE DUE TO CALCIFIED CORONARY LESION: ICD-10-CM

## 2022-08-16 DIAGNOSIS — R55 SYNCOPE, UNSPECIFIED SYNCOPE TYPE: ICD-10-CM

## 2022-08-16 DIAGNOSIS — N18.30 CHRONIC RENAL INSUFFICIENCY, STAGE 3 (MODERATE) (H): ICD-10-CM

## 2022-08-16 DIAGNOSIS — E11.43 TYPE 2 DIABETES MELLITUS WITH DIABETIC AUTONOMIC NEUROPATHY, WITHOUT LONG-TERM CURRENT USE OF INSULIN (H): Primary | ICD-10-CM

## 2022-08-16 PROCEDURE — 99214 OFFICE O/P EST MOD 30 MIN: CPT | Performed by: INTERNAL MEDICINE

## 2022-08-16 NOTE — PATIENT INSTRUCTIONS
Stop carvedilol.  If your blood pressure does get above 160/85, you can restart the carvedilol.    Continue to stay well-hydrated.  You can eat more salty foods.  Drink a glass of water first thing in the morning before you get out of bed.  Continue to move your legs and move your body to warm up in the morning before standing up.    Contact cardiology department to set up your heart monitor, when you are back in [place].    See me September 8 for follow-up appointment.    Continue to improve your diabetic diet.  We will discuss increasing Ozempic at future visit.

## 2022-08-16 NOTE — PROGRESS NOTES
Yohan Hayward   81 year old male    Date of Visit: 8/16/2022    Chief Complaint   Patient presents with     Hypotension     Subjective wife, Yumi, not present  Yohan is here for follow-up on his orthostatic hypotension with recurrent syncope.    History of chronic alcohol use, stopped drinking about 3 months ago.    He has history of poorly controlled diabetes and severe peripheral neuropathy.  Suspected sleep apnea but has not had evaluation yet.    Past history of hypertension, he had been on high-dose losartan and carvedilol 3.125 mg twice a day and previously on 60 mg of nifedipine CC    He began having severe orthostatic hypotension with recurrent syncopal episodes.  He first weaned off the nifedipine.  He is now weaned down and now off the losartan.  He stopped losartan completely on August 14 because of continued low blood pressures in the morning and another syncopal episode in the morning on August 14.    Patient continues to have lower blood pressures in the morning at home down to 90s over 50s.  In the afternoon up to 145/65.  Has not had any high spiking blood pressures.    He does have a past history of high spiking blood pressures with hypertensive encephalopathy and punctate strokes in 2014.    He also has coronary artery disease with class II angina but has not had recurrent chest pain recently.  He is having less chest pain since his blood pressure has increased.  He has not taken any nitroglycerin, and and we have discussed risk of syncope with use of nitroglycerin.    He has not had palpitations or arrhythmia.    He has had syncope in June, multiple episodes since then, including 2 days ago.    Patient has been hydrating better.  He has been eating regular meals and watching his diabetes better.  His blood sugars have come down mostly less than 200 now.  Usually around 180 in the morning, 120 before supper.    Still on Ozempic 0.5 mg weekly and Amaryl 4 mg daily.  No low blood sugar  "events.  Hemoglobin A1c level was 7.6% in June.    Chronic renal insufficiency with a creatinine of 1.9 on August 10, stable.    No increased edema or shortness of breath.    August 10 labs reviewed with hemoglobin stable at 12.3.  There is been no bleeding issues.  Normal B12 level of 592.  Magnesium and potassium level normal.  Blood sugar over 300 at that time.    PMHx:  No past medical history on file.  PSHx:    Past Surgical History:   Procedure Laterality Date     ESOPHAGOSCOPY, GASTROSCOPY, DUODENOSCOPY (EGD), COMBINED  05/05/2011     Immunizations:   Immunization History   Administered Date(s) Administered     COVID-19,PF,Pfizer (12+ Yrs) 01/30/2021, 02/23/2021     FLU 6-35 months 10/10/2009     Flu 65+ Years 10/13/2017     Influenza (High Dose) 3 valent vaccine 09/14/2015, 10/27/2016, 09/04/2018, 10/08/2019     Influenza (IIV3) PF 11/10/2011     Influenza, Quad, High Dose, Pf, 65yr+ (Fluzone HD) 10/08/2020     Influenza, Whole Virus 10/16/2013     Pneumo Conj 13-V (2010&after) 07/23/2015     Pneumococcal 23 valent 10/31/2012     Tdap (Adacel,Boostrix) 10/31/2012       ROS A comprehensive review of systems was performed and was otherwise negative    Medications, allergies, and problem list were reviewed and updated    Exam  /68 (BP Location: Right arm, Patient Position: Standing)   Pulse 74   Ht 1.803 m (5' 11\")   Wt 108.9 kg (240 lb)   SpO2 99%   BMI 33.47 kg/m    He appears well bright and alert.    His blood pressure cuff showed 164/74.    Supine blood pressure 132/70 and standing blood pressure 138/68    Lungs are clear.  Heart is regular without murmur no edema    Assessment/Plan  1. 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  Long-term plan to increase Ozempic to 1 mg a day, can discuss that in September.  Continue Amaryl as long as no low blood sugars.  Chronic renal " sufficiency preventing use of metformin    Severe orthostatic changes, worse in the morning consistent with autonomic neuropathy related to his longstanding neuropathy with diabetes and sleep apnea suspect and history of alcohol.    2. Syncope, unspecified syncope type  Recurrent orthostatic syncope after getting out of bed and walking to the kitchen.  Similar to previous episodes.  No trauma.  No seizure activity.  No chest pain.    We discussed again getting out of bed slow in the morning.  To start drinking glass of water before getting out of bed.  He will be stopping carvedilol entirely now.    He still has not heard about setting up a heart monitor, another order was placed  - Adult Cardiac Event Monitor; Future    Heart echo in June was essentially normal, no aortic stenosis    3. Coronary artery disease due to calcified coronary lesion  No chest pain.  Aspirin and Lipitor 40 mg    Carotid ultrasound from earlier this month showed moderate left carotid artery stenosis 50-69% and mild on the right.  Continue medical management as above    4. Chronic renal insufficiency, stage 3 (moderate) (H)  Stable.  Follow-up with nephrology on September 16.    Suspected sleep apnea but he does not want to undergo a sleep evaluation yet.      Return in 23 days (on 9/8/2022) for Follow up.   Patient Instructions   Stop carvedilol.  If your blood pressure does get above 160/85, you can restart the carvedilol.    Continue to stay well-hydrated.  You can eat more salty foods.  Drink a glass of water first thing in the morning before you get out of bed.  Continue to move your legs and move your body to warm up in the morning before standing up.    Contact cardiology department to set up your heart monitor, when you are back in [place].    See me September 8 for follow-up appointment.    Continue to improve your diabetic diet.  We will discuss increasing Ozempic at future visit.    Dimitri Ma MD, MD        Current  Outpatient Medications   Medication Sig Dispense Refill     aspirin 325 MG tablet Take 325 mg by mouth daily       atorvastatin (LIPITOR) 40 MG tablet [ATORVASTATIN (LIPITOR) 40 MG TABLET] TAKE 1 TABLET BY MOUTH AT BEDTIME 90 tablet 3     blood glucose (NO BRAND SPECIFIED) test strip Use to test blood sugar 1 time daily or as directed. 100 strip 3     blood glucose calibration (NO BRAND SPECIFIED) solution Use to calibrate blood glucose monitor as needed as directed. 1 each 1     febuxostat (ULORIC) 40 MG TABS tablet Take 1 tablet (40 mg) by mouth daily (Patient taking differently: Take 40 mg by mouth every other day) 90 tablet 3     glimepiride (AMARYL) 2 MG tablet [GLIMEPIRIDE (AMARYL) 2 MG TABLET] Take 2 tablets (4 mg) by mouth every morning with meal. 180 tablet 3     Glucose Blood (BLOOD GLUCOSE TEST STRIPS) STRP 1 strip by Subdermal route daily before breakfast Contour Next Strips 100 strip 11     multivitamin, therapeutic (THERA-VIT) TABS tablet Take 1 tablet by mouth daily       nitroGLYcerin (NITROSTAT) 0.4 MG sublingual tablet [NITROGLYCERIN (NITROSTAT) 0.4 MG SL TABLET] DISSOLVE ONE TABLET UNDER THE TONGUE EVERY 5 MINUTES AS NEEDED FOR CHEST PAIN.  DO NOT EXCEED A TOTAL OF 3 DOSES IN 15 MINUTES 25 tablet 2     semaglutide (OZEMPIC, 0.25 OR 0.5 MG/DOSE,) 2 MG/1.5ML SOPN pen Inject 0.5 mg Subcutaneous once a week 4.5 mL 4     Allergies   Allergen Reactions     Allopurinol Other (See Comments)     Other reaction(s): Chest Tightness, Gave pt pains in chest like a heart attack, Pain     Social History     Tobacco Use     Smoking status: Former Smoker     Packs/day: 1.50     Years: 15.00     Pack years: 22.50     Types: Cigarettes     Quit date: 1981     Years since quittin.6     Smokeless tobacco: Never Used   Substance Use Topics     Alcohol use: Yes     Alcohol/week: 14.0 standard drinks             Grey Almanzar is a 81 year old, presenting for the following health  issues:  Hypotension      History of Present Illness       Reason for visit:  Low blood pressure    He eats 4 or more servings of fruits and vegetables daily.He consumes 0 sweetened beverage(s) daily.He exercises with enough effort to increase his heart rate 60 or more minutes per day.  He exercises with enough effort to increase his heart rate 5 days per week.   He is taking medications regularly.             Review of Systems         Objective    There were no vitals taken for this visit.  There is no height or weight on file to calculate BMI.  Physical Exam                       .  ..

## 2022-08-25 ENCOUNTER — HOSPITAL ENCOUNTER (OUTPATIENT)
Dept: CARDIOLOGY | Facility: CLINIC | Age: 82
Discharge: HOME | End: 2022-08-25
Attending: INTERNAL MEDICINE | Admitting: INTERNAL MEDICINE
Payer: MEDICARE

## 2022-08-25 DIAGNOSIS — R55 SYNCOPE, UNSPECIFIED SYNCOPE TYPE: ICD-10-CM

## 2022-08-25 PROCEDURE — 93270 REMOTE 30 DAY ECG REV/REPORT: CPT

## 2022-08-31 DIAGNOSIS — Z87.39 HISTORY OF GOUT: ICD-10-CM

## 2022-09-01 RX ORDER — FEBUXOSTAT 40 MG/1
40 TABLET, FILM COATED ORAL DAILY
Qty: 90 TABLET | Refills: 3 | Status: SHIPPED | OUTPATIENT
Start: 2022-09-01 | End: 2024-01-03

## 2022-09-01 NOTE — TELEPHONE ENCOUNTER
"Routing refill request to provider for review/approval because:  Labs out of range:  Creatinine  Labs not current:  Uric acid  Patient reports taking medication differently.    Last Written Prescription Date:  8/16/21  Last Fill Quantity: 90,  # refills: 3   Last office visit provider:  8/16/22     Requested Prescriptions   Pending Prescriptions Disp Refills     febuxostat (ULORIC) 40 MG TABS tablet 90 tablet 3     Sig: Take 1 tablet (40 mg) by mouth daily       Gout Agents Protocol Failed - 9/1/2022  8:13 AM        Failed - Has Uric Acid on file in past 12 months and value is less than 6     Recent Labs   Lab Test 09/04/19  1409   URIC 6.3     If level is 6mg/dL or greater, ok to refill one time and refer to provider.           Failed - Normal serum creatinine on file in the past 12 months     Recent Labs   Lab Test 08/10/22  1611   CR 1.92*       Ok to refill medication if creatinine is low          Passed - CBC on file in past 12 months     Recent Labs   Lab Test 08/10/22  1611   WBC 7.9   RBC 3.92*   HGB 12.3*   HCT 36.5*                    Passed - ALT on file in past 12 months     Recent Labs   Lab Test 08/10/22  1611   ALT 35             Passed - Recent (12 mo) or future (30 days) visit within the authorizing provider's specialty     Patient has had an office visit with the authorizing provider or a provider within the authorizing providers department within the previous 12 mos or has a future within next 30 days. See \"Patient Info\" tab in inbasket, or \"Choose Columns\" in Meds & Orders section of the refill encounter.              Passed - Medication is active on med list        Passed - Patient is age 18 or older             Sreekanth Flowers RN 09/01/22 8:13 AM  "

## 2022-09-06 PROCEDURE — 93228 REMOTE 30 DAY ECG REV/REPORT: CPT | Performed by: INTERNAL MEDICINE

## 2022-09-08 ENCOUNTER — OFFICE VISIT (OUTPATIENT)
Dept: INTERNAL MEDICINE | Facility: CLINIC | Age: 82
End: 2022-09-08
Payer: MEDICARE

## 2022-09-08 ENCOUNTER — TELEPHONE (OUTPATIENT)
Dept: INTERNAL MEDICINE | Facility: CLINIC | Age: 82
End: 2022-09-08

## 2022-09-08 VITALS
SYSTOLIC BLOOD PRESSURE: 158 MMHG | HEART RATE: 67 BPM | OXYGEN SATURATION: 96 % | DIASTOLIC BLOOD PRESSURE: 68 MMHG | WEIGHT: 239 LBS | BODY MASS INDEX: 33.33 KG/M2

## 2022-09-08 DIAGNOSIS — I25.84 CORONARY ARTERY DISEASE DUE TO CALCIFIED CORONARY LESION: ICD-10-CM

## 2022-09-08 DIAGNOSIS — Z23 NEED FOR IMMUNIZATION AGAINST INFLUENZA: ICD-10-CM

## 2022-09-08 DIAGNOSIS — N18.30 CHRONIC RENAL INSUFFICIENCY, STAGE 3 (MODERATE) (H): ICD-10-CM

## 2022-09-08 DIAGNOSIS — E11.43 TYPE 2 DIABETES MELLITUS WITH DIABETIC AUTONOMIC NEUROPATHY, WITHOUT LONG-TERM CURRENT USE OF INSULIN (H): Primary | ICD-10-CM

## 2022-09-08 DIAGNOSIS — I25.10 CORONARY ARTERY DISEASE DUE TO CALCIFIED CORONARY LESION: ICD-10-CM

## 2022-09-08 DIAGNOSIS — Z51.81 ENCOUNTER FOR THERAPEUTIC DRUG MONITORING: ICD-10-CM

## 2022-09-08 LAB
ALBUMIN SERPL BCG-MCNC: 4.2 G/DL (ref 3.5–5.2)
ALP SERPL-CCNC: 69 U/L (ref 40–129)
ALT SERPL W P-5'-P-CCNC: 29 U/L (ref 10–50)
ANION GAP SERPL CALCULATED.3IONS-SCNC: 9 MMOL/L (ref 7–15)
AST SERPL W P-5'-P-CCNC: 28 U/L (ref 10–50)
BILIRUB SERPL-MCNC: 0.4 MG/DL
BUN SERPL-MCNC: 34.4 MG/DL (ref 8–23)
CALCIUM SERPL-MCNC: 9.8 MG/DL (ref 8.8–10.2)
CHLORIDE SERPL-SCNC: 103 MMOL/L (ref 98–107)
CREAT SERPL-MCNC: 1.77 MG/DL (ref 0.67–1.17)
DEPRECATED HCO3 PLAS-SCNC: 28 MMOL/L (ref 22–29)
GFR SERPL CREATININE-BSD FRML MDRD: 38 ML/MIN/1.73M2
GLUCOSE SERPL-MCNC: 240 MG/DL (ref 70–99)
HBA1C MFR BLD: 8.6 % (ref 0–5.6)
POTASSIUM SERPL-SCNC: 4.8 MMOL/L (ref 3.4–5.3)
PROT SERPL-MCNC: 6.8 G/DL (ref 6.4–8.3)
SODIUM SERPL-SCNC: 140 MMOL/L (ref 136–145)

## 2022-09-08 PROCEDURE — 83036 HEMOGLOBIN GLYCOSYLATED A1C: CPT | Performed by: INTERNAL MEDICINE

## 2022-09-08 PROCEDURE — 90662 IIV NO PRSV INCREASED AG IM: CPT | Performed by: INTERNAL MEDICINE

## 2022-09-08 PROCEDURE — G0008 ADMIN INFLUENZA VIRUS VAC: HCPCS | Performed by: INTERNAL MEDICINE

## 2022-09-08 PROCEDURE — 99214 OFFICE O/P EST MOD 30 MIN: CPT | Mod: 25 | Performed by: INTERNAL MEDICINE

## 2022-09-08 PROCEDURE — 36415 COLL VENOUS BLD VENIPUNCTURE: CPT | Performed by: INTERNAL MEDICINE

## 2022-09-08 PROCEDURE — 80053 COMPREHEN METABOLIC PANEL: CPT | Performed by: INTERNAL MEDICINE

## 2022-09-08 NOTE — PATIENT INSTRUCTIONS
Continue off your blood pressure medication.    Continue to improve diet.  Walk for least 20 minutes every day.  Daily walking and exercise is an important part of your diabetes treatment plan.    Continue on your current dose of Ozempic and diabetes medications at this time.  You could consider increasing the Ozempic to 1 mg a week in the future.    You will be due for a checkup appointment in December.  See your doctor in [place] then.    See me in the spring when you return to [place].

## 2022-09-08 NOTE — TELEPHONE ENCOUNTER
Central Prior Authorization Team   Phone: [phone]    [place] Initiation    Medication: Uloric 40MG OR TABS  Insurance Company: [employer] Part D - Phone [phone] Fax [phone]  Pharmacy Filling the Rx: [facility] [address] [place] [address]  Filling Pharmacy Phone: [phone]  Filling Pharmacy Fax:    Start Date: 9/8/2022

## 2022-09-08 NOTE — TELEPHONE ENCOUNTER
Proceed with the prior authorization process for Uloric.  Patient has a history of gout.  Patient cannot tolerate allopurinol because of GI upset.

## 2022-09-08 NOTE — PROGRESS NOTES
Yohan Hayward   81 year old male    Date of Visit: 9/8/2022    Chief Complaint   Patient presents with     Follow Up     BP check      Subjective  81-year-old male with diabetes type 2, suspected sleep apnea, and chronic alcohol use with severe peripheral neuropathy.    History of hypertension, patient began having severe orthostatic hypotension spells with recurrent syncope.  He had syncope in June and in August.    2/2022 heart echo was normal, no aortic stenosis.    Patient has been decreasing his blood pressure medication but continued to have hypotension in the morning.  He stopped his carvedilol, which was his last blood pressure medicine, August 16.    His blood pressure has come up.  It is running in the 160-170s over 80s during the day.    In the morning he still gets low blood pressures down to 100-110/60's.    Is no longer having the severe orthostasis weakness spells in the morning.  He has not had further syncope.    He is eating better.  He is trying to walk on a more regular basis.  He stopped drinking regular alcohol the spring.    No chest pain or palpitations.  He has a history of coronary disease with class II angina.  He is on aspirin and Lipitor.    He does have a history of hypertensive encephalopathy in 2014 with some punctate strokes.  Suspected sleep apnea in the past but does not want evaluation.  Since stopping alcohol he has significantly less daytime sleepiness.    He has occasional palpitations but no history of atrial fibrillation.    He did have a Holter monitor done last month that showed normal rhythm except for a few PVCs.  With dizzy spells he had normal rhythm with PVC.    August 2022 carotid ultrasound showed 50-69% on the left and just mild on the right.    His blood sugars have been improving.  He was up in the 200s earlier this summer.  His blood sugars are now in the 150-190 range.  Hemoglobin A1c last June was 7.6%.    He is tolerating 0.5 mg of Ozempic weekly.   On Amaryl 4 mg a day.  He is somewhat resistant to increasing the Ozempic at this time.    No history of pancreatitis.    Does have chronic renal insufficiency with a creatinine of 1.9 last month.    Mild anemia with hemoglobin stable at 12.3 last month, no bleeding issues.    No new cough or fever.    He has a history of gout, last had gout about a year and a half ago.  He did not tolerate allopurinol because of GI upset.  He has been on Uloric for some time but his insurance is denying that refill.  No current gout.    PMHx:  No past medical history on file.  PSHx:    Past Surgical History:   Procedure Laterality Date     ESOPHAGOSCOPY, GASTROSCOPY, DUODENOSCOPY (EGD), COMBINED  05/05/2011     Immunizations:   Immunization History   Administered Date(s) Administered     COVID-19,PF,Pfizer (12+ Yrs) 01/30/2021, 02/23/2021     FLU 6-35 months 10/10/2009     Flu 65+ Years 10/13/2017     Influenza (High Dose) 3 valent vaccine 09/14/2015, 10/27/2016, 09/04/2018, 10/08/2019     Influenza (IIV3) PF 11/10/2011     Influenza, Quad, High Dose, Pf, 65yr+ (Fluzone HD) 10/08/2020     Influenza, Whole Virus 10/16/2013     Pneumo Conj 13-V (2010&after) 07/23/2015     Pneumococcal 23 valent 10/31/2012     Tdap (Adacel,Boostrix) 10/31/2012       ROS A comprehensive review of systems was performed and was otherwise negative    Medications, allergies, and problem list were reviewed and updated    Exam  BP (!) 158/68   Pulse 67   Wt 108.4 kg (239 lb)   SpO2 96%   BMI 33.33 kg/m    Mildly overweight female.  Able to climb up on exam table.  Lungs are clear.  Heart is regular without murmur.  Abdomen is nontender no edema    Assessment/Plan  1. Type 2 diabetes mellitus with diabetic autonomic neuropathy, without long-term current use of insulin (H)  Improving blood sugar control although still mildly hyperglycemic.  Patient did not want to increase the Ozempic to 1 mg, which I did suggest.  He will continue to improve diet and  increase regular walking.  Continue current medications at this time.  He is going to [place] for the winter, he was told to see his [place] doctor in 3 months  - Hemoglobin A1c    Severe peripheral neuropathy and autonomic neuropathy with severe orthostatic hypotension with syncope.    Tolerate higher systolic blood pressures during the day in order to avoid the severe hypotension in the morning.  This has significantly improved the low blood pressures in the morning.  He has not been drinking a glass of water in the morning before getting up.  Has been getting up more slowly and pumping his legs before getting up and that has helped reduce the orthostatic symptoms.    2. Coronary artery disease due to calcified coronary lesion  Asymptomatic.  Continue aspirin and atorvastatin    3. Chronic renal insufficiency, stage 3 (moderate) (H)  Stable.  Follow-up with nephrology next week    4. Encounter for therapeutic drug monitoring    - Comprehensive metabolic panel    5. Need for immunization against influenza  Given today's  - INFLUENZA, QUAD, HD, PF, 65+ (FLUZONE HD)    Patient stated he was not planning to get the new COVID-vaccine.    Suspected sleep apnea but he does not want to get evaluated.  He stopped drinking alcohol now.    History of hypertensive encephalopathy and punctate strokes may have been associated with the sleep apnea in the past, now improved with stopping alcohol.    Carotid artery stenosis, continue medical management as per his coronary disease above.      Return in about 6 months (around 3/8/2023) for Follow up.   Patient Instructions   Continue off your blood pressure medication.    Continue to improve diet.  Walk for least 20 minutes every day.  Daily walking and exercise is an important part of your diabetes treatment plan.    Continue on your current dose of Ozempic and diabetes medications at this time.  You could consider increasing the Ozempic to 1 mg a week in the future.    You will be  due for a checkup appointment in December.  See your doctor in [place] then.    See me in the spring when you return to [place].        Dimitri Ma MD, MD        Current Outpatient Medications   Medication Sig Dispense Refill     aspirin 325 MG tablet Take 325 mg by mouth daily       atorvastatin (LIPITOR) 40 MG tablet [ATORVASTATIN (LIPITOR) 40 MG TABLET] TAKE 1 TABLET BY MOUTH AT BEDTIME 90 tablet 3     glimepiride (AMARYL) 2 MG tablet [GLIMEPIRIDE (AMARYL) 2 MG TABLET] Take 2 tablets (4 mg) by mouth every morning with meal. 180 tablet 3     multivitamin, therapeutic (THERA-VIT) TABS tablet Take 1 tablet by mouth daily       nitroGLYcerin (NITROSTAT) 0.4 MG sublingual tablet [NITROGLYCERIN (NITROSTAT) 0.4 MG SL TABLET] DISSOLVE ONE TABLET UNDER THE TONGUE EVERY 5 MINUTES AS NEEDED FOR CHEST PAIN.  DO NOT EXCEED A TOTAL OF 3 DOSES IN 15 MINUTES 25 tablet 2     semaglutide (OZEMPIC, 0.25 OR 0.5 MG/DOSE,) 2 MG/1.5ML SOPN pen Inject 0.5 mg Subcutaneous once a week 4.5 mL 4     blood glucose (NO BRAND SPECIFIED) test strip Use to test blood sugar 1 time daily or as directed. 100 strip 3     blood glucose calibration (NO BRAND SPECIFIED) solution Use to calibrate blood glucose monitor as needed as directed. 1 each 1     febuxostat (ULORIC) 40 MG TABS tablet Take 1 tablet (40 mg) by mouth daily (Patient not taking: Reported on 2022) 90 tablet 3     Glucose Blood (BLOOD GLUCOSE TEST STRIPS) STRP 1 strip by Subdermal route daily before breakfast Contour Next Strips 100 strip 11     Allergies   Allergen Reactions     Allopurinol Other (See Comments)     Other reaction(s): Chest Tightness, Gave pt pains in chest like a heart attack, Pain     Social History     Tobacco Use     Smoking status: Former Smoker     Packs/day: 1.50     Years: 15.00     Pack years: 22.50     Types: Cigarettes     Quit date: 1981     Years since quittin.7     Smokeless tobacco: Never Used   Substance Use Topics     Alcohol use: Yes      Alcohol/week: 14.0 standard drinks             Grey Almanzar is a 81 year old, presenting for the following health issues:  Follow Up (BP check )      History of Present Illness       Hypertension: He presents for follow up of hypertension.  He does check blood pressure  regularly outside of the clinic. Outpatient blood pressures have not been over 140/90. He does not follow a low salt diet.               Review of Systems         Objective    BP (!) 158/68   Pulse 67   Wt 108.4 kg (239 lb)   SpO2 96%   BMI 33.33 kg/m    Body mass index is 33.33 kg/m .  Physical Exam

## 2022-09-09 ENCOUNTER — DOCUMENTATION ONLY (OUTPATIENT)
Dept: OTHER | Facility: CLINIC | Age: 82
End: 2022-09-09

## 2022-09-09 NOTE — TELEPHONE ENCOUNTER
Prior Authorization Approval    Authorization Effective Date: 6/10/2022  Authorization Expiration Date: 9/8/2023  Medication: Uloric 40MG OR TABS  Approved Dose/Quantity:    Reference #:     Insurance Company: [employer] Part D - Phone [phone] Fax [phone]  Expected CoPay:       CoPay Card Available:      Foundation Assistance Needed:    Which Pharmacy is filling the prescription (Not needed for infusion/clinic administered): [facility] [address] [place] [address]  Pharmacy Notified: Yes  Patient Notified: Yes

## 2022-09-10 DIAGNOSIS — N18.31 STAGE 3A CHRONIC KIDNEY DISEASE (H): Primary | ICD-10-CM

## 2022-09-16 ENCOUNTER — OFFICE VISIT (OUTPATIENT)
Dept: NEPHROLOGY | Facility: CLINIC | Age: 82
End: 2022-09-16
Attending: INTERNAL MEDICINE
Payer: MEDICARE

## 2022-09-16 ENCOUNTER — PRE VISIT (OUTPATIENT)
Dept: NEPHROLOGY | Facility: CLINIC | Age: 82
End: 2022-09-16

## 2022-09-16 ENCOUNTER — LAB (OUTPATIENT)
Dept: LAB | Facility: CLINIC | Age: 82
End: 2022-09-16
Payer: MEDICARE

## 2022-09-16 VITALS
WEIGHT: 241.87 LBS | SYSTOLIC BLOOD PRESSURE: 179 MMHG | HEART RATE: 70 BPM | DIASTOLIC BLOOD PRESSURE: 78 MMHG | OXYGEN SATURATION: 96 % | BODY MASS INDEX: 33.73 KG/M2

## 2022-09-16 DIAGNOSIS — I10 HYPERTENSION, ESSENTIAL: ICD-10-CM

## 2022-09-16 DIAGNOSIS — N18.4 TYPE 2 DIABETES MELLITUS WITH STAGE 4 CHRONIC KIDNEY DISEASE, WITHOUT LONG-TERM CURRENT USE OF INSULIN (H): ICD-10-CM

## 2022-09-16 DIAGNOSIS — R03.0 ELEVATED BLOOD-PRESSURE READING, WITHOUT DIAGNOSIS OF HYPERTENSION: ICD-10-CM

## 2022-09-16 DIAGNOSIS — N18.31 STAGE 3A CHRONIC KIDNEY DISEASE (H): ICD-10-CM

## 2022-09-16 DIAGNOSIS — E11.22 TYPE 2 DIABETES MELLITUS WITH STAGE 4 CHRONIC KIDNEY DISEASE, WITHOUT LONG-TERM CURRENT USE OF INSULIN (H): ICD-10-CM

## 2022-09-16 DIAGNOSIS — I95.1 ORTHOSTATIC HYPOTENSION: ICD-10-CM

## 2022-09-16 DIAGNOSIS — N18.4 CHRONIC RENAL INSUFFICIENCY, STAGE 4 (SEVERE) (H): Primary | ICD-10-CM

## 2022-09-16 LAB
ALBUMIN MFR UR ELPH: 17.2 MG/DL
ALBUMIN SERPL BCG-MCNC: 4.2 G/DL (ref 3.5–5.2)
ALBUMIN UR-MCNC: NEGATIVE MG/DL
ANION GAP SERPL CALCULATED.3IONS-SCNC: 12 MMOL/L (ref 7–15)
APPEARANCE UR: CLEAR
BILIRUB UR QL STRIP: NEGATIVE
BUN SERPL-MCNC: 30.3 MG/DL (ref 8–23)
CALCIUM SERPL-MCNC: 7.3 MG/DL (ref 8.8–10.2)
CHLORIDE SERPL-SCNC: 104 MMOL/L (ref 98–107)
COLOR UR AUTO: YELLOW
CREAT SERPL-MCNC: 1.91 MG/DL (ref 0.67–1.17)
CREAT UR-MCNC: 107 MG/DL
DEPRECATED CALCIDIOL+CALCIFEROL SERPL-MC: 31 UG/L (ref 20–75)
DEPRECATED HCO3 PLAS-SCNC: 25 MMOL/L (ref 22–29)
ERYTHROCYTE [DISTWIDTH] IN BLOOD BY AUTOMATED COUNT: 13.2 % (ref 10–15)
GFR SERPL CREATININE-BSD FRML MDRD: 35 ML/MIN/1.73M2
GLUCOSE SERPL-MCNC: 160 MG/DL (ref 70–99)
GLUCOSE UR STRIP-MCNC: NEGATIVE MG/DL
HCT VFR BLD AUTO: 38.8 % (ref 40–53)
HGB BLD-MCNC: 12.9 G/DL (ref 13.3–17.7)
HGB UR QL STRIP: NEGATIVE
HYALINE CASTS: 11 /LPF
KETONES UR STRIP-MCNC: NEGATIVE MG/DL
LEUKOCYTE ESTERASE UR QL STRIP: NEGATIVE
MCH RBC QN AUTO: 31.1 PG (ref 26.5–33)
MCHC RBC AUTO-ENTMCNC: 33.2 G/DL (ref 31.5–36.5)
MCV RBC AUTO: 94 FL (ref 78–100)
MUCOUS THREADS #/AREA URNS LPF: PRESENT /LPF
NITRATE UR QL: NEGATIVE
PH UR STRIP: 6 [PH] (ref 5–7)
PHOSPHATE SERPL-MCNC: 3.2 MG/DL (ref 2.5–4.5)
PLATELET # BLD AUTO: 226 10E3/UL (ref 150–450)
POTASSIUM SERPL-SCNC: 4.5 MMOL/L (ref 3.4–5.3)
PROT/CREAT 24H UR: 0.16 MG/MG CR (ref 0–0.2)
PTH-INTACT SERPL-MCNC: 51 PG/ML (ref 15–65)
RBC # BLD AUTO: 4.15 10E6/UL (ref 4.4–5.9)
RBC URINE: <1 /HPF
SODIUM SERPL-SCNC: 141 MMOL/L (ref 136–145)
SP GR UR STRIP: 1.01 (ref 1–1.03)
SQUAMOUS EPITHELIAL: <1 /HPF
UROBILINOGEN UR STRIP-MCNC: NORMAL MG/DL
WBC # BLD AUTO: 7.8 10E3/UL (ref 4–11)
WBC URINE: 1 /HPF

## 2022-09-16 PROCEDURE — 83970 ASSAY OF PARATHORMONE: CPT | Performed by: PATHOLOGY

## 2022-09-16 PROCEDURE — 36415 COLL VENOUS BLD VENIPUNCTURE: CPT | Performed by: PATHOLOGY

## 2022-09-16 PROCEDURE — 81001 URINALYSIS AUTO W/SCOPE: CPT | Performed by: PATHOLOGY

## 2022-09-16 PROCEDURE — 84156 ASSAY OF PROTEIN URINE: CPT | Performed by: PATHOLOGY

## 2022-09-16 PROCEDURE — 82565 ASSAY OF CREATININE: CPT | Performed by: PATHOLOGY

## 2022-09-16 PROCEDURE — 85027 COMPLETE CBC AUTOMATED: CPT | Performed by: PATHOLOGY

## 2022-09-16 PROCEDURE — 99204 OFFICE O/P NEW MOD 45 MIN: CPT | Mod: GC | Performed by: INTERNAL MEDICINE

## 2022-09-16 PROCEDURE — 84100 ASSAY OF PHOSPHORUS: CPT | Performed by: PATHOLOGY

## 2022-09-16 PROCEDURE — 80051 ELECTROLYTE PANEL: CPT | Performed by: PATHOLOGY

## 2022-09-16 PROCEDURE — 82040 ASSAY OF SERUM ALBUMIN: CPT | Performed by: PATHOLOGY

## 2022-09-16 PROCEDURE — 82306 VITAMIN D 25 HYDROXY: CPT | Performed by: INTERNAL MEDICINE

## 2022-09-16 PROCEDURE — 82310 ASSAY OF CALCIUM: CPT | Performed by: PATHOLOGY

## 2022-09-16 PROCEDURE — 84520 ASSAY OF UREA NITROGEN: CPT | Performed by: PATHOLOGY

## 2022-09-16 ASSESSMENT — PAIN SCALES - GENERAL: PAINLEVEL: NO PAIN (0)

## 2022-09-16 NOTE — TELEPHONE ENCOUNTER
RECORDS RECEIVED FROM: Internal   DATE RECEIVED: 09.16.2022   NOTES STATUS DETAILS   OFFICE NOTE from referring provider Internal 07.11.2022 Dimitri Ma MD   OFFICE NOTE from other specialist      *Only VASCULITIS or LUPUS gather office notes for the following     *PULMONARY       *ENT     *DERMATOLOGY     *RHEUMATOLOGY     DISCHARGE SUMMARY from hospital     DISCHARGE REPORT from the ER     MEDICATION LIST Internal    IMAGING  (NEED IMAGES AND REPORTS)     KIDNEY CT SCAN     KIDNEY ULTRASOUND     MR ABDOMEN     NUCLEAR MEDICINE RENAL     LABS     CBC Internal 08.10.2022   CMP Internal 08.10.2022   BMP Internal 06.28.2022   UA     URINE PROTEIN     RENAL PANEL     BIOPSY     KIDNEY BIOPSY

## 2022-09-16 NOTE — LETTER
9/16/2022       RE: Yohan Hayward  [address] [facility]  [address]  [place] [address]     Dear Colleague,    Thank you for referring your patient, Yohan Hayward, to the [facility] [place] at [facility]. Please see a copy of my visit note below.    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 Last TTE normal in 02/2022. Per PCP patient has been decreasing his blood pressure medication but continued to have hypotension in the morning.  He stopped his carvedilol, which was his last blood pressure medicine, August 16 2022. However his BP has fluctuated between daytime hypertension and nighttime/early mornin hypotension. Holter monitor done recently showed normal rhythm except for a few PVCs.  With dizzy spells he had normal rhythm with PVC. August 2022 carotid ultrasound showed 50-69% on the left and just mild on the right. Hemoglobin A1c last June was 7.6%. On 0.5 mg of Ozempic weekly.  On Amaryl 4 mg a day.  He has been somewhat resistant to increasing the Ozempic at this time.    Creatinine has remained >2 since 2019 and in the most recent months has decreased to <2. Had a nephrologist in [place] who he saw once per day. Now his PCP wants him to have a established nephrologist.     - Hx of Diabetes: For 20 yearts, elevated HbA1C - 8.6 on 9/8/22. On Ozempic 50 mg, and Glimepiride 2 mg. PCP wants to increase dose but he wants lifestyle.   - History of Hematuria: No   - Swelling: No  - Hx of UTIs:  "No  - Hx of stones: No  - Rashes/Joint pain: No  - Family hx of kidney disease: No  - NSAID use: No   - On ACE/ARB: none due to orthostatic hypotension   - On Lasix: None     Blood Pressure control: morning while laying down is 132/50s and then standing 100/50s. No meds now. Previously on Losartan 100 mg every day, Carvedilol 3.125 mg BID, Nifedipine 30 mg every day, Hydralazine 25 mg PRN    Diet / Fluid intake: \"it could be better\"    Supplements: multivitamin, premium protein drink in the morning.     Renal Ultrasound / Other imaging:    Renal US 7/1/2016  Right kidney: 10.8 cm in length. No hydronephrosis. Right renal vein is patent. The origin of the right renal artery is not visualized. Peak systolic velocity within the right renal artery at the mid aspect is 142 cm/sec.     Left kidney: 10.3 cm in length. No hydronephrosis. Left renal vein is patent. Peak systolic velocity within the left renal artery is 171 cm/sec. Left renal artery origin 91 cm/sec.     IMPRESSION:   Mildly limited study of the right renal artery origin. There is no flow criteria to demonstrate renal artery stenosis on this examination.         Allergies   Allergen Reactions     Allopurinol Other (See Comments)     Other reaction(s): Chest Tightness, Gave pt pains in chest like a heart attack, Pain       aspirin 325 MG tablet, Take 325 mg by mouth daily  atorvastatin (LIPITOR) 40 MG tablet, [ATORVASTATIN (LIPITOR) 40 MG TABLET] TAKE 1 TABLET BY MOUTH AT BEDTIME  blood glucose (NO BRAND SPECIFIED) test strip, Use to test blood sugar 1 time daily or as directed.  blood glucose calibration (NO BRAND SPECIFIED) solution, Use to calibrate blood glucose monitor as needed as directed.  febuxostat (ULORIC) 40 MG TABS tablet, Take 1 tablet (40 mg) by mouth daily (Patient not taking: Reported on 9/8/2022)  glimepiride (AMARYL) 2 MG tablet, [GLIMEPIRIDE (AMARYL) 2 MG TABLET] Take 2 tablets (4 mg) by mouth every morning with meal.  Glucose Blood (BLOOD " GLUCOSE TEST STRIPS) STRP, 1 strip by Subdermal route daily before breakfast Contour Next Strips  multivitamin, therapeutic (THERA-VIT) TABS tablet, Take 1 tablet by mouth daily  nitroGLYcerin (NITROSTAT) 0.4 MG sublingual tablet, [NITROGLYCERIN (NITROSTAT) 0.4 MG SL TABLET] DISSOLVE ONE TABLET UNDER THE TONGUE EVERY 5 MINUTES AS NEEDED FOR CHEST PAIN.  DO NOT EXCEED A TOTAL OF 3 DOSES IN 15 MINUTES  semaglutide (OZEMPIC, 0.25 OR 0.5 MG/DOSE,) 2 MG/1.5ML SOPN pen, Inject 0.5 mg Subcutaneous once a week    No current facility-administered medications on file prior to visit.      No past medical history on file.    Past Surgical History:   Procedure Laterality Date     ESOPHAGOSCOPY, GASTROSCOPY, DUODENOSCOPY (EGD), COMBINED  2011       Social History     Tobacco Use     Smoking status: Former Smoker     Packs/day: 1.50     Years: 15.00     Pack years: 22.50     Types: Cigarettes     Quit date: 1981     Years since quittin.7     Smokeless tobacco: Never Used   Substance Use Topics     Alcohol use: Yes     Alcohol/week: 14.0 standard drinks       Family History   Problem Relation Age of Onset     Colon Cancer Mother         His mother passed away at age 64 due to colon cancer.     Acute Myocardial Infarction Father 42.00     Hypertension Father      Heart Failure Father         His father passed away at age 74 due to congestive heart failure.     Hypertension Sister      Hypertension Brother      Hypertension Brother          ROS: A 12 system review of systems was negative other than noted here or above.       Exam:  There were no vitals taken for this visit.    GENERAL APPEARANCE: alert and no distress  EYES: PERRL, no scleral icterus  HENT: mouth without ulcers or lesions  NECK: supple, no adenopathy  RESP: lungs clear to auscultation   CV: regular rhythm, normal rate, no rub  Extremities: no clubbing, cyanosis, 1+ bilateral TERRY  SKIN: no rash  NEURO: mentation intact and speech normal  PSYCH: affect  normal/bright      Results:    Lab on 09/16/2022   Component Date Value Ref Range Status     WBC Count 09/16/2022 7.8  4.0 - 11.0 10e3/uL Final     RBC Count 09/16/2022 4.15 (A) 4.40 - 5.90 10e6/uL Final     Hemoglobin 09/16/2022 12.9 (A) 13.3 - 17.7 g/dL Final     Hematocrit 09/16/2022 38.8 (A) 40.0 - 53.0 % Final     MCV 09/16/2022 94  78 - 100 fL Final     MCH 09/16/2022 31.1  26.5 - 33.0 pg Final     MCHC 09/16/2022 33.2  31.5 - 36.5 g/dL Final     RDW 09/16/2022 13.2  10.0 - 15.0 % Final     Platelet Count 09/16/2022 226  150 - 450 10e3/uL Final     Total protein urine mg/dL 17.2 (9/16/22).    Assessment/Plan:    1. CKD stage 3b 2/2 HTN, DM2  Mr. Hayward has a history of CKD stage 3b with a stable creatinine of 1.7-2.3 since 2019. He has an extensive history of DM that has been intermittently uncontrolled, and hypertension that has been challenging to control. He has not had a kidney biopsy before. He has seen Nephrology in [place] in the past (once per year), but his PCP wants him to have established care Nephrology. At this time we have discussed the importance of controlling DM and HTN in order to prevent further progression of CKD.     2. DM2, uncontrolled   We discussed and agreed with his PCP to increase his Ozempic, in addition to working strongly on lifestyle. He will need to follow with his PCP for this. Last HgA1 on 9/8/22 was 8.6. In 2021, he had much better control.     3. Hypertension with orthostatic hypotension   Today in clinic BP was 179/78. He monitors his BP at home prior to getting up from bed and usually SBP >130 and then drops to 100 mmHg upon standing. He has had 2 syncope episodes due to this. We discussed the challenge that we are facing now with optimal BP control to protect his kidneys vs avoiding hypotension. At this time we would recommend obtaining a 24 h ABPM and reassessing his BPs. In the meantime we recommend using compression socks up to thighs and possibly and abdominal  binder to improve BP. If his BP is significantly low in the morning, then we could add midodrine 30 min prior to getting up from bed and treating daytime hypertension accordingly. Currently off all BP meds.       Follow up: 2 months       I have discussed patient and plan with Dr. Granda.      Kat Vazquez MD  Nephrology Fellow         Again, thank you for allowing me to participate in the care of your patient.      Sincerely,    Kat Bob MD

## 2022-09-16 NOTE — PROGRESS NOTES
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 Last TTE normal in 02/2022. Per PCP patient has been decreasing his blood pressure medication but continued to have hypotension in the morning.  He stopped his carvedilol, which was his last blood pressure medicine, August 16 2022. However his BP has fluctuated between daytime hypertension and nighttime/early mornin hypotension. Holter monitor done recently showed normal rhythm except for a few PVCs.  With dizzy spells he had normal rhythm with PVC. August 2022 carotid ultrasound showed 50-69% on the left and just mild on the right. Hemoglobin A1c last June was 7.6%. On 0.5 mg of Ozempic weekly.  On Amaryl 4 mg a day.  He has been somewhat resistant to increasing the Ozempic at this time.    Creatinine has remained >2 since 2019 and in the most recent months has decreased to <2. Had a nephrologist in [place] who he saw once per day. Now his PCP wants him to have a established nephrologist.     - Hx of Diabetes: For 20 yearts, elevated HbA1C - 8.6 on 9/8/22. On Ozempic 50 mg, and Glimepiride 2 mg. PCP wants to increase dose but he wants lifestyle.   - History of Hematuria: No   - Swelling: No  - Hx of UTIs: No  - Hx of stones: No  - Rashes/Joint pain: No  - Family hx of kidney disease: No  - NSAID use: No   - On ACE/ARB: none due to orthostatic hypotension   - On Lasix: None     Blood Pressure control: morning while laying down is 132/50s and then standing 100/50s. No meds now. Previously on Losartan 100 mg every day, Carvedilol 3.125  "mg BID, Nifedipine 30 mg every day, Hydralazine 25 mg PRN    Diet / Fluid intake: \"it could be better\"    Supplements: multivitamin, premium protein drink in the morning.     Renal Ultrasound / Other imaging:    Renal US 7/1/2016  Right kidney: 10.8 cm in length. No hydronephrosis. Right renal vein is patent. The origin of the right renal artery is not visualized. Peak systolic velocity within the right renal artery at the mid aspect is 142 cm/sec.     Left kidney: 10.3 cm in length. No hydronephrosis. Left renal vein is patent. Peak systolic velocity within the left renal artery is 171 cm/sec. Left renal artery origin 91 cm/sec.     IMPRESSION:   Mildly limited study of the right renal artery origin. There is no flow criteria to demonstrate renal artery stenosis on this examination.         Allergies   Allergen Reactions     Allopurinol Other (See Comments)     Other reaction(s): Chest Tightness, Gave pt pains in chest like a heart attack, Pain       aspirin 325 MG tablet, Take 325 mg by mouth daily  atorvastatin (LIPITOR) 40 MG tablet, [ATORVASTATIN (LIPITOR) 40 MG TABLET] TAKE 1 TABLET BY MOUTH AT BEDTIME  blood glucose (NO BRAND SPECIFIED) test strip, Use to test blood sugar 1 time daily or as directed.  blood glucose calibration (NO BRAND SPECIFIED) solution, Use to calibrate blood glucose monitor as needed as directed.  febuxostat (ULORIC) 40 MG TABS tablet, Take 1 tablet (40 mg) by mouth daily (Patient not taking: Reported on 9/8/2022)  glimepiride (AMARYL) 2 MG tablet, [GLIMEPIRIDE (AMARYL) 2 MG TABLET] Take 2 tablets (4 mg) by mouth every morning with meal.  Glucose Blood (BLOOD GLUCOSE TEST STRIPS) STRP, 1 strip by Subdermal route daily before breakfast Contour Next Strips  multivitamin, therapeutic (THERA-VIT) TABS tablet, Take 1 tablet by mouth daily  nitroGLYcerin (NITROSTAT) 0.4 MG sublingual tablet, [NITROGLYCERIN (NITROSTAT) 0.4 MG SL TABLET] DISSOLVE ONE TABLET UNDER THE TONGUE EVERY 5 MINUTES AS " NEEDED FOR CHEST PAIN.  DO NOT EXCEED A TOTAL OF 3 DOSES IN 15 MINUTES  semaglutide (OZEMPIC, 0.25 OR 0.5 MG/DOSE,) 2 MG/1.5ML SOPN pen, Inject 0.5 mg Subcutaneous once a week    No current facility-administered medications on file prior to visit.      No past medical history on file.    Past Surgical History:   Procedure Laterality Date     ESOPHAGOSCOPY, GASTROSCOPY, DUODENOSCOPY (EGD), COMBINED  2011       Social History     Tobacco Use     Smoking status: Former Smoker     Packs/day: 1.50     Years: 15.00     Pack years: 22.50     Types: Cigarettes     Quit date: 1981     Years since quittin.7     Smokeless tobacco: Never Used   Substance Use Topics     Alcohol use: Yes     Alcohol/week: 14.0 standard drinks       Family History   Problem Relation Age of Onset     Colon Cancer Mother         His mother passed away at age 64 due to colon cancer.     Acute Myocardial Infarction Father 42.00     Hypertension Father      Heart Failure Father         His father passed away at age 74 due to congestive heart failure.     Hypertension Sister      Hypertension Brother      Hypertension Brother          ROS: A 12 system review of systems was negative other than noted here or above.       Exam:  There were no vitals taken for this visit.    GENERAL APPEARANCE: alert and no distress  EYES: PERRL, no scleral icterus  HENT: mouth without ulcers or lesions  NECK: supple, no adenopathy  RESP: lungs clear to auscultation   CV: regular rhythm, normal rate, no rub  Extremities: no clubbing, cyanosis, 1+ bilateral TERRY  SKIN: no rash  NEURO: mentation intact and speech normal  PSYCH: affect normal/bright      Results:    Lab on 2022   Component Date Value Ref Range Status     WBC Count 2022 7.8  4.0 - 11.0 10e3/uL Final     RBC Count 2022 4.15 (A) 4.40 - 5.90 10e6/uL Final     Hemoglobin 2022 12.9 (A) 13.3 - 17.7 g/dL Final     Hematocrit 2022 38.8 (A) 40.0 - 53.0 % Final     MCV  09/16/2022 94  78 - 100 fL Final     MCH 09/16/2022 31.1  26.5 - 33.0 pg Final     MCHC 09/16/2022 33.2  31.5 - 36.5 g/dL Final     RDW 09/16/2022 13.2  10.0 - 15.0 % Final     Platelet Count 09/16/2022 226  150 - 450 10e3/uL Final     Total protein urine mg/dL 17.2 (9/16/22).    Assessment/Plan:    1. CKD stage 3b 2/2 HTN, DM2  Mr. Hayward has a history of CKD stage 3b with a stable creatinine of 1.7-2.3 since 2019. He has an extensive history of DM that has been intermittently uncontrolled, and hypertension that has been challenging to control. He has not had a kidney biopsy before. He has seen Nephrology in [place] in the past (once per year), but his PCP wants him to have established care Nephrology. At this time we have discussed the importance of controlling DM and HTN in order to prevent further progression of CKD.     2. DM2, uncontrolled   We discussed and agreed with his PCP to increase his Ozempic, in addition to working strongly on lifestyle. He will need to follow with his PCP for this. Last HgA1 on 9/8/22 was 8.6. In 2021, he had much better control.     3. Hypertension with orthostatic hypotension   Today in clinic BP was 179/78. He monitors his BP at home prior to getting up from bed and usually SBP >130 and then drops to 100 mmHg upon standing. He has had 2 syncope episodes due to this. We discussed the challenge that we are facing now with optimal BP control to protect his kidneys vs avoiding hypotension. At this time we would recommend obtaining a 24 h ABPM and reassessing his BPs. In the meantime we recommend using compression socks up to thighs and possibly and abdominal binder to improve BP. If his BP is significantly low in the morning, then we could add midodrine 30 min prior to getting up from bed and treating daytime hypertension accordingly. Currently off all BP meds.       Follow up: 2 months       I have discussed patient and plan with Dr. Granda.      Kat Vazquez MD  Nephrology  Fellow

## 2022-09-21 ENCOUNTER — HOSPITAL ENCOUNTER (OUTPATIENT)
Dept: ULTRASOUND IMAGING | Facility: CLINIC | Age: 82
Discharge: HOME | End: 2022-09-21
Attending: INTERNAL MEDICINE
Payer: MEDICARE

## 2022-09-21 ENCOUNTER — HOSPITAL ENCOUNTER (OUTPATIENT)
Dept: CARDIOLOGY | Facility: CLINIC | Age: 82
Discharge: HOME | End: 2022-09-21
Attending: INTERNAL MEDICINE
Payer: MEDICARE

## 2022-09-21 DIAGNOSIS — N18.4 TYPE 2 DIABETES MELLITUS WITH STAGE 4 CHRONIC KIDNEY DISEASE, WITHOUT LONG-TERM CURRENT USE OF INSULIN (H): ICD-10-CM

## 2022-09-21 DIAGNOSIS — R03.0 ELEVATED BLOOD-PRESSURE READING, WITHOUT DIAGNOSIS OF HYPERTENSION: ICD-10-CM

## 2022-09-21 DIAGNOSIS — E11.22 TYPE 2 DIABETES MELLITUS WITH STAGE 4 CHRONIC KIDNEY DISEASE, WITHOUT LONG-TERM CURRENT USE OF INSULIN (H): ICD-10-CM

## 2022-09-21 DIAGNOSIS — I95.1 ORTHOSTATIC HYPOTENSION: ICD-10-CM

## 2022-09-21 DIAGNOSIS — N18.4 CHRONIC RENAL INSUFFICIENCY, STAGE 4 (SEVERE) (H): ICD-10-CM

## 2022-09-21 DIAGNOSIS — I10 HYPERTENSION, ESSENTIAL: ICD-10-CM

## 2022-09-21 PROCEDURE — 93790 AMBL BP MNTR W/SW I&R: CPT | Performed by: INTERNAL MEDICINE

## 2022-09-21 PROCEDURE — 93786 AMBL BP MNTR W/SW REC ONLY: CPT

## 2022-09-21 PROCEDURE — 76770 US EXAM ABDO BACK WALL COMP: CPT

## 2022-09-21 PROCEDURE — 76770 US EXAM ABDO BACK WALL COMP: CPT | Mod: 26 | Performed by: RADIOLOGY

## 2022-09-23 ENCOUNTER — MEDICAL CORRESPONDENCE (OUTPATIENT)
Dept: CARDIOLOGY | Facility: CLINIC | Age: 82
End: 2022-09-23

## 2022-11-08 ENCOUNTER — APPOINTMENT (RX ONLY)
Dept: URBAN - METROPOLITAN AREA CLINIC 158 | Facility: CLINIC | Age: 82
Setting detail: DERMATOLOGY
End: 2022-11-08

## 2022-11-08 DIAGNOSIS — L82.1 OTHER SEBORRHEIC KERATOSIS: ICD-10-CM

## 2022-11-08 DIAGNOSIS — L81.4 OTHER MELANIN HYPERPIGMENTATION: ICD-10-CM

## 2022-11-08 DIAGNOSIS — L70.8 OTHER ACNE: ICD-10-CM

## 2022-11-08 DIAGNOSIS — D22 MELANOCYTIC NEVI: ICD-10-CM

## 2022-11-08 DIAGNOSIS — L21.8 OTHER SEBORRHEIC DERMATITIS: ICD-10-CM

## 2022-11-08 PROBLEM — D22.5 MELANOCYTIC NEVI OF TRUNK: Status: ACTIVE | Noted: 2022-11-08

## 2022-11-08 PROBLEM — D48.5 NEOPLASM OF UNCERTAIN BEHAVIOR OF SKIN: Status: ACTIVE | Noted: 2022-11-08

## 2022-11-08 PROBLEM — D22.4 MELANOCYTIC NEVI OF SCALP AND NECK: Status: ACTIVE | Noted: 2022-11-08

## 2022-11-08 PROBLEM — D23.71 OTHER BENIGN NEOPLASM OF SKIN OF RIGHT LOWER LIMB, INCLUDING HIP: Status: ACTIVE | Noted: 2022-11-08

## 2022-11-08 PROCEDURE — ? DEFER

## 2022-11-08 PROCEDURE — 11102 TANGNTL BX SKIN SINGLE LES: CPT

## 2022-11-08 PROCEDURE — ? TREATMENT REGIMEN

## 2022-11-08 PROCEDURE — 99203 OFFICE O/P NEW LOW 30 MIN: CPT | Mod: 25

## 2022-11-08 PROCEDURE — ? BIOPSY BY SHAVE METHOD

## 2022-11-08 PROCEDURE — ? COUNSELING

## 2022-11-08 PROCEDURE — ? PRESCRIPTION

## 2022-11-08 RX ORDER — SELENIUM SULFIDE 2.5 MG/100ML
LOTION TOPICAL ONCE A DAY
Qty: 360 | Refills: 5 | Status: ERX | COMMUNITY
Start: 2022-11-08

## 2022-11-08 RX ADMIN — SELENIUM SULFIDE: 2.5 LOTION TOPICAL at 00:00

## 2022-11-08 ASSESSMENT — LOCATION ZONE DERM
LOCATION ZONE: TRUNK
LOCATION ZONE: LEG
LOCATION ZONE: SCALP
LOCATION ZONE: NECK
LOCATION ZONE: FACE
LOCATION ZONE: NOSE

## 2022-11-08 ASSESSMENT — LOCATION SIMPLE DESCRIPTION DERM
LOCATION SIMPLE: LEFT FOREHEAD
LOCATION SIMPLE: POSTERIOR SCALP
LOCATION SIMPLE: RIGHT ANTERIOR NECK
LOCATION SIMPLE: SCALP
LOCATION SIMPLE: LEFT THIGH
LOCATION SIMPLE: LEFT NOSE
LOCATION SIMPLE: RIGHT UPPER BACK
LOCATION SIMPLE: RIGHT FOREHEAD
LOCATION SIMPLE: LEFT LOWER BACK
LOCATION SIMPLE: POSTERIOR NECK
LOCATION SIMPLE: RIGHT THIGH
LOCATION SIMPLE: LEFT UPPER BACK
LOCATION SIMPLE: ANTERIOR SCALP

## 2022-11-08 ASSESSMENT — LOCATION DETAILED DESCRIPTION DERM
LOCATION DETAILED: RIGHT ANTERIOR PROXIMAL THIGH
LOCATION DETAILED: MID-FRONTAL SCALP
LOCATION DETAILED: MID-OCCIPITAL SCALP
LOCATION DETAILED: LEFT MID-UPPER BACK
LOCATION DETAILED: LEFT CENTRAL POSTAURICULAR SKIN
LOCATION DETAILED: LEFT INFERIOR FOREHEAD
LOCATION DETAILED: RIGHT CLAVICULAR NECK
LOCATION DETAILED: RIGHT SUPERIOR FOREHEAD
LOCATION DETAILED: RIGHT SUPERIOR UPPER BACK
LOCATION DETAILED: RIGHT MID-UPPER BACK
LOCATION DETAILED: MID POSTERIOR NECK
LOCATION DETAILED: LEFT SUPERIOR UPPER BACK
LOCATION DETAILED: LEFT NASAL ALA
LOCATION DETAILED: LEFT SUPERIOR LATERAL LOWER BACK
LOCATION DETAILED: LEFT ANTERIOR PROXIMAL THIGH

## 2022-11-08 NOTE — PROCEDURE: TREATMENT REGIMEN
Plan: selenium sulfide 2.5% lotion as scalp shampoo daily.\\nF/U in one month; recheck vertex of scalp for actinic damage after seb derm is controlled
Detail Level: Detailed

## 2022-11-08 NOTE — PROCEDURE: BIOPSY BY SHAVE METHOD
Detail Level: Detailed
Depth Of Biopsy: dermis
Was A Bandage Applied: Yes
Size Of Lesion In Cm: 1.2
X Size Of Lesion In Cm: 0
Biopsy Type: H and E
Biopsy Method: Dermablade
Anesthesia Type: 2% lidocaine with epinephrine
Anesthesia Volume In Cc (Will Not Render If 0): 0.5
Hemostasis: Drysol
Wound Care: Petrolatum
Dressing: bandage
Destruction After The Procedure: No
Type Of Destruction Used: Curettage
Curettage Text: The wound bed was treated with curettage after the biopsy was performed.
Cryotherapy Text: The wound bed was treated with cryotherapy after the biopsy was performed.
Electrodesiccation Text: The wound bed was treated with electrodesiccation after the biopsy was performed.
Electrodesiccation And Curettage Text: The wound bed was treated with electrodesiccation and curettage after the biopsy was performed.
Silver Nitrate Text: The wound bed was treated with silver nitrate after the biopsy was performed.
Lab: [id number]
Lab Facility: [id number]
Consent: Verbal consent was obtained and risks were reviewed including but not limited to scarring, infection, bleeding, scabbing, incomplete removal, nerve damage and allergy to anesthesia.
Post-Care Instructions: I reviewed with the patient in detail post-care instructions. Patient is to keep the biopsy site dry overnight, and then apply bacitracin twice daily until healed. Patient may apply hydrogen peroxide soaks to remove any crusting.
Notification Instructions: Patient will be notified of biopsy results. However, patient instructed to call the office if not contacted within 2 weeks.
Billing Type: Third-Party Bill
Information: Selecting Yes will display possible errors in your note based on the variables you have selected. This validation is only offered as a suggestion for you. PLEASE NOTE THAT THE VALIDATION TEXT WILL BE REMOVED WHEN YOU FINALIZE YOUR NOTE. IF YOU WANT TO FAX A PRELIMINARY NOTE YOU WILL NEED TO TOGGLE THIS TO 'NO' IF YOU DO NOT WANT IT IN YOUR FAXED NOTE.

## 2022-11-08 NOTE — PROCEDURE: DEFER
Detail Level: Detailed
X Size Of Lesion In Cm (Optional): 0
Introduction Text (Please End With A Colon): :
Instructions (Optional): Recheck next month

## 2022-11-28 ENCOUNTER — TELEPHONE (OUTPATIENT)
Dept: INTERNAL MEDICINE | Facility: CLINIC | Age: 82
End: 2022-11-28

## 2022-11-28 DIAGNOSIS — E11.69 TYPE 2 DIABETES MELLITUS WITH OTHER SPECIFIED COMPLICATION, WITHOUT LONG-TERM CURRENT USE OF INSULIN (H): Primary | ICD-10-CM

## 2022-11-28 RX ORDER — DULAGLUTIDE 0.75 MG/.5ML
0.75 INJECTION, SOLUTION SUBCUTANEOUS
Qty: 2 ML | Refills: 3 | Status: SHIPPED | OUTPATIENT
Start: 2022-11-28 | End: 2023-05-05

## 2022-11-28 NOTE — TELEPHONE ENCOUNTER
Tell patient there is a nationwide shortage of Ozempic.  I did send in a prescription for Trulicity 0.75 mg weekly, which is roughly equivalent to the Ozempic.  But there may be a shortage of Trulicity as well.  If he cannot get Ozempic or Trulicity, he will simply have to wait until those medications become available.

## 2022-11-28 NOTE — TELEPHONE ENCOUNTER
Called patient to let him know - he verbalized good understanding. No further questions at this time.     Kiara Anne RN, BSN  [facility]

## 2022-11-28 NOTE — TELEPHONE ENCOUNTER
Patient calling regarding Ozempic prescription.   He is in [place] for the winter, there is not a pharmacy in [place] that has ozempic in stock at this time.     Pt wondering if it is OK to wait until they get more of this (not sure when), or if an alternative medication should be used?     To provider to review/advise.     Pharmacy in [place]:  [facility] [address] [place] [address] [phone] [place]    Kiara Anne RN, BSN  [facility]

## 2022-12-05 ENCOUNTER — APPOINTMENT (RX ONLY)
Dept: URBAN - METROPOLITAN AREA CLINIC 158 | Facility: CLINIC | Age: 82
Setting detail: DERMATOLOGY
End: 2022-12-05

## 2022-12-05 DIAGNOSIS — L21.8 OTHER SEBORRHEIC DERMATITIS: ICD-10-CM | Status: IMPROVED

## 2022-12-05 DIAGNOSIS — L57.0 ACTINIC KERATOSIS: ICD-10-CM

## 2022-12-05 PROBLEM — D48.5 NEOPLASM OF UNCERTAIN BEHAVIOR OF SKIN: Status: ACTIVE | Noted: 2022-12-05

## 2022-12-05 PROCEDURE — ? COUNSELING

## 2022-12-05 PROCEDURE — 11102 TANGNTL BX SKIN SINGLE LES: CPT | Mod: 59

## 2022-12-05 PROCEDURE — 99213 OFFICE O/P EST LOW 20 MIN: CPT | Mod: 25

## 2022-12-05 PROCEDURE — 17004 DESTROY PREMAL LESIONS 15/>: CPT

## 2022-12-05 PROCEDURE — ? LIQUID NITROGEN

## 2022-12-05 PROCEDURE — ? TREATMENT REGIMEN

## 2022-12-05 PROCEDURE — ? BIOPSY BY SHAVE METHOD

## 2022-12-05 ASSESSMENT — LOCATION DETAILED DESCRIPTION DERM
LOCATION DETAILED: LEFT ANTIHELIX
LOCATION DETAILED: RIGHT POSTERIOR EAR
LOCATION DETAILED: LEFT INFERIOR FRONTAL SCALP
LOCATION DETAILED: LEFT SUPERIOR PARIETAL SCALP
LOCATION DETAILED: RIGHT INFERIOR HELIX
LOCATION DETAILED: RIGHT ULNAR DORSAL HAND
LOCATION DETAILED: LEFT SUPERIOR LATERAL FOREHEAD
LOCATION DETAILED: LEFT SUPERIOR OCCIPITAL SCALP
LOCATION DETAILED: RIGHT LATERAL BUCCAL CHEEK
LOCATION DETAILED: NASAL DORSUM
LOCATION DETAILED: RIGHT ANTIHELIX
LOCATION DETAILED: RIGHT CENTRAL ZYGOMA
LOCATION DETAILED: RIGHT CENTRAL MALAR CHEEK
LOCATION DETAILED: NASAL ROOT
LOCATION DETAILED: RIGHT SUPERIOR HELIX
LOCATION DETAILED: RIGHT MEDIAL ZYGOMA
LOCATION DETAILED: MID-FRONTAL SCALP
LOCATION DETAILED: MID-OCCIPITAL SCALP
LOCATION DETAILED: RIGHT CENTRAL FRONTAL SCALP

## 2022-12-05 ASSESSMENT — LOCATION SIMPLE DESCRIPTION DERM
LOCATION SIMPLE: LEFT FOREHEAD
LOCATION SIMPLE: RIGHT CHEEK
LOCATION SIMPLE: ANTERIOR SCALP
LOCATION SIMPLE: LEFT OCCIPITAL SCALP
LOCATION SIMPLE: RIGHT ZYGOMA
LOCATION SIMPLE: RIGHT HAND
LOCATION SIMPLE: RIGHT EAR
LOCATION SIMPLE: NOSE
LOCATION SIMPLE: POSTERIOR SCALP
LOCATION SIMPLE: LEFT EAR
LOCATION SIMPLE: SCALP

## 2022-12-05 ASSESSMENT — LOCATION ZONE DERM
LOCATION ZONE: NOSE
LOCATION ZONE: FACE
LOCATION ZONE: HAND
LOCATION ZONE: EAR
LOCATION ZONE: SCALP

## 2022-12-05 NOTE — PROCEDURE: LIQUID NITROGEN
done
Detail Level: Zone
Show Applicator Variable?: Yes
Duration Of Freeze Thaw-Cycle (Seconds): 0
Post-Care Instructions: I reviewed with the patient in detail post-care instructions. Patient is to wear sunprotection. Pt may apply Vaseline to crusted or scabbing areas.
Render Note In Bullet Format When Appropriate: No
Application Tool (Optional): Cotton Tipped Applicator
Number Of Freeze-Thaw Cycles: 1 freeze-thaw cycle
Consent: The patient's consent was obtained including but not limited to risks of crusting, scabbing, blistering, scarring, darker or lighter pigmentary change, recurrence, incomplete removal and infection.
Application Tool (Optional): Cotton Tipped Applicator
Detail Level: Detailed

## 2022-12-08 ENCOUNTER — APPOINTMENT (RX ONLY)
Dept: URBAN - METROPOLITAN AREA CLINIC 165 | Facility: CLINIC | Age: 82
Setting detail: DERMATOLOGY
End: 2022-12-08

## 2022-12-08 PROBLEM — C44.311 BASAL CELL CARCINOMA OF SKIN OF NOSE: Status: ACTIVE | Noted: 2022-12-08

## 2022-12-08 PROCEDURE — ? PRESCRIPTION

## 2022-12-08 PROCEDURE — ? PATHOLOGY DISCUSSION

## 2022-12-08 PROCEDURE — ? PHOTO-DOCUMENTATION

## 2022-12-08 PROCEDURE — ? CONSULTATION FOR MOHS SURGERY

## 2022-12-08 PROCEDURE — ? COUNSELING

## 2022-12-08 PROCEDURE — 99214 OFFICE O/P EST MOD 30 MIN: CPT | Mod: 24

## 2022-12-08 RX ORDER — ALPRAZOLAM 0.5 MG/1
TABLET ORAL
Qty: 2 | Refills: 0 | Status: ERX | COMMUNITY
Start: 2022-12-08

## 2022-12-08 RX ADMIN — ALPRAZOLAM: 0.5 TABLET ORAL at 00:00

## 2022-12-08 NOTE — PROCEDURE: CONSULTATION FOR MOHS SURGERY
Detail Level: Detailed
X Size Of Lesion In Cm (Optional): 0
Anatomic Location From Referring Provider: Left nasal root
Name Of The Referring Provider For Procedure: Edith Campuzano MD
Date Scheduled For Mohs (Optional): Needs to be scheduled for surgery
Incorporate Mauc In Note: Yes

## 2022-12-08 NOTE — HPI: MOHS SURGERY CONSULTATION
Has The Cancer Been Biopsied Before?: has been previously biopsied
Who Is Your Referring Provider?: Edith Campuzano MD
When Was Your Biopsy?: 11/08/2022
Year Removed: 1900
Accession (Optional): [id number]

## 2022-12-08 NOTE — PROCEDURE: PHOTO-DOCUMENTATION
Detail Level: Zone
Photo Preface (Leave Blank If You Do Not Want): Photographs were obtained today: Consult photo-marked

## 2022-12-21 ENCOUNTER — APPOINTMENT (RX ONLY)
Dept: URBAN - METROPOLITAN AREA CLINIC 165 | Facility: CLINIC | Age: 82
Setting detail: DERMATOLOGY
End: 2022-12-21

## 2022-12-21 PROBLEM — C44.311 BASAL CELL CARCINOMA OF SKIN OF NOSE: Status: ACTIVE | Noted: 2022-12-21

## 2022-12-21 PROCEDURE — ? MOHS SURGERY

## 2022-12-21 PROCEDURE — 17311 MOHS 1 STAGE H/N/HF/G: CPT

## 2022-12-21 PROCEDURE — 15260 FTH/GFT FR N/E/E/L 20 SQCM/<: CPT

## 2022-12-21 NOTE — PROCEDURE: MIPS QUALITY
Quality 111:Pneumonia Vaccination Status For Older Adults: Pneumococcal vaccine (PPSV23) administered on or after patient’s 60th birthday and before the end of the measurement period
Quality 130: Documentation Of Current Medications In The Medical Record: Current Medications Documented
Detail Level: Detailed
Quality 358: Patient-Centered Surgical Risk Assessment And Communication: Documentation of patient-specific risk assessment with a risk calculator based on multi-institutional clinical data, the specific risk calculator used, and communication of risk assessment from risk calculator with the patient or family.
Quality 265: Biopsy Follow-Up: Biopsy results reviewed, communicated, tracked, and documented
Quality 110: Preventive Care And Screening: Influenza Immunization: Influenza Immunization Administered during Influenza season
Quality 226: Preventive Care And Screening: Tobacco Use: Screening And Cessation Intervention: Patient screened for tobacco use and is an ex/non-smoker

## 2022-12-21 NOTE — PROCEDURE: MOHS SURGERY
Mohs Case Number: [id number]
Date Of Previous Biopsy (Optional): 11/08/2022
Previous Accession (Optional): [id number]
Biopsy Photograph Reviewed: Yes
Referring Physician (Optional): Edith Campuzano MD
Consent Type: Consent (Lesion Near or On Eyelid)
Eye Shield Used: No
Surgeon Performing Repair (Optional): Alex
Initial Size Of Lesion: 1.4
X Size Of Lesion In Cm (Optional): 1.5
Number Of Stages: 1
Primary Defect Length In Cm (Final Defect Size - Required For Flaps/Grafts): 2.3
Primary Defect Width In Cm (Final Defect Size - Required For Flaps/Grafts): 2
Repair Type: Graft
Oculoplastic Surgeon Procedure Text (A): After obtaining clear surgical margins the patient was sent to oculoplastics for surgical repair.  The patient understands they will receive post-surgical care and follow-up from the referring physician's office.
Oculoplastic Surgeon Procedure Text (B): After obtaining clear surgical margins the patient was sent to oculoplastics for surgical repair.  The patient understands they will receive post-surgical care and follow-up from the referring physician's office.
Otolaryngologist Procedure Text (A): After obtaining clear surgical margins the patient was sent to otolaryngology for surgical repair.  The patient understands they will receive post-surgical care and follow-up from the referring physician's office.
Otolaryngologist Procedure Text (B): After obtaining clear surgical margins the patient was sent to otolaryngology for surgical repair.  The patient understands they will receive post-surgical care and follow-up from the referring physician's office.
Plastic Surgeon Procedure Text (A): After obtaining clear surgical margins the patient was sent to plastics for surgical repair.  The patient understands they will receive post-surgical care and follow-up from the referring physician's office.
Plastic Surgeon Procedure Text (B): After obtaining clear surgical margins the patient was sent to plastics for surgical repair.  The patient understands they will receive post-surgical care and follow-up from the referring physician's office.
Mid-Level Procedure Text (A): After obtaining clear surgical margins the patient was sent to a mid-level provider for surgical repair.  The patient understands they will receive post-surgical care and follow-up from the mid-level provider.
Mid-Level Procedure Text (B): After obtaining clear surgical margins the patient was sent to a mid-level provider for surgical repair.  The patient understands they will receive post-surgical care and follow-up from the mid-level provider.
Provider Procedure Text (A): After obtaining clear surgical margins the defect was repaired by another provider.
Asc Procedure Text (A): After obtaining clear surgical margins the patient was sent to an ASC for surgical repair.  The patient understands they will receive post-surgical care and follow-up from the ASC physician.
Asc Procedure Text (B): After obtaining clear surgical margins the patient was sent to an ASC for surgical repair.  The patient understands they will receive post-surgical care and follow-up from the ASC physician.
Simple / Intermediate / Complex Repair - Final Wound Length In Cm: 0
Suturegard Retention Suture: 2-0 Nylon
Retention Suture Bite Size: 3 mm
Length To Time In Minutes Device Was In Place: 10
Undermining Type: Entire Wound
Debridement Text: The wound edges were debrided prior to proceeding with the closure to facilitate wound healing.
Helical Rim Text: The closure involved the helical rim.
Vermilion Border Text: The closure involved the vermilion border.
Nostril Rim Text: The closure involved the nostril rim.
Retention Suture Text: Retention sutures were placed to support the closure and prevent dehiscence.
Graft Type: Full Thickness Skin Graft
Graft Donor Site: Left Postauricular skin
Location Indication Override (Is Already Calculated Based On Selected Body Location): Area H
Area H Indication Text: Tumors in this location are included in Area H (eyelids, eyebrows, nose, lips, chin, ear, pre-auricular, post-auricular, temple, genitalia, hands, feet, ankles and areola).  Tissue conservation is critical in these anatomic locations.
Area M Indication Text: Tumors in this location are included in Area M (cheek, forehead, scalp, neck, jawline and pretibial skin).  Mohs surgery is indicated for tumors in these anatomic locations.
Area L Indication Text: Tumors in this location are included in Area L (trunk and extremities).  Mohs surgery is indicated for larger tumors, or tumors with aggressive histologic features, in these anatomic locations.
Depth Of Tumor Invasion (For Histology): dermis
Perineural Invasion (For Histology - Be Specific If Possible): absent
Special Stains Stage 1 - Results: Base On Clearance Noted Above
Histology Selection Override (Optional- Will Default To Parent Diagnosis If N/A): Nodular Basal Cell Carcinoma
Stage 2: Additional Anesthesia Type: 1% lidocaine with 1:100,000 epinephrine and 408mcg clindamycin/ml and a 1:10 solution of 8.4% sodium bicarbonate
Staging Info: By selecting yes to the question above you will include information on AJCC 8 tumor staging in your Mohs note. Information on tumor staging will be automatically added for SCCs on the head and neck. AJCC 8 includes tumor size, tumor depth, perineural involvement and bone invasion.
Tumor Depth: Less than 6mm from granular layer and no invasion beyond the subcutaneous fat
Was The Patient On Physician Recommended Anticoagulation Therapy?: Please Select the Appropriate Response
Medical Necessity Statement: Based on my medical judgement, Mohs surgery is the most appropriate treatment for this cancer compared to other treatments.
Alternatives Discussed Intro (Do Not Add Period): I discussed alternative treatments to Mohs surgery and specifically discussed the risks and benefits of
Consent 1/Introductory Paragraph: The rationale for Mohs was explained to the patient and consent was obtained. The risks, benefits and alternatives to therapy were discussed in detail. Specifically, the risks of infection, scarring, bleeding, prolonged wound healing, incomplete removal, allergy to anesthesia, nerve injury and recurrence were addressed. Prior to the procedure, the treatment site was clearly identified and confirmed by the patient. All components of Universal Protocol/PAUSE Rule completed.
Consent 2/Introductory Paragraph: Mohs surgery was explained to the patient and consent was obtained. The risks, benefits and alternatives to therapy were discussed in detail. Specifically, the risks of infection, scarring, bleeding, prolonged wound healing, incomplete removal, allergy to anesthesia, nerve injury and recurrence were addressed. Prior to the procedure, the treatment site was clearly identified and confirmed by the patient. All components of Universal Protocol/PAUSE Rule completed.
Consent 3/Introductory Paragraph: I gave the patient a chance to ask questions they had about the procedure.  Following this I explained the Mohs procedure and consent was obtained. The risks, benefits and alternatives to therapy were discussed in detail. Specifically, the risks of infection, scarring, bleeding, prolonged wound healing, incomplete removal, allergy to anesthesia, nerve injury and recurrence were addressed. Prior to the procedure, the treatment site was clearly identified and confirmed by the patient. All components of Universal Protocol/PAUSE Rule completed.
Consent (Temporal Branch)/Introductory Paragraph: The rationale for Mohs was explained to the patient and consent was obtained. The risks, benefits and alternatives to therapy were discussed in detail. Specifically, the risks of damage to the temporal branch of the facial nerve, infection, scarring, bleeding, prolonged wound healing, incomplete removal, allergy to anesthesia, and recurrence were addressed. Prior to the procedure, the treatment site was clearly identified and confirmed by the patient. All components of Universal Protocol/PAUSE Rule completed.
Consent (Marginal Mandibular)/Introductory Paragraph: The rationale for Mohs was explained to the patient and consent was obtained. The risks, benefits and alternatives to therapy were discussed in detail. Specifically, the risks of damage to the marginal mandibular branch of the facial nerve, infection, scarring, bleeding, prolonged wound healing, incomplete removal, allergy to anesthesia, and recurrence were addressed. Prior to the procedure, the treatment site was clearly identified and confirmed by the patient. All components of Universal Protocol/PAUSE Rule completed.
Consent (Spinal Accessory)/Introductory Paragraph: The rationale for Mohs was explained to the patient and consent was obtained. The risks, benefits and alternatives to therapy were discussed in detail. Specifically, the risks of damage to the spinal accessory nerve, infection, scarring, bleeding, prolonged wound healing, incomplete removal, allergy to anesthesia, and recurrence were addressed. Prior to the procedure, the treatment site was clearly identified and confirmed by the patient. All components of Universal Protocol/PAUSE Rule completed.
Consent (Near Eyelid Margin)/Introductory Paragraph: The rationale for Mohs was explained to the patient and consent was obtained. The risks, benefits and alternatives to therapy were discussed in detail. Specifically, the risks of ectropion or eyelid deformity, infection, scarring, bleeding, prolonged wound healing, incomplete removal, allergy to anesthesia, nerve injury and recurrence were addressed. Prior to the procedure, the treatment site was clearly identified and confirmed by the patient. All components of Universal Protocol/PAUSE Rule completed.
Consent (Ear)/Introductory Paragraph: The rationale for Mohs was explained to the patient and consent was obtained. The risks, benefits and alternatives to therapy were discussed in detail. Specifically, the risks of ear deformity, infection, scarring, bleeding, prolonged wound healing, incomplete removal, allergy to anesthesia, nerve injury and recurrence were addressed. Prior to the procedure, the treatment site was clearly identified and confirmed by the patient. All components of Universal Protocol/PAUSE Rule completed.
Consent (Nose)/Introductory Paragraph: The rationale for Mohs was explained to the patient and consent was obtained. The risks, benefits and alternatives to therapy were discussed in detail. Specifically, the risks of nasal deformity, changes in the flow of air through the nose, infection, scarring, bleeding, prolonged wound healing, incomplete removal, allergy to anesthesia, nerve injury and recurrence were addressed. Prior to the procedure, the treatment site was clearly identified and confirmed by the patient. All components of Universal Protocol/PAUSE Rule completed.
Consent (Lip)/Introductory Paragraph: The rationale for Mohs was explained to the patient and consent was obtained. The risks, benefits and alternatives to therapy were discussed in detail. Specifically, the risks of lip deformity, changes in the oral aperture, infection, scarring, bleeding, prolonged wound healing, incomplete removal, allergy to anesthesia, nerve injury and recurrence were addressed. Prior to the procedure, the treatment site was clearly identified and confirmed by the patient. All components of Universal Protocol/PAUSE Rule completed.
Consent (Scalp)/Introductory Paragraph: The rationale for Mohs was explained to the patient and consent was obtained. The risks, benefits and alternatives to therapy were discussed in detail. Specifically, the risks of changes in hair growth pattern secondary to repair, infection, scarring, bleeding, prolonged wound healing, incomplete removal, allergy to anesthesia, nerve injury and recurrence were addressed. Prior to the procedure, the treatment site was clearly identified and confirmed by the patient. All components of Universal Protocol/PAUSE Rule completed.
Detail Level: Detailed
Postop Diagnosis: same
Surgeon: Dr. Johnson
Hemostasis: Electrocautery
Estimated Blood Loss (Cc): minimal
Stage 5: Additional Anesthesia Type: 1% lidocaine with epinephrine
Repair Anesthesia Method: local infiltration
Anesthesia Volume In Cc: 5
Brow Lift Text: A midfrontal incision was made medially to the defect to allow access to the tissues just superior to the left eyebrow. Following careful dissection inferiorly in a supraperiosteal plane to the level of the left eyebrow, several 3-0 monocryl sutures were used to resuspend the eyebrow orbicularis oculi muscular unit to the superior frontal bone periosteum. This resulted in an appropriate reapproximation of static eyebrow symmetry and correction of the left brow ptosis.
Epidermal Sutures: 5-0 Chromic Gut
Epidermal Closure: simple interrupted
Suturegard Intro: Intraoperative tissue expansion was performed, utilizing the SUTUREGARD device, in order to reduce wound tension.
Suturegard Body: The suture ends were repeatedly re-tightened and re-clamped to achieve the desired tissue expansion.
Hemigard Intro: Due to skin fragility and wound tension, it was decided to use HEMIGARD adhesive retention suture devices to permit a linear closure. The skin was cleaned and dried for a 6cm distance away from the wound. Excessive hair, if present, was removed to allow for adhesion.
Hemigard Postcare Instructions: The HEMIGARD strips are to remain completely dry for at least 5-7 days.
Donor Site Anesthesia Type: same as repair anesthesia
Graft Donor Site Dermal Sutures (Optional): 5-0 Monocryl
Graft Donor Site Bandage (Optional-Leave Blank If You Don't Want In Note): A pressure bandage was applied to the donor site.
Closure 2 Information: This tab is for additional flaps and grafts, including complex repair and grafts and complex repair and flaps. You can also specify a different location for the additional defect, if the location is the same you do not need to select a new one. We will insert the automated text for the repair you select below just as we do for solitary flaps and grafts. Please note that at this time if you select a location with a different insurance zone you will need to override the ICD10 and CPT if appropriate.
Closure 3 Information: This tab is for additional flaps and grafts above and beyond our usual structured repairs.  Please note if you enter information here it will not currently bill and you will need to add the billing information manually.
Closure 4 Information: This tab is for additional flaps and grafts above and beyond our usual structured repairs.  Please note if you enter information here it will not currently bill and you will need to add the billing information manually.
Wound Care: Petrolatum
Dressing: xeroform gauze
Dressing (No Sutures): pressure dressing with telfa
Suture Removal: 9 days
Unna Boot Text: An Unna boot was placed to help minimize edema and facilitate more rapid healing.
Home Suture Removal Text: Patient was provided instructions on removing sutures and will remove their sutures at home.  If they have any questions or difficulties they will call the office.
Post-Care Instructions: I reviewed with the patient in detail post-care instructions. Patient is not to engage in any heavy lifting, exercise, or swimming for the next 5 days. Should the patient develop bleeding, signs of infection, severe pain patient will contact the office immediately.
Opioid Counseling: A written handout about side effects and safe use of opioids was provided.  The patient was also verbally counseled about risk of sedation, not to drive, and constipation.
Pain Refusal Text: I offered to prescribe pain medication but the patient declined.
Mauc Instructions: By selecting yes to the question below the MAUC number will be added into the note.  This will be calculated automatically based on the diagnosis chosen, the size entered, the body zone selected (H,M,L) and the specific indications you chose. You will also have the option to override the Mohs AUC if you disagree with the automatically calculated number and this option is found in the Case Summary tab.
Where Do You Want The Question To Include Opioid Counseling Located?: Case Summary Tab
Eye Protection Verbiage: Before proceeding with the stage, the globe was anesthetized and a lubricated, scleral shield was inserted.  The shield was gently removed at the end of each stage.
Mohs Method Verbiage: A beveled incision following the standard Mohs approach was done and the specimen was harvested as a microscopic controlled layer.
Surgeon/Pathologist Verbiage (Will Incorporate Name Of Surgeon From Intro If Not Blank): operated in two distinct and integrated capacities as the surgeon and pathologist.
Mohs Histo Method Verbiage: Each section was then chromacoded, mapped and processed in the Mohs lab using the Mohs protocol.
Subsequent Stages Histo Method Verbiage: Using a similar technique to that described above, a thin layer of tissue was removed from all areas where tumor was visible on the previous stage.  The tissue was again oriented, mapped, dyed, and processed as above.
Mohs Rapid Report Verbiage: The area of clinically evident tumor was marked with skin marking ink and appropriately hatched.  The initial incision was made following the Mohs approach through the skin.  The specimen was taken to the lab, divided into the necessary number of pieces, chromacoded and processed according to the Mohs protocol.  This was repeated in successive stages until a tumor free defect was achieved.
Complex Repair Preamble Text (Leave Blank If You Do Not Want): Wide undermining equal or greated to the maximum width of the defect was performed along at least one edge of the wound to minimize wound tension and allow for a more elegant closure.
Intermediate Repair Preamble Text (Leave Blank If You Do Not Want): Undermining was performed to minimize wound tension.
Crescentic Complex Repair Preamble Text (Leave Blank If You Do Not Want): Extensive wide undermining was performed.
Crescentic Intermediate Repair Preamble Text (Leave Blank If You Do Not Want): Undermining was performed with blunt dissection.
Non-Graft Cartilage Fenestration Text: The cartilage was fenestrated with a 2mm punch biopsy to help facilitate healing.
Graft Cartilage Fenestration Text: The cartilage was fenestrated with a 15 blade to help facilitate graft survival and healing.
Secondary Intention Text (Leave Blank If You Do Not Want): The defect will heal with secondary intention.
No Repair - Repaired With Adjacent Surgical Defect Text (Leave Blank If You Do Not Want): After obtaining clear surgical margins the defect was repaired concurrently with an adjacent surgical defect.
Referred To Oculoplastics For Closure Text (Leave Blank If You Do Not Want): After obtaining clear surgical margins the patient was discharged to oculoplastics for surgical repair.  The patient understands they will receive post-surgical care and follow-up from the oculoplastic physician.
Adjacent Tissue Transfer Text: The defect edges were debeveled with a #15 scalpel blade.  Given the location of the defect and the proximity to free margins an adjacent tissue transfer was deemed most appropriate.  Using a sterile surgical marker, an appropriate flap was drawn incorporating the defect and placing the expected incisions within the relaxed skin tension lines where possible.    The area thus outlined was incised deep to adipose tissue with a #15 scalpel blade.  The skin margins were undermined to an appropriate distance in all directions utilizing iris scissors.
Advancement Flap (Single) Text: The defect edges were debeveled with a #15 scalpel blade.  Given the location and shape of the defect a single advancement flap was deemed most appropriate.  Using a sterile surgical marker, an appropriate advancement flap was drawn incorporating the defect and placing the expected incisions to access skin laxity and within the relaxed skin tension lines where possible.    The area thus outlined was incised with a #15 Personna scalpel blade.  The skin margins were undermined in a subcutaneous plane to allow flap advancement under minimal tension.
Advancement Flap (Double) Text: The defect edges were debeveled with a #15 scalpel blade.  Given the location and shape of the defect a double advancement flap was deemed most appropriate.  Using a sterile surgical marker, the appropriate advancement flaps were drawn incorporating the defect and placing the expected incisions to access skin laxity and within the relaxed skin tension lines where possible.    The area thus outlined was incised with a #15 Personna scalpel blade.  The skin margins were undermined in a subcutaneous plane to allow flap advancement with minimal tension.
Burow's Advancement Flap Text: The defect edges were debeveled with a #15 scalpel blade.  Given the location of the defect and the proximity to free margins a Burow's advancement flap was deemed most appropriate.  Using a sterile surgical marker, the appropriate advancement flap was drawn incorporating the defect and placing the expected incisions within the relaxed skin tension lines where possible.    The area thus outlined was incised deep to adipose tissue with a #15 scalpel blade.  The skin margins were undermined to an appropriate distance in all directions utilizing iris scissors.
Chonodrocutaneous Helical Advancement Flap Text: The defect edges were debeveled with a #15 scalpel blade.  Given the location of the defect and the proximity to free margins a chondrocutaneous helical advancement flap was deemed most appropriate.  Using a sterile surgical marker, the appropriate advancement flap was drawn incorporating the defect and placing the expected incisions within the relaxed skin tension lines where possible.    The area thus outlined was incised deep to adipose tissue with a #15 scalpel blade.  The skin margins were undermined to an appropriate distance in all directions utilizing iris scissors.
Crescentic Advancement Flap Text: The defect edges were debeveled with a #15 scalpel blade.  Given the location of the defect and the proximity to free margins a crescentic advancement flap was deemed most appropriate.  Using a sterile surgical marker, the appropriate advancement flap was drawn incorporating the defect and placing the expected incisions within the relaxed skin tension lines where possible.    The area thus outlined was incised deep to adipose tissue with a #15 scalpel blade.  The skin margins were undermined to an appropriate distance in all directions utilizing iris scissors.
A-T Advancement Flap Text: The defect edges were debeveled with a #15 scalpel blade.  Given the location of the defect, shape of the defect and the proximity to free margins an A-T advancement flap was deemed most appropriate.  Using a sterile surgical marker, an appropriate advancement flap was drawn incorporating the defect and placing the expected incisions within the relaxed skin tension lines where possible.    The area thus outlined was incised deep to adipose tissue with a #15 scalpel blade.  The skin margins were undermined to an appropriate distance in all directions utilizing iris scissors.
O-T Advancement Flap Text: The defect edges were debeveled with a #15 scalpel blade.  Given the location and shape of the defect an O-T advancement flap was deemed most appropriate.  Using a sterile surgical marker, an appropriate advancement flap was drawn incorporating the defect and placing the expected incisions to access surrounding tissue laxity and within the relaxed skin tension lines where possible.    The area thus outlined was incised with a #15 Personna scalpel blade.  The skin margins were undermined to an appropriate distance to allow flap advancement under minimial tension.
O-L Flap Text: The defect edges were debeveled with a #15 scalpel blade.  Given the location of the defect, shape of the defect and the proximity to free margins an O-L flap was deemed most appropriate.  Using a sterile surgical marker, an appropriate advancement flap was drawn incorporating the defect and placing the expected incisions within the relaxed skin tension lines where possible.    The area thus outlined was incised deep to adipose tissue with a #15 scalpel blade.  The skin margins were undermined to an appropriate distance in all directions utilizing iris scissors.
O-Z Flap Text: The defect edges were debeveled with a #15 scalpel blade.  Given the location of the defect, shape of the defect and the proximity to free margins an O-Z flap was deemed most appropriate.  Using a sterile surgical marker, an appropriate transposition flap was drawn incorporating the defect and placing the expected incisions within the relaxed skin tension lines where possible. The area thus outlined was incised deep to adipose tissue with a #15 scalpel blade.  The skin margins were undermined to an appropriate distance in all directions utilizing iris scissors.
Double O-Z Flap Text: The defect edges were debeveled with a #15 scalpel blade.  Given the location of the defect, shape of the defect and the proximity to free margins a Double O-Z flap was deemed most appropriate.  Using a sterile surgical marker, an appropriate transposition flap was drawn incorporating the defect and placing the expected incisions within the relaxed skin tension lines where possible. The area thus outlined was incised deep to adipose tissue with a #15 scalpel blade.  The skin margins were undermined to an appropriate distance in all directions utilizing iris scissors.
V-Y Flap Text: The defect edges were debeveled with a #15 scalpel blade.  Given the location and shape of the defect a V-Y flap was deemed most appropriate.  Using a sterile surgical marker, an appropriate flap was drawn incorporating the defect and placing the expected incisions within the relaxed skin tension lines where possible.  The area thus outlined was incised with a #15 scalpel blade.  The surrounding tissue margins were undermined to allow flap advancement with minimal-to-no tension while maintaining a robust, central pedicle.
Advancement-Rotation Flap Text: The defect edges were debeveled with a #15 scalpel blade.  Given the location of the defect, shape of the defect and the proximity to free margins an advancement-rotation flap was deemed most appropriate.  Using a sterile surgical marker, an appropriate flap was drawn incorporating the defect and placing the expected incisions within the relaxed skin tension lines where possible. The area thus outlined was incised deep to adipose tissue with a #15 scalpel blade.  The skin margins were undermined to an appropriate distance in all directions utilizing iris scissors.
Mercedes Flap Text: The defect edges were debeveled with a #15 scalpel blade.  Given the location and shape of the defect a Mercedes flap was deemed most appropriate.  Using a sterile surgical marker, an appropriate triple advancement flap was drawn incorporating the defect and placing the expected incisions to access skin laxity where possible. The area thus outlined was incised with a #15 scalpel blade.  The skin margins were widely undermined in the subcutaneous plane to minimize tension.
Modified Advancement Flap Text: The defect edges were debeveled with a #15 scalpel blade.  Given the location and shape of the defect and the proximity to free margins a modified advancement flap was deemed most appropriate.  Using a sterile surgical marker, an appropriate advancement flap was drawn incorporating the defect and placing the expected incisions superiorly on the nose and inferior-medially over the columella.    The area thus outlined was incised with a #15 scalpel blade.  The skin margins were undermined submuscular to minimize wound tension.
Mucosal Advancement Flap Text: Given the location of the defect, shape of the defect and the proximity to free margins a mucosal advancement flap was deemed most appropriate. Incisions were made with a 15 blade scalpel in the appropriate fashion along the cutaneous vermilion border and the mucosal lip. The remaining actinically damaged mucosal tissue was excised.  The mucosal advancement flap was then elevated to the gingival sulcus with care taken to preserve the neurovascular structures and advanced into the primary defect. Care was taken to ensure that precise realignment of the vermilion border was achieved.
Peng Advancement Flap Text: The defect edges were debeveled with a #15 scalpel blade.  Given the location of the defect, shape of the defect and the proximity to free margins a Peng advancement flap was deemed most appropriate.  Using a sterile surgical marker, an appropriate advancement flap was drawn incorporating the defect and placing the expected incisions within the relaxed skin tension lines where possible. The area thus outlined was incised deep to adipose tissue with a #15 scalpel blade.  The skin margins were undermined to an appropriate distance in all directions utilizing iris scissors.
Hatchet Flap Text: The defect edges were debeveled with a #15 scalpel blade.  Given the location and shape of the defect and the proximity to free margins a hatchet flap was deemed most appropriate.  Using a sterile surgical marker, an appropriate hatchet flap was drawn incorporating the defect and placing the expected incisions within the relaxed skin tension lines where possible.    The area thus outlined was incised with a #15 scalpel blade.  The skin margins were undermined in a submuscular plane to an appropriate distance in all directions to allow for flap closure under minimal tension.
Rotation Flap Text: The defect edges were debeveled with a #15 scalpel blade.  Given the location and shape of the defect a rotation flap was deemed most appropriate.  Using a sterile surgical marker, an appropriate rotation flap was drawn incorporating the defect and placing the expected incisions to access skin laxity and within the relaxed skin tension lines where possible.    The area thus outlined was incised with a #15 scalpel blade.  The skin margins were undermined to an appropriate distance to allow for closure under minimal tension.
Spiral Flap Text: The defect edges were debeveled with a #15 scalpel blade.  Given the location and shape of the defect, and the proximity to free margins a spiral flap was deemed most appropriate.  Using a sterile surgical marker, an appropriate rotation flap was drawn incorporating the defect and placing the expected incisions to access skin laxity and within the relaxed skin tension lines where possible. The area thus outlined was incised with a #15 scalpel blade.  The skin margins were undermined to an appropriate distance in all directions to minimize tension with closure.
Staged Advancement Flap Text: The defect edges were debeveled with a #15 scalpel blade.  Given the location of the defect, shape of the defect and the proximity to free margins a staged advancement flap was deemed most appropriate.  Using a sterile surgical marker, an appropriate advancement flap was drawn incorporating the defect and placing the expected incisions within the relaxed skin tension lines where possible. The area thus outlined was incised deep to adipose tissue with a #15 scalpel blade.  The skin margins were undermined to an appropriate distance in all directions utilizing iris scissors.
Star Wedge Flap Text: The defect edges were debeveled with a #15 scalpel blade.  Given the location of the defect, shape of the defect and the proximity to free margins a star wedge flap was deemed most appropriate.  Using a sterile surgical marker, an appropriate rotation flap was drawn incorporating the defect and placing the expected incisions within the relaxed skin tension lines where possible. The area thus outlined was incised deep to adipose tissue with a #15 scalpel blade.  The skin margins were undermined to an appropriate distance in all directions utilizing iris scissors.
Transposition Flap Text: The defect edges were debeveled with a #15 scalpel blade.  Given the location and shape of the defect, a transposition flap was deemed most appropriate.  Using a sterile surgical marker, an appropriate transposition flap was drawn incorporating the defect, accessing surrounding skin laxity.    The area thus outlined was incised with a #15 scalpel blade.  The skin margins were undermined to minimize wound tension.
Muscle Hinge Flap Text: The defect edges were debeveled with a #15 scalpel blade.  Given the size, depth and location of the defect a muscle hinge flap was deemed most appropriate.  Using a sterile surgical marker, an appropriate hinge flap was drawn incorporating the defect. The area thus outlined was incised with a #15 scalpel blade and the cutaneous layer  from the underlying muscle.  The muscular flap was then incised, lifted and flipped into the primary defect.  The secondary defect cutaneous layer was then replaced and closed in a standard, layered fashion.
Mustarde Flap Text: The defect edges were debeveled with a #15 scalpel blade.  Given the size, depth and location of the defect and the proximity to free margins a Mustarde flap was deemed most appropriate.  Using a sterile surgical marker, an appropriate flap was drawn incorporating the defect. The area thus outlined was incised with a #15 scalpel blade.  The skin margins were undermined to an appropriate distance in all directions utilizing iris scissors.
Nasal Turnover Hinge Flap Text: The defect edges were debeveled with a #15 scalpel blade.  Given the size, depth, location of the defect and the defect being full thickness a nasal turnover hinge flap was deemed most appropriate.  Using a sterile surgical marker, an appropriate hinge flap was drawn incorporating the defect. The area thus outlined was incised with a #15 scalpel blade. The flap was designed to recreate the nasal mucosal lining and the alar rim. The skin margins were undermined to an appropriate distance in all directions utilizing iris scissors.
Nasalis-Muscle-Based Myocutaneous Island Pedicle Flap Text: Using a #15 blade, an incision was made around the donor flap to the level of the nasalis muscle. Wide lateral undermining was then performed in both the subcutaneous plane above the nasalis muscle, and in a submuscular plane just above periosteum. This allowed the formation of a free nasalis muscle axial pedicle (based on the angular artery) which was still attached to the actual cutaneous flap, increasing its mobility and vascular viability. Hemostasis was obtained with pinpoint electrocoagulation. The flap was mobilized into position and the pivotal anchor points positioned and stabilized with buried interrupted sutures. Subcutaneous and dermal tissues were closed in a multilayered fashion with sutures. Tissue redundancies were excised, and the epidermal edges were apposed without significant tension and sutured with sutures.
Orbicularis Oris Muscle Flap Text: The defect edges were debeveled with a #15 scalpel blade.  Given that the defect affected the competency of the oral sphincter an obicularis oris muscle flap was deemed most appropriate to restore this competency and normal muscle function.  Using a sterile surgical marker, an appropriate flap was drawn incorporating the defect. The area thus outlined was incised with a #15 scalpel blade.
Melolabial Transposition Flap Text: The defect edges were debeveled with a #15 scalpel blade.  Given the location, shape and depth of the defect, and the desire to limit reconstruction to a single-stage procedure, a melolabial flap was deemed most appropriate.  Using a sterile surgical marker, an appropriate melolabial transposition flap was drawn incorporating the defect and extending along the nasolabial fold.    The area thus outlined was incised with a #15 scalpel blade and the flap lifted distal to proximal, maintaining a robust vascular pedicle.  The surrounding tissue was widely undermined in a subcutaneous plane.  A pexing suture was used to advance the cheek to the piriform aperture and close much of the secondary defect.  The flap was then transposed, thinned and trimmed to match the primary defect.
Rhombic Flap Text: The defect edges were debeveled with a #15 scalpel blade.  Given the location and shape of the defect a rhombic flap was deemed most appropriate.  Using a sterile surgical marker, an appropriate rhombic flap was drawn incorporating the defect and access surrounding tissue laxity.    The area thus outlined was incised with a #15 scalpel blade.  The skin margins were undermined to an appropriate distance in all directions to minimize tension with closure.
Rhomboid Transposition Flap Text: The defect edges were debeveled with a #15 scalpel blade.  Given the location of the defect and the proximity to free margins a rhomboid transposition flap was deemed most appropriate.  Using a sterile surgical marker, an appropriate rhomboid flap was drawn incorporating the defect.    The area thus outlined was incised deep to adipose tissue with a #15 scalpel blade.  The skin margins were undermined to an appropriate distance in all directions utilizing iris scissors.
Bi-Rhombic Flap Text: The defect edges were debeveled with a #15 scalpel blade.  Given the location of the defect and the proximity to free margins a bi-rhombic flap was deemed most appropriate.  Using a sterile surgical marker, appropriate rhombic flaps were drawn incorporating the defect. The area thus outlined was incised deep with a #15 scalpel blade.  The skin margins were undermined to an appropriate distance in all directions to minimize tension with closure.
Helical Rim Advancement Flap Text: The defect edges were debeveled with a #15 blade scalpel.  Given the location and shape of the defect a helical rim advancement flap was deemed most appropriate.  Using a sterile surgical marker, the appropriate advancement flap was drawn incorporating the defect and placing the expected incision along the helical crease to the ear lobule.  The area thus outlined was incised to the posterior cutaneous surface with a #15 scalpel blade.  The flap is the undermined to the post-auricular sulcus to allow advancement under minimal tension.  If necessary the cartilaginous rim is trimmed and thinned to optimize closure.
Bilateral Helical Rim Advancement Flap Text: The defect edges were debeveled with a #15 blade scalpel.  Given the location of the defect and the proximity to free margins (helical rim) a bilateral helical rim advancement flap was deemed most appropriate.  Using a sterile surgical marker, the appropriate advancement flaps were drawn incorporating the defect and placing the expected incisions between the helical rim and antihelix where possible.  The area thus outlined was incised through and through with a #15 scalpel blade.  With a skin hook and iris scissors, the flaps were gently and sharply undermined and freed up.
Ear Star Wedge Flap Text: The defect edges were debeveled with a #15 blade scalpel.  Given the location of the defect and the proximity to free margins (helical rim) an ear star wedge flap was deemed most appropriate.  Using a sterile surgical marker, the appropriate flap was drawn incorporating the defect and placing the expected incisions between the helical rim and antihelix where possible.  The area thus outlined was incised through and through with a #15 scalpel blade.
Banner Transposition Flap Text: The defect edges were debeveled with a #15 scalpel blade.  Given the location and shape of the defect a Banner transposition flap was deemed most appropriate.  Using a sterile surgical marker, an appropriate flap drawn around the defect to access adjacent skin laxity. The area thus outlined was incised deep to adipose tissue with a #15 scalpel blade.  The skin margins were undermined to an appropriate distance in all directions maintaining a robust vascular pedicle.
Bilobed Flap Text: The defect edges were debeveled with a #15 scalpel blade.  Given the location of the defect and the proximity to free margins a Zitelli-modified bilobed transposition flap was deemed most appropriate.  Using a sterile surgical marker, an appropriate bilobed flap was measured and drawn around the defect undergoing a 90-degree arc of rotation.    The area thus outlined was incised with a #15 scalpel blade.  The skin margins were undermined submuscularly to allow flap closure with minimal tension.
Bilobed Transposition Flap Text: The defect edges were debeveled with a #15 scalpel blade.  Given the location of the defect and the proximity to free margins a Zitelli-modified bilobed transposition flap was deemed most appropriate.  Using a sterile surgical marker, an appropriate bilobed flap was measured and drawn around the defect undergoing a 90-degree arc of rotation.    The area thus outlined was incised with a #15 scalpel blade.  The skin margins were undermined submuscularly to allow flap closure with minimal tension.
Trilobed Flap Text: Given the location and shape of the defect and the proximity to free margins a trilobed transposition flap was deemed most appropriate.  Using a sterile surgical marker, an appropriate trilobed flap was measured and drawn around the defect undergoing a 135-degree arc of rotation.    The area thus outlined was incised with a #15 scalpel blade.  The skin margins were undermined submuscularly to allow closure with minimal tension and minimize disruption of adjacent free margins.
Dorsal Nasal Flap Text: The defect edges were debeveled with a #15 scalpel blade.  Given the location and shape of the defect and the proximity to free margins a dorsal nasal flap was deemed most appropriate.  Using a sterile surgical marker, an appropriate dorsal nasal flap was drawn from the defect, along the nasofacial sulcus and onto the glabella.    The area thus outlined was incised deep to adipose tissue along the glabella and submuscular over the nose with a #15 scalpel blade.  The skin margins were widely undermined to an appropriate distance in all directions.
Island Pedicle Flap Text: The defect edges were debeveled with a #15 scalpel blade.  Given the location of the defect, shape of the defect and the proximity to free margins an island pedicle advancement flap was deemed most appropriate.  Using a sterile surgical marker, an appropriate advancement flap was drawn incorporating the defect, outlining the appropriate donor tissue and placing the expected incisions within the relaxed skin tension lines where possible.    The area thus outlined was incised deep to adipose tissue with a #15 scalpel blade.  The skin margins were undermined to an appropriate distance in all directions around the primary defect and laterally outward around the island pedicle utilizing iris scissors.  There was minimal undermining beneath the pedicle flap.
Island Pedicle Flap With Canthal Suspension Text: The defect edges were debeveled with a #15 scalpel blade.  Given the location of the defect, shape of the defect and the proximity to free margins an island pedicle advancement flap was deemed most appropriate.  Using a sterile surgical marker, an appropriate advancement flap was drawn incorporating the defect, outlining the appropriate donor tissue and placing the expected incisions within the relaxed skin tension lines where possible. The area thus outlined was incised deep to adipose tissue with a #15 scalpel blade.  The skin margins were undermined to an appropriate distance in all directions around the primary defect and laterally outward around the island pedicle utilizing iris scissors.  There was minimal undermining beneath the pedicle flap. A suspension suture was placed in the canthal tendon to prevent tension and prevent ectropion.
Alar Island Pedicle Flap Text: The defect edges were debeveled with a #15 scalpel blade.  Given the location of the defect, shape of the defect and the proximity to the alar rim an island pedicle advancement flap was deemed most appropriate.  Using a sterile surgical marker, an appropriate advancement flap was drawn incorporating the defect, outlining the appropriate donor tissue and placing the expected incisions within the nasal ala running parallel to the alar rim. The area thus outlined was incised with a #15 scalpel blade.  The skin margins were undermined minimally to an appropriate distance in all directions around the primary defect and laterally outward around the island pedicle utilizing iris scissors.  There was minimal undermining beneath the pedicle flap.
Double Island Pedicle Flap Text: The defect edges were debeveled with a #15 scalpel blade.  Given the location of the defect, shape of the defect and the proximity to free margins a double island pedicle advancement flap was deemed most appropriate.  Using a sterile surgical marker, an appropriate advancement flap was drawn incorporating the defect, outlining the appropriate donor tissue and placing the expected incisions within the relaxed skin tension lines where possible.    The area thus outlined was incised deep to adipose tissue with a #15 scalpel blade.  The skin margins were undermined to an appropriate distance in all directions around the primary defect and laterally outward around the island pedicle utilizing iris scissors.  There was minimal undermining beneath the pedicle flap.
Island Pedicle Flap-Requiring Vessel Identification Text: The defect edges were debeveled with a #15 scalpel blade.  Given the location of the defect, shape of the defect and the proximity to free margins an island pedicle advancement flap was deemed most appropriate.  Using a sterile surgical marker, an appropriate advancement flap was drawn, based on the axial vessel mentioned above, incorporating the defect, outlining the appropriate donor tissue and placing the expected incisions within the relaxed skin tension lines where possible.    The area thus outlined was incised deep to adipose tissue with a #15 scalpel blade.  The skin margins were undermined to an appropriate distance in all directions around the primary defect and laterally outward around the island pedicle utilizing iris scissors.  There was minimal undermining beneath the pedicle flap.
Keystone Flap Text: The defect edges were debeveled with a #15 scalpel blade.  Given the location and shape of the defect a keystone flap was deemed most appropriate to access adjacent skin laxity.  Using a sterile surgical marker, an appropriate keystone flap was drawn incorporating the defect and outlining the appropriate donor tissue. The area thus outlined was incised deep to adipose tissue with a #15 scalpel blade.  The surrounding skin margins were undermined to an appropriate distance in all directions maintaining a robust central pedicle.  Tension holding sutures were then placed to advance the flap and then the tissue closed in a standard layered fashion.
O-T Plasty Text: The defect edges were debeveled with a #15 scalpel blade.  Given the location of the defect, shape of the defect and the proximity to free margins an O-T plasty was deemed most appropriate.  Using a sterile surgical marker, an appropriate O-T plasty was drawn incorporating the defect and placing the expected incisions within the relaxed skin tension lines where possible.    The area thus outlined was incised deep to adipose tissue with a #15 scalpel blade.  The skin margins were undermined to an appropriate distance in all directions utilizing iris scissors.
O-Z Plasty Text: The defect edges were debeveled with a #15 scalpel blade.  Given the location of the defect, shape of the defect and the proximity to free margins an O-Z plasty (double transposition flap) was deemed most appropriate.  Using a sterile surgical marker, the appropriate transposition flaps were drawn incorporating the defect and placing the expected incisions within the relaxed skin tension lines where possible.    The area thus outlined was incised deep to adipose tissue with a #15 scalpel blade.  The skin margins were undermined to an appropriate distance in all directions utilizing iris scissors.  Hemostasis was achieved with electrocautery.  The flaps were then transposed into place, one clockwise and the other counterclockwise, and anchored with interrupted buried subcutaneous sutures.
Double O-Z Plasty Text: The defect edges were debeveled with a #15 scalpel blade.  Given the location of the defect, shape of the defect and the proximity to free margins a Double O-Z plasty (double transposition flap) was deemed most appropriate.  Using a sterile surgical marker, the appropriate transposition flaps were drawn incorporating the defect and placing the expected incisions within the relaxed skin tension lines where possible. The area thus outlined was incised deep to adipose tissue with a #15 scalpel blade.  The skin margins were undermined to an appropriate distance in all directions utilizing iris scissors.  Hemostasis was achieved with electrocautery.  The flaps were then transposed into place, one clockwise and the other counterclockwise, and anchored with interrupted buried subcutaneous sutures.
V-Y Plasty Text: The defect edges were debeveled with a #15 scalpel blade.  Given the location of the defect, shape of the defect and the proximity to free margins an V-Y advancement flap was deemed most appropriate.  Using a sterile surgical marker, an appropriate advancement flap was drawn incorporating the defect and placing the expected incisions within the relaxed skin tension lines where possible.    The area thus outlined was incised deep to adipose tissue with a #15 scalpel blade.  The skin margins were undermined to an appropriate distance in all directions utilizing iris scissors.
H Plasty Text: Given the location of the defect, shape of the defect and the proximity to free margins a H-plasty was deemed most appropriate for repair.  Using a sterile surgical marker, the appropriate advancement arms of the H-plasty were drawn incorporating the defect and placing the expected incisions within the relaxed skin tension lines where possible. The area thus outlined was incised deep to adipose tissue with a #15 scalpel blade. The skin margins were undermined to an appropriate distance in all directions utilizing iris scissors.  The opposing advancement arms were then advanced into place in opposite direction and anchored with interrupted buried subcutaneous sutures.
W Plasty Text: The lesion was extirpated to the level of the fat with a #15 scalpel blade.  Given the location of the defect, shape of the defect and the proximity to free margins a W-plasty was deemed most appropriate for repair.  Using a sterile surgical marker, the appropriate transposition arms of the W-plasty were drawn incorporating the defect and placing the expected incisions within the relaxed skin tension lines where possible.    The area thus outlined was incised deep to adipose tissue with a #15 scalpel blade.  The skin margins were undermined to an appropriate distance in all directions utilizing iris scissors.  The opposing transposition arms were then transposed into place in opposite direction and anchored with interrupted buried subcutaneous sutures.
Z Plasty Text: The lesion was extirpated to the level of the fat with a #15 scalpel blade.  Given the location of the defect, shape of the defect and the proximity to free margins a Z-plasty was deemed most appropriate for repair.  Using a sterile surgical marker, the appropriate transposition arms of the Z-plasty were drawn incorporating the defect and placing the expected incisions within the relaxed skin tension lines where possible.    The area thus outlined was incised deep to adipose tissue with a #15 scalpel blade.  The skin margins were undermined to an appropriate distance in all directions utilizing iris scissors.  The opposing transposition arms were then transposed into place in opposite direction and anchored with interrupted buried subcutaneous sutures.
Zygomaticofacial Flap Text: Given the location of the defect, shape of the defect and the proximity to free margins a zygomaticofacial flap was deemed most appropriate for repair.  Using a sterile surgical marker, the appropriate flap was drawn incorporating the defect and placing the expected incisions within the relaxed skin tension lines where possible. The area thus outlined was incised deep to adipose tissue with a #15 scalpel blade with preservation of a vascular pedicle.  The skin margins were undermined to an appropriate distance in all directions utilizing iris scissors.  The flap was then placed into the defect and anchored with interrupted buried subcutaneous sutures.
Cheek Interpolation Flap Text: A decision was made to reconstruct the defect utilizing an interpolation  staged reconstruction.  A template was made of the defect.  This  template was then used to outline the flap along the nasolabial fold.    The flap was incised with a 15 blade and lifted distal to proximal, preserving a robust vascular based pedicle.  The edges of the donor site were undermined.   The donor site was closed in a primary layered fashion.  The flap was then rotated into position, trimmed and thinned  and sutured.   The pedicle was then wrapped with xeroform gauze and dressed appropriately with a telfa and gauze bandage.
Cheek-To-Nose Interpolation Flap Text: A decision was made to reconstruct the defect utilizing an interpolation  staged reconstruction.  A template was made of the defect.  This  template was then used to outline the flap along the nasolabial fold.    The flap was incised with a 15 blade and lifted distal to proximal, preserving a robust vascular based pedicle.  The edges of the donor site were undermined.   The donor site was closed in a primary layered fashion.  The flap was then rotated into position, trimmed and thinned  and sutured.  A nasal cone was placed to promote airway patency and flap placement.  The pedicle was then wrapped with xeroform gauze and dressed appropriately with a telfa and gauze bandage.
Interpolation Flap Text: A decision was made to reconstruct the defect utilizing an interpolation axial flap and a staged reconstruction.  A telfa template was made of the defect.  This telfa template was then used to outline the interpolation flap.  The donor area for the pedicle flap was then injected with anesthesia.  The flap was excised through the skin and subcutaneous tissue down to the layer of the underlying musculature.  The interpolation flap was carefully excised within this deep plane to maintain its blood supply.  The edges of the donor site were undermined.   The donor site was closed in a primary fashion.  The pedicle was then rotated into position and sutured.  The pedicle was then wrapped with xeroform gauze and dressed appropriately with a telfa and gauze bandage to ensure continued blood supply and protect the attached pedicle.
Melolabial Interpolation Flap Text: A decision was made to reconstruct the defect utilizing an interpolation axial flap and a staged reconstruction.  A telfa template was made of the defect.  This telfa template was then used to outline the melolabial interpolation flap.  The donor area for the pedicle flap was then injected with anesthesia.  The flap was excised through the skin and subcutaneous tissue down to the layer of the underlying musculature.  The pedicle flap was carefully excised within this deep plane to maintain its blood supply.  The edges of the donor site were undermined.   The donor site was closed in a primary fashion.  The pedicle was then rotated into position and sutured.  Once the tube was sutured into place, adequate blood supply was confirmed with blanching and refill.  The pedicle was then wrapped with xeroform gauze and dressed appropriately with a telfa and gauze bandage to ensure continued blood supply and protect the attached pedicle.
Mastoid Interpolation Flap Text: A decision was made to reconstruct the defect utilizing an interpolation  staged reconstruction.  A telfa template was made of the defect.  This telfa template was then used to outline the mastoid interpolation flap.  The donor area for the pedicle flap was then injected with anesthesia.  The flap was excised through the skin and subcutaneous tissue down to the layer of the underlying musculature.  The pedicle flap was carefully excised within this deep plane to maintain its blood supply.  The edges of the donor site were undermined.   The donor site was closed in a primary fashion.  The pedicle was then rotated into position and sutured.  Once the tube was sutured into place, adequate blood supply was confirmed with blanching and refill.  The pedicle was then wrapped with xeroform gauze and dressed appropriately with a telfa and gauze bandage to ensure continued blood supply and protect the attached pedicle.
Posterior Auricular Interpolation Flap Text: A decision was made to reconstruct the defect utilizing an interpolation staged reconstruction.  The ear was pressed back against the scalp and a flap outline created using a sterile marker from the post-auricular surface, slightly widening onto the scalp.  The donor area for the pedicle flap was then injected with anesthesia.  The flap was excised through the skin and subcutaneous tissue down to the layer of the underlying musculature.  The pedicle flap was carefully excised within this deep plane to maintain its blood supply.  The edges of the donor site were undermined.   The flap was then advanced over the helical rim and sutured into position.  The donor site was partially closed leaving a postauricular dead space.  The pedicle and deadspace were bandaged with xeroform gauze and a telfa/gauze bandage.
Paramedian Forehead Flap Text: After consultation with the patient and a review of the expected surgical process, a decision was made to reconstruct the defect utilizing an interpolation axial flap and a staged reconstruction.  A template was made of the defect.  The glabellar frown lines were marked and the pedicle marked from 3mm medial and 5mm lateral to this landmark.  The template was then used to outline the paramedian forehead pedicle flap extending from the orbital rim on to the forehead. \\n Gauze was used to measure from the flap to the defect to ensure adequate reach.  Supratrochlear and supraorbital nerve blocks were done and the donor area for the pedicle flap was  injected with anesthesia.  The flap was excised through the skin and subcutaneous tissue down to the layer of the underlying musculature.  The flap was then lifted in the subcutaneous plane superiorly and then more deeply and bluntly over the orbital rim to preserve the supratrochlear artery. \\n The edges of the donor site were undermined submuscularly and .   the donor site was closed in a primary trilaminar (fascia-muscle, adipose-dermis, dermis-epidermis fashion.  The pedicle was then rotated into position, thinned, and sutured.  The pedicle was then wrapped with xeroform gauze and dressed appropriately with a telfa and gauze bandage to protect the attached pedicle.
Abbe Flap (Upper To Lower Lip) Text: The defect of the lower lip was assessed and measured.  Given the location and size of the defect, an Abbe flap was deemed most appropriate.  Using a sterile surgical marker, an appropriate Abbe flap was measured and drawn on the upper lip. Local anesthesia was then infiltrated.  A scalpel was then used to incise the upper lip through and through the skin, vermilion, muscle and mucosa, leaving the flap pedicled on the opposite side.  The flap was then rotated and transferred to the lower lip defect.  The flap was then sutured into place with a three layer technique, closing the orbicularis oris muscle layer with subcutaneous buried sutures, followed by a mucosal layer and an epidermal layer.
Abbe Flap (Lower To Upper Lip) Text: The defect of the upper lip was assessed and measured.  Given the location and size of the defect, an Abbe flap was deemed most appropriate.  Using a sterile surgical marker, an appropriate Abbe flap was measured and drawn on the lower lip. Local anesthesia was then infiltrated. A scalpel was then used to incise the upper lip through and through the skin, vermilion, muscle and mucosa, leaving the flap pedicled on the opposite side.  The flap was then rotated and transferred to the lower lip defect.  The flap was then sutured into place with a three layer technique, closing the orbicularis oris muscle layer with subcutaneous buried sutures, followed by a mucosal layer and an epidermal layer.
Estlander Flap (Upper To Lower Lip) Text: The defect of the lower lip was assessed and measured.  Given the location and size of the defect, an Estlander flap was deemed most appropriate.  Using a sterile surgical marker, an appropriate Estlander flap was measured and drawn on the upper lip. Local anesthesia was then infiltrated. A scalpel was then used to incise the lateral aspect of the flap, through skin, muscle and mucosa, leaving the flap pedicled medially.  The flap was then rotated and positioned to fill the lower lip defect.  The flap was then sutured into place with a three layer technique, closing the orbicularis oris muscle layer with subcutaneous buried sutures, followed by a mucosal layer and an epidermal layer.
Estlander Flap (Lower To Upper Lip) Text: The defect of the lower lip was assessed and measured.  Given the location and size of the defect, an Estlander flap was deemed most appropriate.  Using a sterile surgical marker, an appropriate Estlander flap was measured and drawn on the upper lip. Local anesthesia was then infiltrated. A scalpel was then used to incise the lateral aspect of the flap, through skin, muscle and mucosa, leaving the flap pedicled medially.  The flap was then rotated and positioned to fill the lower lip defect.  The flap was then sutured into place with a three layer technique, closing the orbicularis oris muscle layer with subcutaneous buried sutures, followed by a mucosal layer and an epidermal layer.
Cheiloplasty (Less Than 50%) Text: A decision was made to reconstruct the defect with a  cheiloplasty.  The defect was undermined extensively.  Additional obicularis oris muscle was excised with a 15 blade scalpel.  The defect was converted into a full thickness wedge, of less than 50% of the vertical height of the lip, to facilite a better cosmetic result.  Small vessels were then tied off with 5-0 monocyrl. The obicularis oris, superficial fascia, adipose and dermis were then reapproximated.  After the deeper layers were approximated the epidermis was reapproximated with particular care given to realign the vermilion border.
Cheiloplasty (Complex) Text: A decision was made to reconstruct the defect with a  cheiloplasty.  The defect was undermined extensively.  Additional obicularis oris muscle was excised with a 15 blade scalpel.  The defect was converted into a full thickness wedge to facilite a better cosmetic result.  Small vessels were then tied off with 5-0 monocyrl. The obicularis oris, superficial fascia, adipose and dermis were then reapproximated.  After the deeper layers were approximated the epidermis was reapproximated with particular care given to realign the vermilion border.
Ear Wedge Repair Text: A wedge excision was completed by carrying down an excision through the full thickness of the ear and cartilage with an inward facing Burrow's triangle. The wound was then closed in a layered fashion.
Full Thickness Lip Wedge Repair (Flap) Text: Given the location of the defect and the proximity to free margins a full thickness wedge repair was deemed most appropriate.  Using a sterile surgical marker, the appropriate repair was drawn incorporating the defect and placing the expected incisions perpendicular to the vermilion border.  The vermilion border was also meticulously outlined to ensure appropriate reapproximation during the repair.  The area thus outlined was incised through and through with a #15 scalpel blade.  The closure was then performed in layered fashion.  Mucosal first, then muscularis and dermis were reapproximated. Care was taken to realign the vermilion border before proceeding with the superficial closure.
Ftsg Text: The defect edges were debeveled with a #15 scalpel blade.  Given the location, shape, and depth of the defect a full thickness skin graft was deemed most appropriate.  Using a sterile surgical marker, the primary defect shape was transferred to the donor site. The area thus outlined was incised deep to adipose tissue with a #15 scalpel blade.  The harvested graft was then trimmed of adipose tissue until only dermis and epidermis was left.  The skin margins of the secondary defect were undermined to an appropriate distance to minimize tension with closure.  The secondary defect was closed with interrupted buried subcutaneous sutures.  The skin edges were then re-apposed with  sutures.  The skin graft was then placed in the primary defect and sutured into place.
Split-Thickness Skin Graft Text: The defect edges were debeveled with a #15 scalpel blade.  Given the location, shape, and depth of the defect, a split thickness skin graft was deemed most appropriate.  Using a sterile surgical marker, the primary defect shape was transferred to the donor site. The split thickness graft was then harvested.  The skin graft was then placed in the primary defect, oriented appropriately, and sutured into place.
Burow's Graft Text: The defect edges were debeveled with a #15 scalpel blade.  Given the location of the defect, shape of the defect, the proximity to free margins and the presence of a standing cone deformity a Burow's skin graft was deemed most appropriate. The standing cone was removed and this tissue was then trimmed to the shape of the primary defect. The adipose tissue was also removed until only dermis and epidermis were left.  The skin margins of the secondary defect were undermined to an appropriate distance in all directions utilizing iris scissors.  The secondary defect was closed with interrupted buried subcutaneous sutures.  The skin edges were then re-apposed with running  sutures.  The skin graft was then placed in the primary defect and oriented appropriately.
Cartilage Graft Text: The defect edges were debeveled with a #15 scalpel blade.  Given the location, shape, and depth of the defect a cartilage batten graft was deemed necessary to protect the free margin and restore contour.  An appropriate donor site was identified, cleansed, and anesthetized. The overlying skin was incised and lifted.  A cartilage batten graft was then harvested and the overlying skin replaced and sutured closed.  The cartilage graft was then placed into stab pocket incisions in the recipient site and secured with sutures.
Composite Graft Text: The defect edges were debeveled with a #15 scalpel blade.  Given the location of the defect, shape of the defect, the proximity to free margins and the fact the defect was full thickness a composite graft was deemed most appropriate.  The defect was outline and then transferred to the donor site.  A full thickness graft was then excised from the donor site. The graft was then placed in the primary defect, oriented appropriately and then sutured into place.  The secondary defect was then repaired using a primary closure.
Epidermal Autograft Text: The defect edges were debeveled with a #15 scalpel blade.  Given the location of the defect, shape of the defect and the proximity to free margins an epidermal autograft was deemed most appropriate.  Using a sterile surgical marker, the primary defect shape was transferred to the donor site. The epidermal graft was then harvested.  The skin graft was then placed in the primary defect and oriented appropriately.
Dermal Autograft Text: The defect edges were debeveled with a #15 scalpel blade.  Given the location of the defect, shape of the defect and the proximity to free margins a dermal autograft was deemed most appropriate.  Using a sterile surgical marker, the primary defect shape was transferred to the donor site. The area thus outlined was incised deep to adipose tissue with a #15 scalpel blade.  The harvested graft was then trimmed of adipose and epidermal tissue until only dermis was left.  The skin graft was then placed in the primary defect and oriented appropriately.
Skin Substitute Text: The defect edges were debeveled with a #15 scalpel blade.  Given the location of the defect, shape of the defect and the proximity to free margins a skin substitute graft was deemed most appropriate.  The graft material was trimmed to fit the size of the defect. The graft was then placed in the primary defect and oriented appropriately.
Tissue Cultured Epidermal Autograft Text: The defect edges were debeveled with a #15 scalpel blade.  Given the location of the defect, shape of the defect and the proximity to free margins a tissue cultured epidermal autograft was deemed most appropriate.  The graft was then trimmed to fit the size of the defect.  The graft was then placed in the primary defect and oriented appropriately.
Xenograft Text: The defect edges were debeveled with a #15 scalpel blade.  Given the location of the defect, shape of the defect and the proximity to free margins a xenograft was deemed most appropriate.  The graft was then trimmed to fit the size of the defect.  The graft was then placed in the primary defect and oriented appropriately.
Purse String (Simple) Text: Given the location of the defect and the characteristics of the surrounding skin a purse string closure was deemed most appropriate.  Undermining was performed circumfirentially around the surgical defect.  A purse string suture was then placed and tightened.
Purse String (Intermediate) Text: Given the location of the defect and the characteristics of the surrounding skin a purse string intermediate closure was deemed most appropriate.  Undermining was performed circumfirentially around the surgical defect.  A purse string suture was then placed and tightened.
Partial Purse String (Simple) Text: Given the location of the defect and the characteristics of the surrounding skin a simple purse string closure was deemed most appropriate.  Undermining was performed circumfirentially around the surgical defect.  A purse string suture was then placed and tightened. Wound tension only allowed a partial closure of the circular defect.
Partial Purse String (Intermediate) Text: Given the location of the defect and the characteristics of the surrounding skin an intermediate purse string closure was deemed most appropriate.  Undermining was performed circumfirentially around the surgical defect.  A purse string suture was then placed and tightened. Wound tension only allowed a partial closure of the circular defect.
Localized Dermabrasion Text: The patient was draped in routine manner.  Localized dermabrasion using 3 x 17 mm wire brush was performed in routine manner to papillary dermis. This spot dermabrasion is being performed to complete skin cancer reconstruction. It also will eliminate the other sun damaged precancerous cells that are known to be part of the regional effect of a lifetime's worth of sun exposure. This localized dermabrasion is therapeutic and should not be considered cosmetic in any regard.
Tarsorrhaphy Text: A tarsorrhaphy was performed using Frost sutures.
Complex Repair And Flap Additional Text (Will Appearing After The Standard Complex Repair Text): The complex repair was not sufficient to completely close the primary defect. The remaining additional defect was repaired with the flap mentioned below.
Complex Repair And Graft Additional Text (Will Appearing After The Standard Complex Repair Text): The complex repair was not sufficient to completely close the primary defect. The remaining additional defect was repaired with the graft mentioned below.
Unique Flap 1 Name: Bipedicle Advancement Flap
Unique Flap 1 Text: The defect edges were debeveled with a #15 scalpel blade. A complex repair was not sufficient to completely close the primary defect.  Given the location of the defect and the characteristics of the surrounding skin a bipedicle advancement flap was designed.   The flap was incised and then undermined subgaleal to the primary defect.  This allowed closure of the primary defect with less tension.  The secondary defect was then closed in a layered fashion.
Unique Flap 2 Text: The nasalis sling flap was designed to access the more mobile, superior nasal skin.  The medial aspect of the flap was incised through the nasalis muscle to the perichondrium.  Laterally the flap was incised through the subcutaneous layer, preserving the lateral muscular pedicle.  The surrounding skin was undermined in a bilaminar fashion allowing the flap to advance under minimal tension.
Unique Flap 3 Text: A bipedicle advancement flap was designed to reduce tension of the closure and access adjacent tissue laxity.  An incision was made linearly parallel to the primary defect down to the galea.  Undermining was then performed subgaleal connecting the primary and secondary defects.  The standing cones were removed and the primary defect closed in linear fashion.  The secondary defect was then approximated.
Manual Repair Warning Statement: We plan on removing the manually selected variable below in favor of our much easier automatic structured text blocks found in the previous tab. We decided to do this to help make the flow better and give you the full power of structured data. Manual selection is never going to be ideal in our platform and I would encourage you to avoid using manual selection from this point on, especially since I will be sunsetting this feature. It is important that you do one of two things with the customized text below. First, you can save all of the text in a word file so you can have it for future reference. Second, transfer the text to the appropriate area in the Library tab. Lastly, if there is a flap or graft type which we do not have you need to let us know right away so I can add it in before the variable is hidden. No need to panic, we plan to give you roughly 6 months to make the change.
Same Histology In Subsequent Stages Text: The pattern and morphology of the tumor is as described in the first stage.
No Residual Tumor Seen Histology Text: There were no malignant cells seen in the sections examined.
Inflammation Suggestive Of Cancer Camouflage Histology Text: There was a dense lymphocytic infiltrate which prevented adequate histologic evaluation of adjacent structures.
Incidental Superficial Basal Cell Carcinoma Histology Text: Atypical basaloid epithelial cells extending from the epidermis with peritumoral clefting.
Incidental Squamous Cell Carcinoma In Situ Histology Text: Full-thickness epidermal atypia with disruption of the dermal-epidermal junction.
Incidental Actinic Keratosis Histology Text: Focal basilar crowding and atypia of keratinocytes.
Bcc Histology Text: There were aggregates of atypical basaloid epithelial cells.
Bcc Infiltrative Histology Text: There were aggregates of basaloid cells demonstrating an infiltrative pattern.
Bcc Infundibulocystic Histology Text: Atypical basaloid tumor aggregates identified.
Mixed Superficial And Nodular Bcc Histology Text: Atypical basaloid tumor aggregates extending from the epidermis and in the dermis.
Scc Histology Text: Pink keratinocytic tumor cells.
Scc In Situ Histology Text: Full thickness epidermal atypia with disruption of the dermal epidermal junction.
Mart-1 - Positive Histology Text: MART-1 staining demonstrates areas of higher density and clustering of melanocytes with Pagetoid spread upwards within the epidermis. The surgical margins are positive for tumor cells.
Mart-1 - Negative Histology Text: MART-1 staining demonstrates a normal density and pattern of melanocytes along the dermal-epidermal junction. The surgical margins are negative for tumor cells.
Information: Selecting Yes will display possible errors in your note based on the variables you have selected. This validation is only offered as a suggestion for you. PLEASE NOTE THAT THE VALIDATION TEXT WILL BE REMOVED WHEN YOU FINALIZE YOUR NOTE. IF YOU WANT TO FAX A PRELIMINARY NOTE YOU WILL NEED TO TOGGLE THIS TO 'NO' IF YOU DO NOT WANT IT IN YOUR FAXED NOTE.

## 2022-12-30 ENCOUNTER — APPOINTMENT (RX ONLY)
Dept: URBAN - METROPOLITAN AREA CLINIC 165 | Facility: CLINIC | Age: 82
Setting detail: DERMATOLOGY
End: 2022-12-30

## 2022-12-30 DIAGNOSIS — Z48.02 ENCOUNTER FOR REMOVAL OF SUTURES: ICD-10-CM

## 2022-12-30 PROCEDURE — ? SUTURE REMOVAL (GLOBAL PERIOD)

## 2022-12-30 ASSESSMENT — LOCATION ZONE DERM: LOCATION ZONE: NOSE

## 2022-12-30 ASSESSMENT — LOCATION SIMPLE DESCRIPTION DERM: LOCATION SIMPLE: NOSE

## 2022-12-30 ASSESSMENT — LOCATION DETAILED DESCRIPTION DERM: LOCATION DETAILED: LEFT NASAL ROOT

## 2022-12-30 NOTE — PROCEDURE: SUTURE REMOVAL (GLOBAL PERIOD)
Detail Level: Detailed
Add 55800 Cpt? (Important Note: In 2017 The Use Of 61914 Is Being Tracked By Cms To Determine Future Global Period Reimbursement For Global Periods): no

## 2023-01-05 ENCOUNTER — APPOINTMENT (RX ONLY)
Dept: URBAN - METROPOLITAN AREA CLINIC 158 | Facility: CLINIC | Age: 83
Setting detail: DERMATOLOGY
End: 2023-01-05

## 2023-01-05 DIAGNOSIS — L57.0 ACTINIC KERATOSIS: ICD-10-CM

## 2023-01-05 DIAGNOSIS — Z85.828 PERSONAL HISTORY OF OTHER MALIGNANT NEOPLASM OF SKIN: ICD-10-CM

## 2023-01-05 PROCEDURE — ? TREATMENT REGIMEN

## 2023-01-05 PROCEDURE — ? OBSERVATION

## 2023-01-05 PROCEDURE — 17000 DESTRUCT PREMALG LESION: CPT | Mod: 79

## 2023-01-05 PROCEDURE — ? COUNSELING

## 2023-01-05 PROCEDURE — 17003 DESTRUCT PREMALG LES 2-14: CPT | Mod: 79

## 2023-01-05 PROCEDURE — 99212 OFFICE O/P EST SF 10 MIN: CPT | Mod: 24,25

## 2023-01-05 PROCEDURE — ? LIQUID NITROGEN

## 2023-01-05 ASSESSMENT — LOCATION SIMPLE DESCRIPTION DERM
LOCATION SIMPLE: RIGHT EAR
LOCATION SIMPLE: SCALP
LOCATION SIMPLE: NOSE

## 2023-01-05 ASSESSMENT — LOCATION DETAILED DESCRIPTION DERM
LOCATION DETAILED: RIGHT SUPERIOR POSTAURICULAR SKIN
LOCATION DETAILED: RIGHT INFERIOR HELIX
LOCATION DETAILED: LEFT NASAL ROOT

## 2023-01-05 ASSESSMENT — LOCATION ZONE DERM
LOCATION ZONE: SCALP
LOCATION ZONE: EAR
LOCATION ZONE: NOSE

## 2023-01-05 NOTE — PROCEDURE: LIQUID NITROGEN
Render Note In Bullet Format When Appropriate: No
Application Tool (Optional): Cotton Tipped Applicator
Show Aperture Variable?: Yes
Consent: The patient's consent was obtained including but not limited to risks of crusting, scabbing, blistering, scarring, darker or lighter pigmentary change, recurrence, incomplete removal and infection.
Number Of Freeze-Thaw Cycles: 1 freeze-thaw cycle
Detail Level: Detailed
Post-Care Instructions: I reviewed with the patient in detail post-care instructions. Patient is to wear sunprotection. Pt may apply Vaseline to crusted or scabbing areas.
Duration Of Freeze Thaw-Cycle (Seconds): 0

## 2023-01-05 NOTE — PROCEDURE: OBSERVATION
Detail Level: Detailed
Size Of Lesion In Cm (Optional): 0
Morphology Per Location (Optional): [facility] Dr. Johnosn 2022

## 2023-01-05 NOTE — PROCEDURE: TREATMENT REGIMEN
Plan[facility] and will f/u at [facility] in late 2023 or early 2024
Detail Level: Detailed

## 2023-02-13 DIAGNOSIS — E78.00 HYPERCHOLESTEROLEMIA: ICD-10-CM

## 2023-02-15 ENCOUNTER — APPOINTMENT (RX ONLY)
Dept: URBAN - METROPOLITAN AREA CLINIC 165 | Facility: CLINIC | Age: 83
Setting detail: DERMATOLOGY
End: 2023-02-15

## 2023-02-15 DIAGNOSIS — Z48.817 ENCOUNTER FOR SURGICAL AFTERCARE FOLLOWING SURGERY ON THE SKIN AND SUBCUTANEOUS TISSUE: ICD-10-CM

## 2023-02-15 PROCEDURE — ? TREATMENT REGIMEN

## 2023-02-15 PROCEDURE — ? POST-OP WOUND CHECK

## 2023-02-15 PROCEDURE — ? INTRALESIONAL KENALOG

## 2023-02-15 RX ORDER — ATORVASTATIN CALCIUM 40 MG/1
TABLET, FILM COATED ORAL
Qty: 90 TABLET | Refills: 0 | Status: SHIPPED | OUTPATIENT
Start: 2023-02-15 | End: 2023-05-22

## 2023-02-15 ASSESSMENT — LOCATION DETAILED DESCRIPTION DERM: LOCATION DETAILED: LEFT NASAL ROOT

## 2023-02-15 ASSESSMENT — LOCATION SIMPLE DESCRIPTION DERM: LOCATION SIMPLE: NOSE

## 2023-02-15 ASSESSMENT — LOCATION ZONE DERM: LOCATION ZONE: NOSE

## 2023-02-15 NOTE — PROCEDURE: POST-OP WOUND CHECK
Detail Level: Detailed
Add 29636 Cpt? (Important Note: In 2017 The Use Of 79803 Is Being Tracked By Cms To Determine Future Global Period Reimbursement For Global Periods): no
Wound Evaluated By: Dr. Johnson

## 2023-02-15 NOTE — PROCEDURE: INTRALESIONAL KENALOG
How Many Mls Were Removed From The 40 Mg/Ml (5ml) Vial When Preparing The Injectable Solution?: 0
Administered By (Optional): Dr. Johnson
Include Z78.9 (Other Specified Conditions Influencing Health Status) As An Associated Diagnosis?: No
Expiration Date (Optional): February 2024
Consent: The risks of atrophy were reviewed with the patient.\\n\\nNo charge
Concentration Of Solution Injected (Mg/Ml): 40.0
Kenalog Preparation: Kenalog
Total Volume Injected (Ccs- Only Use Numbers And Decimals): 0.15
Lot # (Optional): [id number]
Medical Necessity Clause: This procedure was medically necessary because the lesions that were treated were:
Ndc# For Kenalog Only: 4367-9337-16
Detail Level: Detailed
Treatment Number (Optional): 1
Validate Note Data When Using Inventory: Yes

## 2023-02-15 NOTE — TELEPHONE ENCOUNTER
"Last Written Prescription Date:  2/8/22  Last Fill Quantity: 90,  # refills: 3   Last office visit provider:  9/8/22     Requested Prescriptions   Pending Prescriptions Disp Refills     atorvastatin (LIPITOR) 40 MG tablet 90 tablet 3     Sig: [ATORVASTATIN (LIPITOR) 40 MG TABLET] TAKE 1 TABLET BY MOUTH AT BEDTIME       Statins Protocol Passed - 2/13/2023  2:20 PM        Passed - LDL on file in past 12 months     Recent Labs   Lab Test 06/08/22  0842   LDL 55             Passed - No abnormal creatine kinase in past 12 months     Recent Labs   Lab Test 08/19/21  0837   CKT 75                Passed - Recent (12 mo) or future (30 days) visit within the authorizing provider's specialty     Patient has had an office visit with the authorizing provider or a provider within the authorizing providers department within the previous 12 mos or has a future within next 30 days. See \"Patient Info\" tab in inbasket, or \"Choose Columns\" in Meds & Orders section of the refill encounter.              Passed - Medication is active on med list        Passed - Patient is age 18 or older             Luisa Oswald, RN 02/15/23 10:55 AM  "

## 2023-04-21 ENCOUNTER — TELEPHONE (OUTPATIENT)
Dept: INTERNAL MEDICINE | Facility: CLINIC | Age: 83
End: 2023-04-21
Payer: MEDICARE

## 2023-04-21 NOTE — TELEPHONE ENCOUNTER
I received paperwork to reserve an apartment home at [facility], the patient has not been seen since June 2023.  All questions could not be answered because of that.  If patient needs this form filled out in more detail, he would need appointment in clinic.  I put the partially completed form in the out basket.

## 2023-04-21 NOTE — TELEPHONE ENCOUNTER
LVM for pt explaining and asking pt to call us back and get a visit scheduled with Dr Ma to complete this paperwork.    *Paperwork is below Pooja's desk in FAX basket.*

## 2023-04-26 DIAGNOSIS — I25.10 CORONARY ARTERY DISEASE DUE TO CALCIFIED CORONARY LESION: ICD-10-CM

## 2023-04-26 DIAGNOSIS — I25.84 CORONARY ARTERY DISEASE DUE TO CALCIFIED CORONARY LESION: ICD-10-CM

## 2023-04-27 RX ORDER — NITROGLYCERIN 0.4 MG/1
TABLET SUBLINGUAL
Qty: 25 TABLET | Refills: 2 | Status: SHIPPED | OUTPATIENT
Start: 2023-04-27 | End: 2023-05-05

## 2023-04-27 NOTE — TELEPHONE ENCOUNTER
"Routing refill request to provider for review/approval because:  Labs out of range:  BP  Please review    Last Written Prescription Date:  7/11/2022  Last Fill Quantity: 25,  # refills: 2   Last office visit provider:  9/8/2022     Requested Prescriptions   Pending Prescriptions Disp Refills     nitroGLYcerin (NITROSTAT) 0.4 MG sublingual tablet 25 tablet 2     Sig: [NITROGLYCERIN (NITROSTAT) 0.4 MG SL TABLET] DISSOLVE ONE TABLET UNDER THE TONGUE EVERY 5 MINUTES AS NEEDED FOR CHEST PAIN.  DO NOT EXCEED A TOTAL OF 3 DOSES IN 15 MINUTES       Nitrates Failed - 4/26/2023 10:28 AM        Failed - Blood pressure under 140/90 in past 12 months     BP Readings from Last 3 Encounters:   09/16/22 (!) 179/78   09/08/22 (!) 158/68   08/16/22 138/68                 Failed - Sublingual nitro order needs review     If refill exceeds 1 bottle per month, please forward request to provider.           Passed - Pt is not on erectile dysfunction medications        Passed - Recent (12 mo) or future (30 days) visit within the authorizing provider's specialty     Patient has had an office visit with the authorizing provider or a provider within the authorizing providers department within the previous 12 mos or has a future within next 30 days. See \"Patient Info\" tab in inbasket, or \"Choose Columns\" in Meds & Orders section of the refill encounter.              Passed - Medication is active on med list        Passed - Patient is age 18 or older             Sheila Bee RN 04/27/23 3:01 AM  "

## 2023-05-05 ENCOUNTER — OFFICE VISIT (OUTPATIENT)
Dept: INTERNAL MEDICINE | Facility: CLINIC | Age: 83
End: 2023-05-05
Payer: MEDICARE

## 2023-05-05 VITALS
OXYGEN SATURATION: 96 % | WEIGHT: 241.2 LBS | DIASTOLIC BLOOD PRESSURE: 80 MMHG | TEMPERATURE: 97.9 F | RESPIRATION RATE: 20 BRPM | BODY MASS INDEX: 33.64 KG/M2 | SYSTOLIC BLOOD PRESSURE: 140 MMHG | HEART RATE: 71 BPM

## 2023-05-05 DIAGNOSIS — I10 ESSENTIAL HYPERTENSION: ICD-10-CM

## 2023-05-05 DIAGNOSIS — E78.00 HYPERCHOLESTEROLEMIA: ICD-10-CM

## 2023-05-05 DIAGNOSIS — Z51.81 ENCOUNTER FOR THERAPEUTIC DRUG MONITORING: ICD-10-CM

## 2023-05-05 DIAGNOSIS — I25.10 CORONARY ARTERY DISEASE DUE TO CALCIFIED CORONARY LESION: ICD-10-CM

## 2023-05-05 DIAGNOSIS — Z87.39 HISTORY OF GOUT: ICD-10-CM

## 2023-05-05 DIAGNOSIS — E11.69 TYPE 2 DIABETES MELLITUS WITH OTHER SPECIFIED COMPLICATION, WITHOUT LONG-TERM CURRENT USE OF INSULIN (H): Primary | ICD-10-CM

## 2023-05-05 DIAGNOSIS — N18.30 CHRONIC RENAL INSUFFICIENCY, STAGE 3 (MODERATE) (H): ICD-10-CM

## 2023-05-05 DIAGNOSIS — G47.19 EXCESSIVE DAYTIME SLEEPINESS: ICD-10-CM

## 2023-05-05 DIAGNOSIS — I25.84 CORONARY ARTERY DISEASE DUE TO CALCIFIED CORONARY LESION: ICD-10-CM

## 2023-05-05 DIAGNOSIS — I65.22 ASYMPTOMATIC CAROTID ARTERY STENOSIS, LEFT: ICD-10-CM

## 2023-05-05 LAB
ALBUMIN SERPL BCG-MCNC: 4.2 G/DL (ref 3.5–5.2)
ALP SERPL-CCNC: 72 U/L (ref 40–129)
ALT SERPL W P-5'-P-CCNC: 50 U/L (ref 10–50)
ANION GAP SERPL CALCULATED.3IONS-SCNC: 12 MMOL/L (ref 7–15)
AST SERPL W P-5'-P-CCNC: 37 U/L (ref 10–50)
BILIRUB SERPL-MCNC: 0.4 MG/DL
BUN SERPL-MCNC: 37 MG/DL (ref 8–23)
CALCIUM SERPL-MCNC: 9.7 MG/DL (ref 8.8–10.2)
CHLORIDE SERPL-SCNC: 103 MMOL/L (ref 98–107)
CREAT SERPL-MCNC: 1.83 MG/DL (ref 0.67–1.17)
DEPRECATED HCO3 PLAS-SCNC: 25 MMOL/L (ref 22–29)
GFR SERPL CREATININE-BSD FRML MDRD: 36 ML/MIN/1.73M2
GLUCOSE SERPL-MCNC: 291 MG/DL (ref 70–99)
HBA1C MFR BLD: 8.9 % (ref 0–5.6)
LDLC SERPL DIRECT ASSAY-MCNC: 64 MG/DL
POTASSIUM SERPL-SCNC: 5 MMOL/L (ref 3.4–5.3)
PROT SERPL-MCNC: 7.1 G/DL (ref 6.4–8.3)
SODIUM SERPL-SCNC: 140 MMOL/L (ref 136–145)
URATE SERPL-MCNC: 5.6 MG/DL (ref 3.4–7)

## 2023-05-05 PROCEDURE — 83721 ASSAY OF BLOOD LIPOPROTEIN: CPT | Performed by: INTERNAL MEDICINE

## 2023-05-05 PROCEDURE — 36415 COLL VENOUS BLD VENIPUNCTURE: CPT | Performed by: INTERNAL MEDICINE

## 2023-05-05 PROCEDURE — 80053 COMPREHEN METABOLIC PANEL: CPT | Performed by: INTERNAL MEDICINE

## 2023-05-05 PROCEDURE — 99214 OFFICE O/P EST MOD 30 MIN: CPT | Performed by: INTERNAL MEDICINE

## 2023-05-05 PROCEDURE — 84550 ASSAY OF BLOOD/URIC ACID: CPT | Performed by: INTERNAL MEDICINE

## 2023-05-05 PROCEDURE — 83036 HEMOGLOBIN GLYCOSYLATED A1C: CPT | Performed by: INTERNAL MEDICINE

## 2023-05-05 RX ORDER — CARVEDILOL 3.12 MG/1
1 TABLET ORAL
COMMUNITY
Start: 2023-04-05

## 2023-05-05 RX ORDER — FUROSEMIDE 20 MG
TABLET ORAL
Qty: 30 TABLET | Refills: 0 | Status: SHIPPED | OUTPATIENT
Start: 2023-05-05

## 2023-05-05 RX ORDER — NITROGLYCERIN 0.4 MG/1
TABLET SUBLINGUAL
Qty: 25 TABLET | Refills: 2
Start: 2023-05-05 | End: 2023-11-27

## 2023-05-05 NOTE — PATIENT INSTRUCTIONS
Continue on current medications.    If your systolic blood pressure is above 160, you can take the furosemide 20 mg pill that day.    But if you maintain regular exercise, avoid high salt foods and processed foods, and reduce alcohol, your blood pressure will likely improve and you may not need the furosemide.    You can continue on the current carvedilol.    If your blood sugars/hemoglobin A1c level is still significantly high, I will plan to increase your Ozempic.    See me in clinic for follow-up sooner if able later this month.    Make an appointment with the sleep clinic.  I suspect you have sleep apnea which can contribute to your neuropathy and blood pressure issue.

## 2023-05-05 NOTE — PROGRESS NOTES
Yoahn Hayward   82 year old male    Date of Visit: 5/5/2023    Chief Complaint   Patient presents with     Forms     Hypertension     Subjective  Yohan is here for follow-up after spending the winter in [place].    He has a past history of diabetes type 2, moderate chronic renal sufficiency with a creatinine last September 1.9, history of hypertension.    Patient has a history of severe peripheral neuropathy, with likely autonomic neuropathy and highly fluctuating blood pressures.  He is still getting low blood pressures in the morning and higher blood pressures in the evening.    Has been eating better, drinking some protein shakes regularly.  He does have a history of chronic alcohol use, he did cut down last year but is drinking alcohol at this time.    Suspected sleep apnea and the wife does feel that he stops breathing in the middle of the night.  He has had some chronic moderate daytime sleepiness.  He has not had sleep evaluation yet.    He has a history of hypertensive encephalopathy with lacunar strokes.    He is obese.    Diabetes type 2 is poorly controlled with blood sugars in the 1  range.  Often above 160.  No polyuria or polydipsia.  He denies any low blood sugars.  On glimepiride 4 mg in the morning and Ozempic 0.5 mg weekly.  He denies any significant nausea or GI upset with the Ozempic.    No history of pancreatitis.    June 2022 heart echo was normal, no aortic stenosis.    He does have some occasional exertional chest pain with walking up the hill but that is a stable angina pattern for him.  He occasionally takes nitroglycerin with benefit.  No rest pain.  No escalating exertional angina.    He is on aspirin daily without epigastric pain or bleeding.  On Lipitor 40 mg without complaints of generalized myalgias today.  His liver tests were normal last August.    Hemoglobin A1c level was 8.6% last September.    August 2022 carotid ultrasound 50-69% the left and just mild plaque  on the right.    No recent gout.  On Uloric.    He has had a chronic cough.  He was treated for sinusitis earlier this spring but unclear if that helped.  He states he gets nosebleeds on Flonase.  He is not having fevers or purulent discharge or increasing shortness of breath.    Patient's blood pressure had been running higher in [place] and he did restart the carvedilol 3.125 mg twice a day last year.      PMHx:  No past medical history on file.  PSHx:    Past Surgical History:   Procedure Laterality Date     ESOPHAGOSCOPY, GASTROSCOPY, DUODENOSCOPY (EGD), COMBINED  05/05/2011     Immunizations:   Immunization History   Administered Date(s) Administered     COVID-19 MONOVALENT 12+ (Pfizer) 01/30/2021, 02/23/2021     FLU 6-35 months 10/10/2009     Flu 65+ Years 10/13/2017     Influenza (High Dose) 3 valent vaccine 09/14/2015, 10/27/2016, 09/04/2018, 10/08/2019     Influenza (IIV3) PF 11/10/2011     Influenza Vaccine 65+ (Fluzone HD) 10/08/2020, 09/08/2022     Influenza, Whole Virus 10/16/2013     Pneumo Conj 13-V (2010&after) 07/23/2015     Pneumococcal 23 valent 10/31/2012     TDAP (Adacel,Boostrix) 10/31/2012       ROS A comprehensive review of systems was performed and was otherwise negative    Medications, allergies, and problem list were reviewed and updated    Exam  BP (!) 140/80   Pulse 71   Temp 97.9  F (36.6  C) (Oral)   Resp 20   Wt 109.4 kg (241 lb 3.2 oz)   SpO2 96%   BMI 33.64 kg/m    Moderately overweight male.  Alert and oriented.  No jaundice.  Lungs are clear.  Heart is regular without murmur.  Abdomen is nontender and no ankle edema.    Blood pressure was initially 203/84, but came down on my recheck.  History of highly fluctuating blood pressures.    Assessment/Plan  1. Type 2 diabetes mellitus with other specified complication, without long-term current use of insulin (H)  Does not appear to be controlled.  Check hemoglobin A1c today.  I will plan to discuss increasing Ozempic at next  visit later this month.  Continue the Amaryl 4 mg a day as long as no low blood sugars.    Not on metformin with his chronic renal insufficiency.    Severe peripheral neuropathy likely associated with his alcohol and I suspect sleep apnea.  - Hemoglobin A1c    We will need to determine when he is due for his diabetic eye exam.    2. Essential hypertension  Highly fluctuating blood pressures.  History of high blood pressures in the evening and low blood pressures in the morning.  Likely autonomic neuropathy.    High spiking blood pressure today may have been stressed but he did just come back from [place] last week, has not been eating well.    I asked him to eat a low-salt diet, avoid processed foods.  Increase regular walking exercise.    If he does have high spiking blood pressures above 160 systolic he can take the furosemide 1 pill at that time.  He was warned about potential precipitation of gout with this medication.  - furosemide (LASIX) 20 MG tablet; Take 1 pill once that day if your blood pressure is above 160 systolic.  Dispense: 30 tablet; Refill: 0    Continue carvedilol 3.125 mg twice daily.    3. Chronic renal insufficiency, stage 3 (moderate) (H)  If needed for blood pressure above 130.  - furosemide (LASIX) 20 MG tablet; Take 1 pill once that day if your blood pressure is above 160 systolic.  Dispense: 30 tablet; Refill: 0    4. Coronary artery disease due to calcified coronary lesion  He does have a stable angina pattern, denies escalation.  Continue aspirin and Lipitor 40 mg.    Patient was told to seek medical attention immediately if pain at rest, or if pain does not resolve within 15 minutes or if escalating episodes of chest pain.  - nitroGLYcerin (NITROSTAT) 0.4 MG sublingual tablet; [NITROGLYCERIN (NITROSTAT) 0.4 MG SL TABLET] DISSOLVE ONE TABLET UNDER THE TONGUE EVERY 5 MINUTES AS NEEDED FOR CHEST PAIN.  DO NOT EXCEED A TOTAL OF 3 DOSES IN 15 MINUTES  Dispense: 25 tablet; Refill: 2    5.  Asymptomatic carotid artery stenosis, left  Asymptomatic.  Medical management as above    6. Encounter for therapeutic drug monitoring    - Comprehensive metabolic panel    7. History of gout  No recent gout, continue Uloric  - Uric acid    8. Hypercholesterolemia  As above, goal LDL less than 80, but not planning higher dose of atorvastatin with age  - LDL cholesterol direct    9. Excessive daytime sleepiness  High suspicion for sleep apnea.  Uses chronic alcohol, obese with chronic daytime fatigue, history of hypertensive encephalopathy with lacunar strokes.    Patient was counseled to reduce or stop alcohol.  - Adult Sleep Eval & Management  Referral; Future    Paperwork filled out for his assisted living in [place].      Return in about 19 days (around 5/24/2023) for Diabetes and blood pressure follow-up appointment.   Patient Instructions   Continue on current medications.    If your systolic blood pressure is above 160, you can take the furosemide 20 mg pill that day.    But if you maintain regular exercise, avoid high salt foods and processed foods, and reduce alcohol, your blood pressure will likely improve and you may not need the furosemide.    You can continue on the current carvedilol.    If your blood sugars/hemoglobin A1c level is still significantly high, I will plan to increase your Ozempic.    See me in clinic for follow-up sooner if able later this month.    Make an appointment with the sleep clinic.  I suspect you have sleep apnea which can contribute to your neuropathy and blood pressure issue.    Dimitri Ma MD, MD        Current Outpatient Medications   Medication Sig Dispense Refill     aspirin 325 MG tablet Take 325 mg by mouth daily       atorvastatin (LIPITOR) 40 MG tablet [ATORVASTATIN (LIPITOR) 40 MG TABLET] TAKE 1 TABLET BY MOUTH AT BEDTIME 90 tablet 0     carvedilol (COREG) 3.125 MG tablet Take 1 tablet by mouth 2 times daily       febuxostat (ULORIC) 40 MG TABS tablet Take  1 tablet (40 mg) by mouth daily 90 tablet 3     furosemide (LASIX) 20 MG tablet Take 1 pill once that day if your blood pressure is above 160 systolic. 30 tablet 0     glimepiride (AMARYL) 2 MG tablet [GLIMEPIRIDE (AMARYL) 2 MG TABLET] Take 2 tablets (4 mg) by mouth every morning with meal. 180 tablet 3     multivitamin, therapeutic (THERA-VIT) TABS tablet Take 1 tablet by mouth daily       nitroGLYcerin (NITROSTAT) 0.4 MG sublingual tablet [NITROGLYCERIN (NITROSTAT) 0.4 MG SL TABLET] DISSOLVE ONE TABLET UNDER THE TONGUE EVERY 5 MINUTES AS NEEDED FOR CHEST PAIN.  DO NOT EXCEED A TOTAL OF 3 DOSES IN 15 MINUTES 25 tablet 2     semaglutide (OZEMPIC, 0.25 OR 0.5 MG/DOSE,) 2 MG/1.5ML SOPN pen Inject 0.5 mg Subcutaneous once a week 4.5 mL 4     blood glucose (NO BRAND SPECIFIED) test strip Use to test blood sugar 1 time daily or as directed. 100 strip 3     Allergies   Allergen Reactions     Allopurinol Other (See Comments)     Other reaction(s): Chest Tightness, Gave pt pains in chest like a heart attack, Pain     Social History     Tobacco Use     Smoking status: Former     Packs/day: 1.50     Years: 15.00     Pack years: 22.50     Types: Cigarettes     Quit date: 1981     Years since quittin.3     Smokeless tobacco: Never   Vaping Use     Vaping status: Never Used     Passive vaping exposure: Yes   Substance Use Topics     Alcohol use: Yes     Alcohol/week: 14.0 standard drinks of alcohol             Grey Almanzar is a 82 year old, presenting for the following health issues:  Forms and Hypertension        2023    11:00 AM   Additional Questions   Roomed by JUDSON SINHA   Accompanied by wife     MAGDALENA     Hypertension Follow-up      Do you check your blood pressure regularly outside of the clinic? No     Are you following a low salt diet? No    Are your blood pressures ever more than 140 on the top number (systolic) OR more   than 90 on the bottom number (diastolic), for example 140/90? Yes          Review  of Systems         Objective    BP (!) 203/84 (BP Location: Right arm, Patient Position: Sitting, Cuff Size: Adult Regular)   Pulse 71   Temp 97.9  F (36.6  C) (Oral)   Resp 20   Wt 109.4 kg (241 lb 3.2 oz)   SpO2 96%   BMI 33.64 kg/m    Body mass index is 33.64 kg/m .  Physical Exam

## 2023-05-22 DIAGNOSIS — E11.9 TYPE 2 DIABETES MELLITUS WITHOUT COMPLICATION, WITHOUT LONG-TERM CURRENT USE OF INSULIN (H): ICD-10-CM

## 2023-05-22 DIAGNOSIS — E78.00 HYPERCHOLESTEROLEMIA: ICD-10-CM

## 2023-05-22 RX ORDER — ATORVASTATIN CALCIUM 40 MG/1
TABLET, FILM COATED ORAL
Qty: 90 TABLET | Refills: 3 | Status: SHIPPED | OUTPATIENT
Start: 2023-05-22

## 2023-05-23 ENCOUNTER — OFFICE VISIT (OUTPATIENT)
Dept: INTERNAL MEDICINE | Facility: CLINIC | Age: 83
End: 2023-05-23
Payer: MEDICARE

## 2023-05-23 VITALS
OXYGEN SATURATION: 98 % | TEMPERATURE: 97.9 F | HEART RATE: 70 BPM | HEIGHT: 71 IN | RESPIRATION RATE: 20 BRPM | WEIGHT: 237 LBS | BODY MASS INDEX: 33.18 KG/M2

## 2023-05-23 DIAGNOSIS — N18.30 CHRONIC RENAL INSUFFICIENCY, STAGE 3 (MODERATE) (H): ICD-10-CM

## 2023-05-23 DIAGNOSIS — I10 ESSENTIAL HYPERTENSION: ICD-10-CM

## 2023-05-23 DIAGNOSIS — I25.84 CORONARY ARTERY DISEASE DUE TO CALCIFIED CORONARY LESION: ICD-10-CM

## 2023-05-23 DIAGNOSIS — I25.10 CORONARY ARTERY DISEASE DUE TO CALCIFIED CORONARY LESION: ICD-10-CM

## 2023-05-23 DIAGNOSIS — G62.9 PERIPHERAL POLYNEUROPATHY: ICD-10-CM

## 2023-05-23 DIAGNOSIS — I65.22 ASYMPTOMATIC CAROTID ARTERY STENOSIS, LEFT: ICD-10-CM

## 2023-05-23 DIAGNOSIS — E11.69 TYPE 2 DIABETES MELLITUS WITH OTHER SPECIFIED COMPLICATION, WITHOUT LONG-TERM CURRENT USE OF INSULIN (H): Primary | ICD-10-CM

## 2023-05-23 PROCEDURE — 99214 OFFICE O/P EST MOD 30 MIN: CPT | Performed by: INTERNAL MEDICINE

## 2023-05-23 NOTE — PROGRESS NOTES
Yohan Hayward   82 year old male    Date of Visit: 5/23/2023    Chief Complaint   Patient presents with     Follow Up     DM, BP check     Subjective  82-year-old male here for follow-up on diabetes.    Obesity with suspected sleep apnea but he does not have evaluation till next month.    History of hypertensive encephalopathy and a lacunar stroke back in 2014.  He has severe peripheral neuropathy, even some autonomic neuropathy with fluctuating blood pressures.    He has a history of chronic alcohol, states he is now stopped alcohol completely, drinking protein shakes and eating a better diet over the past month.    2 months ago his blood sugars are running up in the 160s.    Now over the last few weeks his blood sugars have been in the low 100s from 102-147.    Denies any low blood sugar events.  He is on glimepiride 4 mg in the morning.  Ozempic 0.5 mg weekly.  No nausea or stomach upset or diarrhea.    Hemoglobin A1c level earlier this month was higher at 8.9%.    He is trying to walk more and be more active.  No foot sores.  He has a history of coronary disease with stable exertional angina class II-3 but denies recurrent chest pain.  Still on aspirin and Lipitor.    His labs from earlier this month showed an LDL of 64.    He has been over a year since he seen the eye doctor but denies new vision changes.    August 2022 carotid ultrasound was 50-69% in the left and mild on the right.    No recurrent gout with a low uric acid level earlier this month.    June 2022 normal heart echo.    He does have moderate chronic renal sufficiency with a creatinine stable at 1.8 earlier this month.  His blood pressures have been around 140/70 on his checks.  He occasionally gets higher blood pressures in the evening.  He has had problems with that in the past with low blood pressures in the morning and high blood pressures in the evening.  That has improved with stopping alcohol.    PMHx:  No past medical history on  "file.  PSHx:    Past Surgical History:   Procedure Laterality Date     ESOPHAGOSCOPY, GASTROSCOPY, DUODENOSCOPY (EGD), COMBINED  05/05/2011     Immunizations:   Immunization History   Administered Date(s) Administered     COVID-19 MONOVALENT 12+ (Pfizer) 01/30/2021, 02/23/2021     FLU 6-35 months 10/10/2009     Flu 65+ Years 10/13/2017     Influenza (High Dose) 3 valent vaccine 09/14/2015, 10/27/2016, 09/04/2018, 10/08/2019     Influenza (IIV3) PF 11/10/2011     Influenza Vaccine 65+ (Fluzone HD) 10/08/2020, 09/08/2022     Influenza, Whole Virus 10/16/2013     Pneumo Conj 13-V (2010&after) 07/23/2015     Pneumococcal 23 valent 10/31/2012     TDAP (Adacel,Boostrix) 10/31/2012       ROS A comprehensive review of systems was performed and was otherwise negative    Medications, allergies, and problem list were reviewed and updated    Exam  Pulse 70   Temp 97.9  F (36.6  C)   Resp 20   Ht 1.803 m (5' 11\")   Wt 107.5 kg (237 lb)   SpO2 98%   BMI 33.05 kg/m    Lungs are clear.  Heart is regular without murmur.  There is no ankle edema.  Abdomen nontender    Assessment/Plan  1. Type 2 diabetes mellitus with other specified complication, without long-term current use of insulin (H)  Improved control recently but hemoglobin A1c level was still quite high.    Improving diet and stopping alcohol likely helped considerably.    Encouraged him to continue to abstain from alcohol and maintain his better diabetic diet.    He does want to increase the Ozempic.  I did discuss risk of gastroparesis and nausea and pancreatitis symptoms with that.    See patient instructions.  Increase to 1 mg and follow-up next month for his physical  - Semaglutide, 1 MG/DOSE, (OZEMPIC) 4 MG/3ML pen; Inject 1 mg Subcutaneous every 7 days  Dispense: 9 mL; Refill: 1    No metformin with his chronic renal sufficiency    He was reminded to make an ophthalmology diabetic eye exam which she is due for now.    2. Essential hypertension  Adequate control. "  Fluctuating blood pressures consistent with autonomic neuropathy.  Also sleep apnea likely contributing, has evaluation for sleep apnea next month.    Continue the carvedilol 3.125 mg twice daily.  He does have furosemide 20 mg as needed for systolic blood pressure above 160    3. Chronic renal insufficiency, stage 3 (moderate) (H)  Stable on check earlier this month    4. Peripheral polyneuropathy  Stable.  Severe.  Treat sleep apnea and stopped alcohol    5. Coronary artery disease due to calcified coronary lesion  Asymptomatic currently.  History of exertional angina.  Nitroglycerin as needed.  Aspirin and Lipitor.  Cholesterol was well controlled earlier this month, he does not need to fast for his physical next month    6. Asymptomatic carotid artery stenosis, left  Asymptomatic and same treatment plan as above    History of gout without recurrence on Uloric with low uric acid level earlier this month.    He again refuses COVID vaccines.    Obesity with daytime fatigue.  I suspect sleep apnea and he has an appointment next month.  He has stopped alcohol recently and has improved sleep some.      Return in 23 days (on 6/15/2023) for Adult wellness visit.   Patient Instructions   Increase Ozempic to 1 mg weekly.  If any low blood sugars less than 80, stop the glimepiride.  Continue to eat small meals and healthy diabetic diet.  Contact me if you have significant worsening abdominal pain or nausea.    Proceed with sleep apnea evaluation next month as planned.    I will see you next month for your adult wellness visit physical exam, I will also be a follow-up on your diabetes.  You do not need to fast.    Make an appointment with your ophthalmologist for diabetic eye exam.    Dimitri Ma MD, MD        Current Outpatient Medications   Medication Sig Dispense Refill     aspirin 325 MG tablet Take 325 mg by mouth daily       atorvastatin (LIPITOR) 40 MG tablet [ATORVASTATIN (LIPITOR) 40 MG TABLET] TAKE 1 TABLET BY  MOUTH AT BEDTIME 90 tablet 3     carvedilol (COREG) 3.125 MG tablet Take 1 tablet by mouth 2 times daily       febuxostat (ULORIC) 40 MG TABS tablet Take 1 tablet (40 mg) by mouth daily (Patient taking differently: Take 40 mg by mouth every other day) 90 tablet 3     furosemide (LASIX) 20 MG tablet Take 1 pill once that day if your blood pressure is above 160 systolic. 30 tablet 0     glimepiride (AMARYL) 2 MG tablet [GLIMEPIRIDE (AMARYL) 2 MG TABLET] Take 2 tablets (4 mg) by mouth every morning with meal. 180 tablet 3     multivitamin, therapeutic (THERA-VIT) TABS tablet Take 1 tablet by mouth daily       Semaglutide, 1 MG/DOSE, (OZEMPIC) 4 MG/3ML pen Inject 1 mg Subcutaneous every 7 days 9 mL 1     blood glucose (NO BRAND SPECIFIED) test strip Use to test blood sugar 1 time daily or as directed. 100 strip 3     nitroGLYcerin (NITROSTAT) 0.4 MG sublingual tablet [NITROGLYCERIN (NITROSTAT) 0.4 MG SL TABLET] DISSOLVE ONE TABLET UNDER THE TONGUE EVERY 5 MINUTES AS NEEDED FOR CHEST PAIN.  DO NOT EXCEED A TOTAL OF 3 DOSES IN 15 MINUTES (Patient not taking: Reported on 2023) 25 tablet 2     Allergies   Allergen Reactions     Allopurinol Other (See Comments)     Other reaction(s): Chest Tightness, Gave pt pains in chest like a heart attack, Pain     Social History     Tobacco Use     Smoking status: Former     Packs/day: 1.50     Years: 15.00     Pack years: 22.50     Types: Cigarettes     Quit date: 1981     Years since quittin.4     Smokeless tobacco: Never   Vaping Use     Vaping status: Never Used     Passive vaping exposure: Yes   Substance Use Topics     Alcohol use: Yes     Alcohol/week: 14.0 standard drinks of alcohol             Grey Almanzar is a 82 year old, presenting for the following health issues:  Follow Up (DM, BP check)        2023    11:28 AM   Additional Questions   Roomed by Dunia SIMMONS   Accompanied by cecil         2023    11:28 AM   Patient Reported Additional Medications    Patient reports taking the following new medications no     HPI             Review of Systems         Objective    There were no vitals taken for this visit.  There is no height or weight on file to calculate BMI.  Physical Exam

## 2023-05-23 NOTE — PATIENT INSTRUCTIONS
Increase Ozempic to 1 mg weekly.  If any low blood sugars less than 80, stop the glimepiride.  Continue to eat small meals and healthy diabetic diet.  Contact me if you have significant worsening abdominal pain or nausea.    Proceed with sleep apnea evaluation next month as planned.    I will see you next month for your adult wellness visit physical exam, I will also be a follow-up on your diabetes.  You do not need to fast.    Make an appointment with your ophthalmologist for diabetic eye exam.

## 2023-06-05 ENCOUNTER — TRANSFERRED RECORDS (OUTPATIENT)
Dept: HEALTH INFORMATION MANAGEMENT | Facility: CLINIC | Age: 83
End: 2023-06-05
Payer: MEDICARE

## 2023-06-05 LAB — RETINOPATHY: NEGATIVE

## 2023-06-15 ENCOUNTER — OFFICE VISIT (OUTPATIENT)
Dept: INTERNAL MEDICINE | Facility: CLINIC | Age: 83
End: 2023-06-15
Payer: MEDICARE

## 2023-06-15 VITALS
RESPIRATION RATE: 20 BRPM | HEART RATE: 68 BPM | OXYGEN SATURATION: 97 % | SYSTOLIC BLOOD PRESSURE: 118 MMHG | WEIGHT: 229 LBS | TEMPERATURE: 98 F | DIASTOLIC BLOOD PRESSURE: 62 MMHG | HEIGHT: 71 IN | BODY MASS INDEX: 32.06 KG/M2

## 2023-06-15 DIAGNOSIS — I25.10 CORONARY ARTERY DISEASE DUE TO CALCIFIED CORONARY LESION: ICD-10-CM

## 2023-06-15 DIAGNOSIS — E11.9 TYPE 2 DIABETES MELLITUS WITHOUT COMPLICATION, WITHOUT LONG-TERM CURRENT USE OF INSULIN (H): ICD-10-CM

## 2023-06-15 DIAGNOSIS — Z00.00 ENCOUNTER FOR WELLNESS EXAMINATION IN ADULT: Primary | ICD-10-CM

## 2023-06-15 DIAGNOSIS — Z87.39 HISTORY OF GOUT: ICD-10-CM

## 2023-06-15 DIAGNOSIS — N18.30 CHRONIC RENAL INSUFFICIENCY, STAGE 3 (MODERATE) (H): ICD-10-CM

## 2023-06-15 DIAGNOSIS — I25.84 CORONARY ARTERY DISEASE DUE TO CALCIFIED CORONARY LESION: ICD-10-CM

## 2023-06-15 PROCEDURE — G0439 PPPS, SUBSEQ VISIT: HCPCS | Performed by: INTERNAL MEDICINE

## 2023-06-15 PROCEDURE — 99214 OFFICE O/P EST MOD 30 MIN: CPT | Mod: 25 | Performed by: INTERNAL MEDICINE

## 2023-06-15 RX ORDER — GLIMEPIRIDE 2 MG/1
2 TABLET ORAL
Qty: 90 TABLET | Refills: 3 | Status: SHIPPED | OUTPATIENT
Start: 2023-06-15

## 2023-06-15 ASSESSMENT — ENCOUNTER SYMPTOMS
NERVOUS/ANXIOUS: 0
HEARTBURN: 0
EYE PAIN: 0
FEVER: 0
HEADACHES: 0
WEAKNESS: 0
CHILLS: 0
NAUSEA: 0
DYSURIA: 0
PARESTHESIAS: 0
MYALGIAS: 0
FREQUENCY: 0
PALPITATIONS: 0
ARTHRALGIAS: 0
HEMATURIA: 0
SHORTNESS OF BREATH: 0
JOINT SWELLING: 0
CONSTIPATION: 1
HEMATOCHEZIA: 0
SORE THROAT: 0
ABDOMINAL PAIN: 0
COUGH: 0
DIZZINESS: 1

## 2023-06-15 ASSESSMENT — ACTIVITIES OF DAILY LIVING (ADL): CURRENT_FUNCTION: NO ASSISTANCE NEEDED

## 2023-06-15 NOTE — PATIENT INSTRUCTIONS
Reduce glimepiride down to 2 mg in the morning.  If you have any blood sugars less than 80, stop glimepiride entirely.  Continue on current Ozempic injections.  Continue with diabetic diet and regular walking exercise.    Drink more water, stay well-hydrated to reduce lightheadedness and reduce constipation.  Consider eating more vegetables or drinking V8 juice or using Metamucil powder fiber daily to improve constipation.    You are due for a tetanus shot which can be obtained at local pharmacy.    Follow-up in late August for a nonfasting diabetes follow-up appointment.

## 2023-06-15 NOTE — PROGRESS NOTES
SUBJECTIVE:   Yohan is a 82 year old who presents for Preventive Visit.    Lives independently with wife.  They are moving to [place] full-time this fall.    History of chronic alcohol use and sleep apnea suspect but had not had evaluation.  Patient stopped drinking alcohol this winter, significantly improved his diet.  Is starting to get regular exercise.    Significant improvement in mentation, overall strength and mobility.    History of hypertensive encephalopathy and lacunar stroke in 2014.  History of severe peripheral neuropathy.  History of autonomic neuropathy with labile hypertension in the past.    Since improving diet and stopping alcohol he has made significant improvements in stabilizing his blood pressure, no longer on blood pressure medication except for carvedilol 3.125 mg twice daily.    He does have furosemide 20 mg tablets as needed for edema or systolic blood pressure above 160 but he has not needed that.    His diabetes was out of control earlier this year with a hemoglobin A1c level of 8.9% in May.  His Ozempic was increased to 0.5 mg weekly to 1 mg weekly last month.  He tolerated that well without nausea and vomiting.    Still on Amaryl 4 mg in the morning and denies any low blood sugars.    His blood sugars have improved considerably and are now running 110-130.    He does have moderate chronic renal sufficiency with a creatinine 1.8 last month.  Normal liver test.    He has coronary disease with stable angina with walking.  But angina is stable only with significant walking and resolves immediately with stopping.  No history of MI.  On Lipitor 40 mg and LDL 64 last month.  No epigastric pain or bleeding on aspirin daily.    No palpitations or history of arrhythmia.  No syncope.  Does have a history of orthostatic hypotension symptoms but has not had significant lightheaded dizzy spells recently.    His blood pressures been ranging 110/60-1 33/60.    August 2022 carotid ultrasound 50-69%  "on the left and mild on the right.    Quit smoking in 1981.  No new cough or shortness of breath.    He did see ophthalmology earlier this month.  History of hypertensive arterial disease and history of a branch retinal vein occlusion, but did not have diabetic retinopathy.  He was given a 1 year follow-up plan.    No history of recurrent gout recently.  On Uloric.  Uric acid level was 5.6 last month.    Moderate BPH with 3 times a night nocturia is stable.  No dysuria.    Moderate constipation without significant abdominal pain.  No blood in stool.    No falls.      6/15/2023     8:47 AM   Additional Questions   Roomed by Kiara SIMMONS   Accompanied by wife         6/15/2023     8:47 AM   Patient Reported Additional Medications   Patient reports taking the following new medications no     Are you in the first 12 months of your Medicare coverage?  No    Healthy Habits:     In general, how would you rate your overall health?  Good    Frequency of exercise:  2-3 days/week    Duration of exercise:  45-60 minutes    Do you usually eat at least 4 servings of fruit and vegetables a day, include whole grains    & fiber and avoid regularly eating high fat or \"junk\" foods?  Yes    Ability to successfully perform activities of daily living:  No assistance needed    Home Safety:  No safety concerns identified    Hearing Impairment:  No hearing concerns    In the past 6 months, have you been bothered by leaking of urine?  No    In general, how would you rate your overall mental or emotional health?  Excellent      PHQ-2 Total Score: 0    Additional concerns today:  Yes        Have you ever done Advance Care Planning? (For example, a Health Directive, POLST, or a discussion with a medical provider or your loved ones about your wishes): Yes, advance care planning is on file.       Fall risk  Fallen 2 or more times in the past year?: No  Any fall with injury in the past year?: No  click delete button to remove this line now  Cognitive " Screening   1) Repeat 3 items (Leader, Season, Table)    2) Clock draw: NORMAL  3) 3 item recall: Recalls 2 objects   Results: NORMAL clock, 1-2 items recalled: COGNITIVE IMPAIRMENT LESS LIKELY    Mini-CogTM Copyright HERNESTO Crowe. Licensed by the author for use in [facility]; reprinted with permission (loree@[facility]). All rights reserved.      Do you have sleep apnea, excessive snoring or daytime drowsiness?: no    Reviewed and updated as needed this visit by clinical staff   Tobacco  Allergies  Meds              Reviewed and updated as needed this visit by Provider     Meds             Social History     Tobacco Use     Smoking status: Former     Packs/day: 1.50     Years: 15.00     Pack years: 22.50     Types: Cigarettes     Quit date: 1981     Years since quittin.4     Smokeless tobacco: Never   Vaping Use     Vaping status: Never Used     Passive vaping exposure: Yes   Substance Use Topics     Alcohol use: Yes     Alcohol/week: 14.0 standard drinks of alcohol             6/15/2023     8:41 AM   Alcohol Use   Prescreen: >3 drinks/day or >7 drinks/week? No         2022     7:55 AM   AUDIT - Alcohol Use Disorders Identification Test - Reproduced from the World Health Organization Audit 2001 (Second Edition)   1.  How often do you have a drink containing alcohol? 4 or more times a week   2.  How many drinks containing alcohol do you have on a typical day when you are drinking? 1 or 2   3.  How often do you have five or more drinks on one occasion? Never   9.  Have you or someone else been injured because of your drinking? No   10. Has a relative, friend, doctor or other health care worker been concerned about your drinking or suggested you cut down? Yes, during the last year     Do you have a current opioid prescription? No  Do you use any other controlled substances or medications that are not prescribed by a provider? None            Current providers sharing in care for this patient  include:   Patient Care Team:  Dimitri Ma MD as PCP - General (Internal Medicine)  Monique Stacy, PharmD as Pharmacist (Pharmacist)  Dimitri Ma MD as Assigned PCP  Jaylen Purdy MD as Assigned Heart and Vascular Provider  Kat Stapleton MD as Resident (Student in organized health care education/training program)  Dimitri Ma MD as Referring Physician (Internal Medicine)    The following health maintenance items are reviewed in Epic and correct as of today:  Health Maintenance   Topic Date Due     MICROALBUMIN  Never done     DIABETIC FOOT EXAM  Never done     ZOSTER IMMUNIZATION (1 of 2) Never done     COVID-19 Vaccine (3 - Pfizer series) 04/20/2021     DTAP/TDAP/TD IMMUNIZATION (2 - Td or Tdap) 10/31/2022     LIPID  06/08/2023     ANNUAL REVIEW OF HM ORDERS  06/08/2023     MEDICARE ANNUAL WELLNESS VISIT  06/08/2023     HEMOGLOBIN  09/16/2023     A1C  11/05/2023     BMP  05/05/2024     EYE EXAM  06/05/2024     FALL RISK ASSESSMENT  06/15/2024     ADVANCE CARE PLANNING  06/15/2028     PHQ-2 (once per calendar year)  Completed     INFLUENZA VACCINE  Completed     Pneumococcal Vaccine: 65+ Years  Completed     URINALYSIS  Completed     IPV IMMUNIZATION  Aged Out     MENINGITIS IMMUNIZATION  Aged Out     Labs [facility] in [facility]  Current Outpatient Medications   Medication Sig Dispense Refill     aspirin 325 MG tablet Take 325 mg by mouth daily       atorvastatin (LIPITOR) 40 MG tablet [ATORVASTATIN (LIPITOR) 40 MG TABLET] TAKE 1 TABLET BY MOUTH AT BEDTIME 90 tablet 3     carvedilol (COREG) 3.125 MG tablet Take 1 tablet by mouth 2 times daily       febuxostat (ULORIC) 40 MG TABS tablet Take 1 tablet (40 mg) by mouth daily (Patient taking differently: Take 40 mg by mouth every other day) 90 tablet 3     glimepiride (AMARYL) 2 MG tablet Take 1 tablet (2 mg) by mouth every morning (before breakfast) 90 tablet 3     multivitamin, therapeutic (THERA-VIT) TABS tablet Take 1 tablet by mouth daily        "Semaglutide, 1 MG/DOSE, (OZEMPIC) 4 MG/3ML pen Inject 1 mg Subcutaneous every 7 days 9 mL 1     blood glucose (NO BRAND SPECIFIED) test strip Use to test blood sugar 1 time daily or as directed. 100 strip 3     furosemide (LASIX) 20 MG tablet Take 1 pill once that day if your blood pressure is above 160 systolic. (Patient not taking: Reported on 6/15/2023) 30 tablet 0     nitroGLYcerin (NITROSTAT) 0.4 MG sublingual tablet [NITROGLYCERIN (NITROSTAT) 0.4 MG SL TABLET] DISSOLVE ONE TABLET UNDER THE TONGUE EVERY 5 MINUTES AS NEEDED FOR CHEST PAIN.  DO NOT EXCEED A TOTAL OF 3 DOSES IN 15 MINUTES (Patient not taking: Reported on 5/23/2023) 25 tablet 2     Allergies   Allergen Reactions     Allopurinol Other (See Comments)     Other reaction(s): Chest Tightness, Gave pt pains in chest like a heart attack, Pain         Review of Systems   Constitutional: Negative for chills and fever.   HENT: Negative for congestion, ear pain, hearing loss and sore throat.    Eyes: Negative for pain and visual disturbance.   Respiratory: Negative for cough and shortness of breath.    Cardiovascular: Negative for chest pain, palpitations and peripheral edema.   Gastrointestinal: Positive for constipation. Negative for abdominal pain, heartburn, hematochezia and nausea.   Genitourinary: Positive for impotence. Negative for dysuria, frequency, genital sores, hematuria and urgency.   Musculoskeletal: Negative for arthralgias, joint swelling and myalgias.   Skin: Negative for rash.   Neurological: Positive for dizziness. Negative for weakness, headaches and paresthesias.   Psychiatric/Behavioral: Negative for mood changes. The patient is not nervous/anxious.      Constitutional, HEENT, cardiovascular, pulmonary, GI, , musculoskeletal, neuro, skin, endocrine and psych systems are negative, except as otherwise noted.    OBJECTIVE:   /62   Pulse 68   Temp 98  F (36.7  C)   Resp 20   Ht 1.791 m (5' 10.5\")   Wt 103.9 kg (229 lb)   SpO2 " "97%   BMI 32.39 kg/m   Estimated body mass index is 32.39 kg/m  as calculated from the following:    Height as of this encounter: 1.791 m (5' 10.5\").    Weight as of this encounter: 103.9 kg (229 lb).  Physical Exam  Patient is bright and alert x3.  Significant improved mentation and mobility since last year.    Clock face drawing and word recall good.  Able to climb up on exam table with mobility within normal limits.  He is not parkinsonian.    Pupils and irises equal and reactive.  Extraocular muscles intact.  No jaundice or conjunctivitis.  Wears hearing aids.  No significant cerumen.  Tympanic membranes are normal.    No pharyngitis.  Teeth in good condition.  No cervical or supraclavicular or axillary adenopathy.  No JVD and no carotid bruits.  No thyromegaly or nodularity.    Lungs are clear to auscultation with good respiratory excursion.  Heart is regular without murmur rub or gallop.  +1 pedal pulses.  No significant ankle edema.  Feet in good condition without preulcerative calluses or evidence of rubbing.  Thickened toenails with fungus.  Significant hammertoes but without callus.  Decreased sensation.    Abdomen is mildly overweight and nontender.  No hepatosplenomegaly and liver edge is normal.  No pulsatile abdominal mass.    Skin exam without suspicious skin lesions.    ASSESSMENT / PLAN:   (Z00.00) Encounter for wellness examination in adult  (primary encounter diagnosis)  Comment: Patient's main health maintenance issue is his history of severe neuropathy with chronic alcohol and sleep apnea history.    He is made dramatic improvement in lifestyle with weight loss, stopping alcohol and improving exercise and diet.  His neuropathy symptoms have improved but still present.  Encephalopathy symptoms have largely resolved.    I congratulated him on stopping alcohol and improving diet, continue with current plan.  Plan: Patient is full code.    Eye exam done earlier this month without retinopathy    He " "has refused COVID immunizations.  He was reminded he is due for a tetanus shot.    No further prostate or colon cancer surgery with age.    (E11.9) Type 2 diabetes mellitus without complication, without long-term current use of insulin (H)  Comment: Significant improved control.  Improved diet and exercise.  As tolerated increase of Ozempic to 1 mg.    He was warned about low blood sugar risk of Amaryl.  Reduce to 2 mg instead of 4 mg.  Monitor for low blood sugars and stop glimepiride if any low blood sugars less than 80.  Plan: glimepiride (AMARYL) 2 MG tablet        Follow-up in August for hemoglobin A1c check    (I25.10,  I25.84) Coronary artery disease due to calcified coronary lesion  Comment: Stable angina, class I-2.  Plan: Continue Lipitor 40 mg and aspirin daily    Carotid artery stenosis, medication management as above    (N18.30) Chronic renal insufficiency, stage 3 (moderate) (H)  Comment: Has stabilized.  Recheck labs in August edema not an issue with improved  Plan: Sleep apnea symptoms.  Can use furosemide as needed for systolic blood pressure above 160 or edema    Continue current carvedilol 3.125 mg twice daily    (Z87.39) History of gout  Comment: No uric acid level on Uloric  Plan: No change in treatment plan    Presumed sleep apnea, but significant improved symptoms since stopping alcohol and losing weight.  He canceled his sleep clinic appointment that is scheduled for this month.  He does not believe he would tolerate CPAP in any case and does not wish to proceed with sleep apnea evaluation    Patient has been advised of split billing requirements and indicates understanding: Yes      COUNSELING:  Reviewed preventive health counseling, as reflected in patient instructions       Regular exercise       Healthy diet/nutrition      BMI:   Estimated body mass index is 32.39 kg/m  as calculated from the following:    Height as of this encounter: 1.791 m (5' 10.5\").    Weight as of this encounter: " 103.9 kg (229 lb).   Weight management plan: Discussed healthy diet and exercise guidelines      He reports that he quit smoking about 42 years ago. His smoking use included cigarettes. He has a 22.50 pack-year smoking history. He has never used smokeless tobacco.      Appropriate preventive services were discussed with this patient, including applicable screening as appropriate for cardiovascular disease, diabetes, osteopenia/osteoporosis, and glaucoma.  As appropriate for age/gender, discussed screening for colorectal cancer, prostate cancer, breast cancer, and cervical cancer. Checklist reviewing preventive services available has been given to the patient.    Reviewed patients plan of care and provided an AVS. The Basic Care Plan (routine screening as documented in Health Maintenance) for Yohan meets the Care Plan requirement. This Care Plan has been established and reviewed with the Patient.          Dimitri Ma MD  [facility]    Identified Health Risks:    I have reviewed Opioid Use Disorder and Substance Use Disorder risk factors and made any needed referrals.

## 2023-07-07 ENCOUNTER — TRANSFERRED RECORDS (OUTPATIENT)
Dept: HEALTH INFORMATION MANAGEMENT | Facility: CLINIC | Age: 83
End: 2023-07-07
Payer: MEDICARE

## 2023-07-07 LAB — RETINOPATHY: NEGATIVE

## 2023-08-28 ENCOUNTER — OFFICE VISIT (OUTPATIENT)
Dept: INTERNAL MEDICINE | Facility: CLINIC | Age: 83
End: 2023-08-28
Payer: MEDICARE

## 2023-08-28 VITALS
OXYGEN SATURATION: 95 % | RESPIRATION RATE: 20 BRPM | HEIGHT: 71 IN | DIASTOLIC BLOOD PRESSURE: 70 MMHG | WEIGHT: 230 LBS | BODY MASS INDEX: 32.2 KG/M2 | TEMPERATURE: 98 F | HEART RATE: 65 BPM | SYSTOLIC BLOOD PRESSURE: 134 MMHG

## 2023-08-28 DIAGNOSIS — I25.84 CORONARY ARTERY DISEASE DUE TO CALCIFIED CORONARY LESION: ICD-10-CM

## 2023-08-28 DIAGNOSIS — N18.30 CHRONIC RENAL INSUFFICIENCY, STAGE 3 (MODERATE) (H): ICD-10-CM

## 2023-08-28 DIAGNOSIS — E11.69 TYPE 2 DIABETES MELLITUS WITH OTHER SPECIFIED COMPLICATION, WITHOUT LONG-TERM CURRENT USE OF INSULIN (H): Primary | ICD-10-CM

## 2023-08-28 DIAGNOSIS — Z51.81 ENCOUNTER FOR THERAPEUTIC DRUG MONITORING: ICD-10-CM

## 2023-08-28 DIAGNOSIS — I25.10 CORONARY ARTERY DISEASE DUE TO CALCIFIED CORONARY LESION: ICD-10-CM

## 2023-08-28 LAB
ALBUMIN SERPL BCG-MCNC: 3.9 G/DL (ref 3.5–5.2)
ALP SERPL-CCNC: 67 U/L (ref 40–129)
ALT SERPL W P-5'-P-CCNC: 19 U/L (ref 0–70)
ANION GAP SERPL CALCULATED.3IONS-SCNC: 13 MMOL/L (ref 7–15)
AST SERPL W P-5'-P-CCNC: 26 U/L (ref 0–45)
BILIRUB SERPL-MCNC: 0.3 MG/DL
BUN SERPL-MCNC: 40.2 MG/DL (ref 8–23)
CALCIUM SERPL-MCNC: 9.5 MG/DL (ref 8.8–10.2)
CHLORIDE SERPL-SCNC: 104 MMOL/L (ref 98–107)
CHOLEST SERPL-MCNC: 142 MG/DL
CREAT SERPL-MCNC: 1.82 MG/DL (ref 0.67–1.17)
DEPRECATED HCO3 PLAS-SCNC: 23 MMOL/L (ref 22–29)
GFR SERPL CREATININE-BSD FRML MDRD: 37 ML/MIN/1.73M2
GLUCOSE SERPL-MCNC: 197 MG/DL (ref 70–99)
HBA1C MFR BLD: 7.2 % (ref 0–5.6)
HDLC SERPL-MCNC: 34 MG/DL
LDLC SERPL CALC-MCNC: 73 MG/DL
NONHDLC SERPL-MCNC: 108 MG/DL
POTASSIUM SERPL-SCNC: 4.6 MMOL/L (ref 3.4–5.3)
PROT SERPL-MCNC: 7 G/DL (ref 6.4–8.3)
SODIUM SERPL-SCNC: 140 MMOL/L (ref 136–145)
TRIGL SERPL-MCNC: 176 MG/DL

## 2023-08-28 PROCEDURE — 83036 HEMOGLOBIN GLYCOSYLATED A1C: CPT | Performed by: INTERNAL MEDICINE

## 2023-08-28 PROCEDURE — 36415 COLL VENOUS BLD VENIPUNCTURE: CPT | Performed by: INTERNAL MEDICINE

## 2023-08-28 PROCEDURE — 80061 LIPID PANEL: CPT | Performed by: INTERNAL MEDICINE

## 2023-08-28 PROCEDURE — 80053 COMPREHEN METABOLIC PANEL: CPT | Performed by: INTERNAL MEDICINE

## 2023-08-28 PROCEDURE — 99214 OFFICE O/P EST MOD 30 MIN: CPT | Performed by: INTERNAL MEDICINE

## 2023-08-28 NOTE — PROGRESS NOTES
Yohan Pagensen   82 year old male    Date of Visit: 8/28/2023    Chief Complaint   Patient presents with    Diabetes     fasting     Subjective  82-year-old male here with wife.  Here for follow-up on diabetes and his heart condition before moving to [place] permanently.    Obesity with suspected sleep apnea and history of alcohol, which she stopped drinking on a regular basis last year.    He has significant peripheral neuropathy.  History of hypertensive encephalopathy and a lacunar stroke in 2014.  No recent confusional episodes or neurologic changes.  He continues to abstain from alcohol except for occasional drinks with social engagements.    He has some element of autonomic neuropathy and highly fluctuating blood pressure in the past, but since he stopped drinking that has largely resolved.  Blood pressure has been stable.  He did not tolerate coming off the carvedilol and remains on 3.125 mg twice daily carvedilol.  He was having low blood pressures on other medications.    He has not needed the Lasix 20 mg as needed for systolic blood pressure above 160 or leg edema.    He does have moderate chronic renal insufficiency with a creatinine 1.8-2.0 which has been stable.    Coronary disease with history of stable angina but has not had any recent angina.  Doing some walking.  Uses a walking stick for walking because of the peripheral neuropathy and balance issues but has not had recent falls.    No chest pain or chest pressure or increasing shortness of breath with exercise.    August 2022 carotid ultrasound 50-69% in the left and mild on the right.    He quit smoking in 1981.    Diabetes type 2 was poorly controlled in May with a hemoglobin A1c level of 8.9%.  Ozempic was increased in June to 1 mg and he is tolerated that well.  No abdominal pain or nausea or vomiting.    He still on 4 mg of glimepiride.  He did not reduce that as instructed.  But he has not had any low blood sugars.  He still not  "great with diet and intermittent blood sugars up in the 180s when he is not eating well.  When he is following his diet his blood sugars are generally 1 .  He denies any low blood sugars.    No foot sores.    PMHx:  No past medical history on file.  PSHx:    Past Surgical History:   Procedure Laterality Date    ESOPHAGOSCOPY, GASTROSCOPY, DUODENOSCOPY (EGD), COMBINED  05/05/2011     Immunizations:   Immunization History   Administered Date(s) Administered    COVID-19 MONOVALENT 12+ (Pfizer) 01/30/2021, 02/23/2021    FLU 6-35 months 10/10/2009    Flu 65+ Years 10/13/2017    Influenza (High Dose) 3 valent vaccine 09/14/2015, 10/27/2016, 09/04/2018, 10/08/2019    Influenza (IIV3) PF 11/10/2011    Influenza Vaccine 65+ (Fluzone HD) 10/08/2020, 09/08/2022    Influenza, Whole Virus 10/16/2013    Pneumo Conj 13-V (2010&after) 07/23/2015    Pneumococcal 23 valent 10/31/2012    TDAP (Adacel,Boostrix) 10/31/2012       ROS A comprehensive review of systems was performed and was otherwise negative    Medications, allergies, and problem list were reviewed and updated    Exam  /70   Pulse 65   Temp 98  F (36.7  C)   Resp 20   Ht 1.791 m (5' 10.5\")   Wt 104.3 kg (230 lb)   SpO2 95%   BMI 32.54 kg/m    Alert and oriented x3.  No jaundice.  Lungs are clear.  Heart is regular without murmur.  Abdomen is nontender and there is no edema.    Assessment/Plan  1. Type 2 diabetes mellitus with other specified complication, without long-term current use of insulin (H)  Improving control with higher dose of Ozempic.  He is also stopped alcohol, improve exercise.  Could improve diet further.    He was warned about low blood sugar risk with glimepiride.  See patient instructions which I did review with patient.    Continue current medications at this time.    He will be moving to [place] permanently and will establish care with Rosemarie Del Toro Within 3 to 4 months.    I did give him a 1 year refill on the Ozempic today.  - " Hemoglobin A1c  - Semaglutide, 1 MG/DOSE, (OZEMPIC) 4 MG/3ML pen; Inject 1 mg Subcutaneous every 7 days  Dispense: 9 mL; Refill: 3    2. Chronic renal insufficiency, stage 3 (moderate) (H)  Stable, checking labs today    3. Coronary artery disease due to calcified coronary lesion  Asymptomatic at this time.  Has had some stable angina previously.  Continue aspirin and atorvastatin 40 mg.  - Lipid Profile    4. Encounter for therapeutic drug monitoring      I discussed COVID-vaccine and flu shot.  He does plan to get a flu shot this fall.  But he does not wish to get any further COVID vaccines.  - Comprehensive metabolic panel    He is due for a tetanus shot and can get that at local pharmacy.    No recurrent gout on Uloric    Peripheral neuropathy associated with history of sleep apnea, diabetes and alcohol.  I did  patient to avoid alcohol at all times.  He does not wish to have evaluation for sleep apnea.      Return in about 3 months (around 11/28/2023) for Diabetes follow-up appointment in [place].   Patient Instructions   No change in medications at this time.    If any low blood sugars less than 80, reduce loperamide down to 2 mg a day, or even stop glimepiride.    Get a flu shot this fall at local pharmacy.  You are due for a tetanus booster, which can be obtained at local pharmacy.    Follow-up with your new provider in [place] in 3 to 4 months.    Dimitri Ma MD, MD        Current Outpatient Medications   Medication Sig Dispense Refill    aspirin 325 MG tablet Take 325 mg by mouth daily      atorvastatin (LIPITOR) 40 MG tablet [ATORVASTATIN (LIPITOR) 40 MG TABLET] TAKE 1 TABLET BY MOUTH AT BEDTIME 90 tablet 3    carvedilol (COREG) 3.125 MG tablet Take 1 tablet by mouth 2 times daily      febuxostat (ULORIC) 40 MG TABS tablet Take 1 tablet (40 mg) by mouth daily (Patient taking differently: Take 40 mg by mouth every other day) 90 tablet 3    glimepiride (AMARYL) 2 MG tablet Take 1 tablet (2 mg) by  "mouth every morning (before breakfast) 90 tablet 3    multivitamin, therapeutic (THERA-VIT) TABS tablet Take 1 tablet by mouth daily      Semaglutide, 1 MG/DOSE, (OZEMPIC) 4 MG/3ML pen Inject 1 mg Subcutaneous every 7 days 9 mL 3    blood glucose (NO BRAND SPECIFIED) test strip Use to test blood sugar 1 time daily or as directed. 100 strip 3    furosemide (LASIX) 20 MG tablet Take 1 pill once that day if your blood pressure is above 160 systolic. (Patient not taking: Reported on 6/15/2023) 30 tablet 0    nitroGLYcerin (NITROSTAT) 0.4 MG sublingual tablet [NITROGLYCERIN (NITROSTAT) 0.4 MG SL TABLET] DISSOLVE ONE TABLET UNDER THE TONGUE EVERY 5 MINUTES AS NEEDED FOR CHEST PAIN.  DO NOT EXCEED A TOTAL OF 3 DOSES IN 15 MINUTES (Patient not taking: Reported on 2023) 25 tablet 2     Allergies   Allergen Reactions    Allopurinol Other (See Comments)     Other reaction(s): Chest Tightness, Gave pt pains in chest like a heart attack, Pain     Social History     Tobacco Use    Smoking status: Former     Packs/day: 1.50     Years: 15.00     Pack years: 22.50     Types: Cigarettes     Quit date: 1981     Years since quittin.6    Smokeless tobacco: Never   Vaping Use    Vaping Use: Never used   Substance Use Topics    Alcohol use: Yes     Alcohol/week: 14.0 standard drinks of alcohol             Grey Almanzar is a 82 year old, presenting for the following health issues:  Diabetes (fasting)        2023     9:47 AM   Additional Questions   Roomed by Kiara SIMMONS   Accompanied by wife         2023     9:47 AM   Patient Reported Additional Medications   Patient reports taking the following new medications no       HPI             Review of Systems         Objective    /70   Pulse 65   Temp 98  F (36.7  C)   Resp 20   Ht 1.791 m (5' 10.5\")   Wt 104.3 kg (230 lb)   SpO2 95%   BMI 32.54 kg/m    Body mass index is 32.54 kg/m .  Physical Exam                         "

## 2023-08-28 NOTE — PATIENT INSTRUCTIONS
No change in medications at this time.    If any low blood sugars less than 80, reduce loperamide down to 2 mg a day, or even stop glimepiride.    Get a flu shot this fall at local pharmacy.  You are due for a tetanus booster, which can be obtained at local pharmacy.    Follow-up with your new provider in [place] in 3 to 4 months.

## 2023-11-27 DIAGNOSIS — I25.10 CORONARY ARTERY DISEASE DUE TO CALCIFIED CORONARY LESION: ICD-10-CM

## 2023-11-27 DIAGNOSIS — I25.84 CORONARY ARTERY DISEASE DUE TO CALCIFIED CORONARY LESION: ICD-10-CM

## 2023-11-27 RX ORDER — NITROGLYCERIN 0.4 MG/1
TABLET SUBLINGUAL
Qty: 25 TABLET | Refills: 2 | Status: SHIPPED | OUTPATIENT
Start: 2023-11-27

## 2024-01-03 DIAGNOSIS — Z87.39 HISTORY OF GOUT: ICD-10-CM

## 2024-01-04 RX ORDER — FEBUXOSTAT 40 MG/1
40 TABLET, FILM COATED ORAL DAILY
Qty: 90 TABLET | Refills: 3 | Status: SHIPPED | OUTPATIENT
Start: 2024-01-04

## 2024-01-30 NOTE — TELEPHONE ENCOUNTER
Pt called to state that fax from [facility] is faxing a form to be completed by Dr. Ma in order to get Uloric Rx filled at pharmacy.

## 2024-02-06 ENCOUNTER — TELEPHONE (OUTPATIENT)
Dept: INTERNAL MEDICINE | Facility: CLINIC | Age: 84
End: 2024-02-06
Payer: MEDICARE

## 2024-02-06 NOTE — TELEPHONE ENCOUNTER
"General Call    Contacts         Type Contact Phone/Fax    02/06/2024 10:31 AM CST Phone (Incoming) Yohan Hayward (Self) [phone] (H)          Reason for Call:  Patient called and wanted to inquire as to whether or not Dr. Ma has responded to this request by his insurance company:        It's in the \"media\" portion of the patients chart and it's regarding coverage for a medication that the patient is needing to have filled.    Could we send this information to you in [employer] or would you prefer to receive a phone call?:     Patient would prefer a phone call     Okay to leave a detailed message?: Yes at Cell number on file:    Telephone Information:   Mobile [phone]     "

## 2024-04-18 DIAGNOSIS — E11.9 TYPE 2 DIABETES MELLITUS WITHOUT COMPLICATION, WITHOUT LONG-TERM CURRENT USE OF INSULIN (H): ICD-10-CM

## 2024-04-18 NOTE — TELEPHONE ENCOUNTER
Patient is asking for a refill request. He now lives in [place] but has not found a PCP there yet. Asking if Dr Ma can bridge him.

## 2024-04-19 RX ORDER — GLIMEPIRIDE 2 MG/1
2 TABLET ORAL
Qty: 90 TABLET | Refills: 3 | OUTPATIENT
Start: 2024-04-19

## 2024-05-05 ENCOUNTER — HEALTH MAINTENANCE LETTER (OUTPATIENT)
Age: 84
End: 2024-05-05

## 2024-05-23 ENCOUNTER — PATIENT OUTREACH (OUTPATIENT)
Dept: CARE COORDINATION | Facility: CLINIC | Age: 84
End: 2024-05-23
Payer: MEDICARE

## 2024-06-06 ENCOUNTER — PATIENT OUTREACH (OUTPATIENT)
Dept: CARE COORDINATION | Facility: CLINIC | Age: 84
End: 2024-06-06
Payer: MEDICARE

## 2024-07-18 ENCOUNTER — PATIENT OUTREACH (OUTPATIENT)
Dept: INTERNAL MEDICINE | Facility: CLINIC | Age: 84
End: 2024-07-18
Payer: COMMERCIAL

## 2024-07-18 NOTE — LETTER
July 18, 2024      Yohan Hayward  [address]  [place] [address]      Your healthcare team cares about your health. To provide you with the best care, we have reviewed your chart and based on our findings, we see that you are due to:     PREVENTATIVE VISIT: Annual Medicare Wellness:Schedule an Annual Medicare Wellness Exam. Please call your [facility] [place] [facility] to set up your appointment.    If you have already completed these items, please contact the clinic via phone or Mychart so your care team can review and update your records.  Thank you for choosing [facility] for your healthcare needs. For any questions, concerns, or to schedule an appointment please contact the clinic.     Healthy Regards,    Your [facility] Care Team

## 2024-07-18 NOTE — TELEPHONE ENCOUNTER
Patient Quality Outreach    Patient is due for the following:   Physical Annual Wellness Visit    Next Steps:   Schedule a Annual Wellness Visit    Type of outreach:    Sent RADSONE message.      Questions for provider review:    None           Carla Marley MA

## 2024-12-01 ENCOUNTER — HEALTH MAINTENANCE LETTER (OUTPATIENT)
Age: 84
End: 2024-12-01

## 2025-01-26 DIAGNOSIS — Z87.39 HISTORY OF GOUT: ICD-10-CM

## 2025-01-27 RX ORDER — FEBUXOSTAT 40 MG/1
40 TABLET, FILM COATED ORAL DAILY
Qty: 90 TABLET | Refills: 3 | Status: SHIPPED | OUTPATIENT
Start: 2025-01-27

## 2025-05-19 ENCOUNTER — PATIENT OUTREACH (OUTPATIENT)
Dept: CARE COORDINATION | Facility: CLINIC | Age: 85
End: 2025-05-19
Payer: MEDICARE

## 2025-06-07 ENCOUNTER — HEALTH MAINTENANCE LETTER (OUTPATIENT)
Age: 85
End: 2025-06-07

## 2025-07-19 ENCOUNTER — HEALTH MAINTENANCE LETTER (OUTPATIENT)
Age: 85
End: 2025-07-19